# Patient Record
Sex: FEMALE | Race: WHITE | NOT HISPANIC OR LATINO | Employment: UNEMPLOYED | URBAN - METROPOLITAN AREA
[De-identification: names, ages, dates, MRNs, and addresses within clinical notes are randomized per-mention and may not be internally consistent; named-entity substitution may affect disease eponyms.]

---

## 2016-11-29 LAB — CRC RECOMMENDATION EXT: NORMAL

## 2019-07-16 ENCOUNTER — TELEPHONE (OUTPATIENT)
Dept: TRANSPLANT | Facility: CLINIC | Age: 54
End: 2019-07-16

## 2019-07-16 NOTE — TELEPHONE ENCOUNTER
----- Message from Trupti Schroeder sent at 7/16/2019  8:43 AM CDT -----    Rec'tangela recs on pt, but missing the 1st 30 pg. Called Ref md office, Silviano Clark, at [phone] and they will re-send pt ref to [phone]

## 2019-07-29 ENCOUNTER — TELEPHONE (OUTPATIENT)
Dept: TRANSPLANT | Facility: CLINIC | Age: 54
End: 2019-07-29

## 2019-07-29 DIAGNOSIS — K70.31 ALCOHOLIC CIRRHOSIS OF LIVER WITH ASCITES: ICD-10-CM

## 2019-07-29 PROBLEM — K70.30 ALCOHOLIC CIRRHOSIS OF LIVER: Status: ACTIVE | Noted: 2019-07-29

## 2019-07-29 NOTE — TELEPHONE ENCOUNTER
Referral received from Dr Silviano Clark    Patient with cirrhosis  Secondary to alcohol use. . MELD 13-18 depending on labs.   ICD-10:  K70.31  Referred for liver transplant for CONSULT     Referral completed and forwarded to [facility].          Insurance: [facility]  PRIMARY: [facility]   ID: [id number]  Contact # [phone]    SECONDARY:   ID:  Contact #

## 2019-08-06 ENCOUNTER — TELEPHONE (OUTPATIENT)
Dept: TRANSPLANT | Facility: CLINIC | Age: 54
End: 2019-08-06

## 2019-08-06 RX ORDER — SPIRONOLACTONE 100 MG/1
300 TABLET, FILM COATED ORAL
COMMUNITY
Start: 2018-10-26 | End: 2019-10-24 | Stop reason: SDUPTHER

## 2019-08-06 RX ORDER — CIPROFLOXACIN 500 MG/1
TABLET ORAL
Refills: 0 | COMMUNITY
Start: 2019-07-09 | End: 2019-08-21 | Stop reason: ALTCHOICE

## 2019-08-06 RX ORDER — DULOXETIN HYDROCHLORIDE 60 MG/1
60 CAPSULE, DELAYED RELEASE ORAL DAILY
COMMUNITY
Start: 2016-11-02 | End: 2022-07-19 | Stop reason: SDUPTHER

## 2019-08-06 NOTE — TELEPHONE ENCOUNTER
----- Message from Trupti Schroeder sent at 8/6/2019 12:22 PM CDT -----    Called and sp to pt about aurelia an appt. Offered her appts for randall week, but she wanted an PM appt; appt aurelia'ed for 8/21

## 2019-08-07 DIAGNOSIS — K70.30 ALCOHOLIC CIRRHOSIS, UNSPECIFIED WHETHER ASCITES PRESENT: Primary | ICD-10-CM

## 2019-08-07 DIAGNOSIS — Z76.82 ORGAN TRANSPLANT CANDIDATE: ICD-10-CM

## 2019-08-21 ENCOUNTER — LAB VISIT (OUTPATIENT)
Dept: LAB | Facility: HOSPITAL | Age: 54
End: 2019-08-21
Payer: COMMERCIAL

## 2019-08-21 ENCOUNTER — OFFICE VISIT (OUTPATIENT)
Dept: TRANSPLANT | Facility: CLINIC | Age: 54
End: 2019-08-21
Payer: COMMERCIAL

## 2019-08-21 VITALS
TEMPERATURE: 99 F | HEIGHT: 64 IN | OXYGEN SATURATION: 100 % | BODY MASS INDEX: 30.63 KG/M2 | HEART RATE: 98 BPM | DIASTOLIC BLOOD PRESSURE: 76 MMHG | RESPIRATION RATE: 17 BRPM | SYSTOLIC BLOOD PRESSURE: 140 MMHG | WEIGHT: 179.44 LBS

## 2019-08-21 DIAGNOSIS — K70.30 ALCOHOLIC CIRRHOSIS, UNSPECIFIED WHETHER ASCITES PRESENT: ICD-10-CM

## 2019-08-21 DIAGNOSIS — F41.9 ANXIETY DISORDER, UNSPECIFIED TYPE: ICD-10-CM

## 2019-08-21 DIAGNOSIS — Z76.82 ORGAN TRANSPLANT CANDIDATE: ICD-10-CM

## 2019-08-21 DIAGNOSIS — F32.A DEPRESSION, UNSPECIFIED DEPRESSION TYPE: ICD-10-CM

## 2019-08-21 DIAGNOSIS — Z87.2 HISTORY OF CELLULITIS: ICD-10-CM

## 2019-08-21 DIAGNOSIS — K76.6 PORTAL HYPERTENSIVE GASTROPATHY: ICD-10-CM

## 2019-08-21 DIAGNOSIS — K31.89 PORTAL HYPERTENSIVE GASTROPATHY: ICD-10-CM

## 2019-08-21 DIAGNOSIS — R18.8 OTHER ASCITES: ICD-10-CM

## 2019-08-21 DIAGNOSIS — B18.2 CHRONIC HEPATITIS C WITHOUT HEPATIC COMA: ICD-10-CM

## 2019-08-21 DIAGNOSIS — E87.1 HYPONATREMIA: ICD-10-CM

## 2019-08-21 PROBLEM — E11.9 TYPE 2 DIABETES MELLITUS: Status: ACTIVE | Noted: 2019-08-21

## 2019-08-21 LAB
ABO + RH BLD: NORMAL
AFP SERPL-MCNC: 6.1 NG/ML (ref 0–8.4)
ALBUMIN SERPL BCP-MCNC: 3.1 G/DL (ref 3.5–5.2)
ALP SERPL-CCNC: 197 U/L (ref 55–135)
ALT SERPL W/O P-5'-P-CCNC: 18 U/L (ref 10–44)
ANION GAP SERPL CALC-SCNC: 9 MMOL/L (ref 8–16)
AST SERPL-CCNC: 33 U/L (ref 10–40)
BASOPHILS # BLD AUTO: 0.05 K/UL (ref 0–0.2)
BASOPHILS NFR BLD: 0.8 % (ref 0–1.9)
BILIRUB DIRECT SERPL-MCNC: 1.1 MG/DL (ref 0.1–0.3)
BILIRUB SERPL-MCNC: 1.7 MG/DL (ref 0.1–1)
BLD GP AB SCN CELLS X3 SERPL QL: NORMAL
BUN SERPL-MCNC: 15 MG/DL (ref 6–20)
CALCIUM SERPL-MCNC: 9.9 MG/DL (ref 8.7–10.5)
CHLORIDE SERPL-SCNC: 97 MMOL/L (ref 95–110)
CO2 SERPL-SCNC: 23 MMOL/L (ref 23–29)
CREAT SERPL-MCNC: 0.8 MG/DL (ref 0.5–1.4)
DIFFERENTIAL METHOD: ABNORMAL
EOSINOPHIL # BLD AUTO: 0.3 K/UL (ref 0–0.5)
EOSINOPHIL NFR BLD: 5.1 % (ref 0–8)
ERYTHROCYTE [DISTWIDTH] IN BLOOD BY AUTOMATED COUNT: 15 % (ref 11.5–14.5)
EST. GFR  (AFRICAN AMERICAN): >60 ML/MIN/1.73 M^2
EST. GFR  (NON AFRICAN AMERICAN): >60 ML/MIN/1.73 M^2
GGT SERPL-CCNC: 125 U/L (ref 8–55)
GLUCOSE SERPL-MCNC: 91 MG/DL (ref 70–110)
HCT VFR BLD AUTO: 36.5 % (ref 37–48.5)
HGB BLD-MCNC: 11.8 G/DL (ref 12–16)
IMM GRANULOCYTES # BLD AUTO: 0.04 K/UL (ref 0–0.04)
IMM GRANULOCYTES NFR BLD AUTO: 0.6 % (ref 0–0.5)
INR PPP: 1.2 (ref 0.8–1.2)
LYMPHOCYTES # BLD AUTO: 1.7 K/UL (ref 1–4.8)
LYMPHOCYTES NFR BLD: 25.4 % (ref 18–48)
MCH RBC QN AUTO: 33.3 PG (ref 27–31)
MCHC RBC AUTO-ENTMCNC: 32.3 G/DL (ref 32–36)
MCV RBC AUTO: 103 FL (ref 82–98)
MONOCYTES # BLD AUTO: 0.7 K/UL (ref 0.3–1)
MONOCYTES NFR BLD: 10.6 % (ref 4–15)
NEUTROPHILS # BLD AUTO: 3.8 K/UL (ref 1.8–7.7)
NEUTROPHILS NFR BLD: 57.5 % (ref 38–73)
NRBC BLD-RTO: 0 /100 WBC
PLATELET # BLD AUTO: 163 K/UL (ref 150–350)
PMV BLD AUTO: 9.3 FL (ref 9.2–12.9)
POTASSIUM SERPL-SCNC: 4.8 MMOL/L (ref 3.5–5.1)
PROT SERPL-MCNC: 7.6 G/DL (ref 6–8.4)
PROTHROMBIN TIME: 12.4 SEC (ref 9–12.5)
RBC # BLD AUTO: 3.54 M/UL (ref 4–5.4)
SODIUM SERPL-SCNC: 129 MMOL/L (ref 136–145)
WBC # BLD AUTO: 6.53 K/UL (ref 3.9–12.7)

## 2019-08-21 PROCEDURE — 80321 ALCOHOLS BIOMARKERS 1OR 2: CPT | Mod: TXP

## 2019-08-21 PROCEDURE — 99999 PR PBB SHADOW E&M-EST. PATIENT-LVL III: CPT | Mod: PBBFAC,TXP,, | Performed by: INTERNAL MEDICINE

## 2019-08-21 PROCEDURE — 99205 PR OFFICE/OUTPT VISIT, NEW, LEVL V, 60-74 MIN: ICD-10-PCS | Mod: S$GLB,TXP,, | Performed by: INTERNAL MEDICINE

## 2019-08-21 PROCEDURE — 85025 COMPLETE CBC W/AUTO DIFF WBC: CPT | Mod: NTX

## 2019-08-21 PROCEDURE — 80053 COMPREHEN METABOLIC PANEL: CPT | Mod: TXP

## 2019-08-21 PROCEDURE — 82248 BILIRUBIN DIRECT: CPT | Mod: TXP

## 2019-08-21 PROCEDURE — 36415 COLL VENOUS BLD VENIPUNCTURE: CPT | Mod: PN,NTX

## 2019-08-21 PROCEDURE — 99999 PR PBB SHADOW E&M-EST. PATIENT-LVL III: ICD-10-PCS | Mod: PBBFAC,TXP,, | Performed by: INTERNAL MEDICINE

## 2019-08-21 PROCEDURE — 82105 ALPHA-FETOPROTEIN SERUM: CPT | Mod: TXP

## 2019-08-21 PROCEDURE — 99205 OFFICE O/P NEW HI 60 MIN: CPT | Mod: S$GLB,TXP,, | Performed by: INTERNAL MEDICINE

## 2019-08-21 PROCEDURE — 86901 BLOOD TYPING SEROLOGIC RH(D): CPT | Mod: TXP

## 2019-08-21 PROCEDURE — 3008F PR BODY MASS INDEX (BMI) DOCUMENTED: ICD-10-PCS | Mod: CPTII,S$GLB,TXP, | Performed by: INTERNAL MEDICINE

## 2019-08-21 PROCEDURE — 85610 PROTHROMBIN TIME: CPT | Mod: NTX

## 2019-08-21 PROCEDURE — 82977 ASSAY OF GGT: CPT | Mod: NTX

## 2019-08-21 PROCEDURE — 3008F BODY MASS INDEX DOCD: CPT | Mod: CPTII,S$GLB,TXP, | Performed by: INTERNAL MEDICINE

## 2019-08-21 RX ORDER — LANOLIN ALCOHOL/MO/W.PET/CERES
100 CREAM (GRAM) TOPICAL DAILY
COMMUNITY
End: 2019-12-12 | Stop reason: SDUPTHER

## 2019-08-21 NOTE — LETTER
August 21, 2019        Silviano Clark  [address]  [address]  [facility]  [place] [address]  Phone: [phone]  Fax: [phone]             [place] [facility] Trans[facility]  [address] [address]  Phone: [phone]  Fax: [phone]   Patient: Brunilda Delgadillo   MR Number: [id number]   YOB: 1965   Date of Visit: 8/21/2019       Dear Dr. Silviano Clark    Thank you for referring Brunilda Delgadillo to me for evaluation. Attached you will find relevant portions of my assessment and plan of care.    If you have questions, please do not hesitate to call me. I look forward to following Brunilda Delgadillo along with you.    Sincerely,    Soraida Serna MD    Enclosure    If you would like to receive this communication electronically, please contact [email or web address] or [phone] to request [employer] access.    [employer] is a tool which provides read-only access to select patient information with whom you have a relationship. Its easy to use and provides real time access to review your patients record including encounter summaries, notes, results, and demographic information.    If you feel you have received this communication in error or would no longer like to receive these types of communications, please e-mail [email or web address]

## 2019-08-21 NOTE — PATIENT INSTRUCTIONS
1. Endocrinology appt  2. Liver transplant evaluation  3. Consider TIPS vs transplant  4. Urgent paracentesis  5. Addiction psychiatry as part of evaluation  Return 4 weeks

## 2019-08-21 NOTE — Clinical Note
1. Endocrinology appt2. Liver transplant evaluation3. Consider TIPS vs transplant4. Urgent paracentesis5. Addiction psychiatry as part of evaluationReturn 4 weeks

## 2019-08-21 NOTE — Clinical Note
Tell pt to add back lasix 40 mg daily; continue spironolactone 100 mg daily; weekly labs x 4; still needs paracentesis

## 2019-08-21 NOTE — PROGRESS NOTES
Transplant Hepatology  Liver Transplant Recipient Evaluation      Original Referring Provider: Silviano Clark MD  Current Corresponding Physician: Silviano Clark MD    [facility] Native Liver Diagnosis: Alcoholic Cirrhosis    Reason for Visit: evaluation for liver transplant     Subjective:     Brunilda Delgadillo is a 54 y.o. 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 She is a homemaker and is accompanied by her  today who is a . She has a 14 and 17 year old who live at home.    --pt has drank alcohol most of her adult life; last drink ~1 month ago  --developed ascites June 2019 and has had 3 paracenteses (last one month ago)  --lasix on hold due to hyponatremia- as low as 119.  --pt is eating low salt diet  --no HE  --PHG but no varices on EGD 11/16    Labs 6/18/19: plts 313, INR 1.4, , Na 125, creat 0.8, Na 119   MELD-Na score: 10 at 8/21/2019  1:10 PM  MELD score: 10 at 8/21/2019  1:10 PM  Calculated from:  Serum Creatinine: 0.8 mg/dL (Rounded to 1 mg/dL) at 8/21/2019  1:10 PM  Serum Sodium: 129 mmol/L at 8/21/2019  1:10 PM  Total Bilirubin: 1.7 mg/dL at 8/21/2019  1:10 PM  INR(ratio): 1.2 at 8/21/2019  1:10 PM  Age: 54 years    Review of Systems   Constitutional: Negative.    HENT: Negative.    Eyes: Negative.    Respiratory: Negative.    Cardiovascular: Positive for leg swelling.   Gastrointestinal: Positive for abdominal distention.   Genitourinary: Negative.    Musculoskeletal: Negative.    Skin: Negative.    Neurological: Negative.    Psychiatric/Behavioral: Negative.        Objective:   Physical Exam   Constitutional: She is oriented to person, place, and time. She appears well-developed and well-nourished.   HENT:   Head: Normocephalic.   Eyes: Pupils are equal, round, and reactive to light. No scleral icterus.   Neck: Neck supple. No thyromegaly present.   Cardiovascular: Normal rate, regular rhythm and normal heart sounds.    Pulmonary/Chest: Effort normal and breath sounds normal. She has no wheezes.   Abdominal: Soft. She exhibits distension. She exhibits no mass. There is no tenderness.   Musculoskeletal: Normal range of motion. She exhibits edema.   Neurological: She is oriented to person, place, and time.   Skin: Skin is warm and dry. No rash noted.   Psychiatric: She has a normal mood and affect.   Vitals reviewed.      Transplant Hepatology - Candidacy   Assessment/Plan:     Transplant Candidacy: Brunilda Delgadillo is a 54 y.o. female with ESLD secondary to alcohol abuse here for evaluation for possible OLT. She has large ascites and her ability to tolerate diuretics has been limited by severe hyponatremia. Since hyponatremia suggests a poor prognosis, I am recommending a liver transplant evaluation. Will consider TIPS placement for control of fluid. Need to rule out HCC as cause of decompensation. Other recommendations:  1. Ascites, ongoing, not well controlled: recommend urgent paracentesis; will consider adjustment in diuretics once labs from today are resulted  2. PHG, recommend repeat EGD  3. Cirrhosis, decompensated: liver tx evaluation  4. Alcohol abuse, with <6 months sobriety: addiciton psych to assess; pt not interested in rehab  5. DM, poorly controlled with hemogolbin A1c of 10.8: referral endocrinology    Return 4 weeks    HCV Viremic recipient: I discussed the use of HCV-positive organs in recipients who already have HCV, including the outcomes that are not demonstrably different from HCV-positive recipients receiving HCV-negative organs. The potential advantage to the recipient is possibly receiving a transplant sooner by accepting such an organ.  The patient is willing to consider such grafts.   Non-viremic recipient: I discussed the use of HCV-positive organs in naive recipients, including the risk of viral transmission to the patients or others, potential insurance barriers for antiviral medication coverage, risk for  fibrosing cholestatic hepatitis, death or graft loss. The potential advantage to the recipient is the possibility of receiving a transplant sooner with decreased mortality risk by accepting such an organ. The patient is willing to consider such grafts.    Soraida Serna MD         [facility] Patient Status  Functional Status: 70% - Cares for self: unable to carry on normal activity or active work  Physical Capacity: No Limitations

## 2019-08-26 ENCOUNTER — TELEPHONE (OUTPATIENT)
Dept: TRANSPLANT | Facility: CLINIC | Age: 54
End: 2019-08-26

## 2019-08-26 DIAGNOSIS — K70.31 ALCOHOLIC CIRRHOSIS OF LIVER WITH ASCITES: Primary | ICD-10-CM

## 2019-08-26 NOTE — TELEPHONE ENCOUNTER
----- Message from Trupti Schroeder sent at 8/26/2019  4:21 PM CDT -----  Contact: pt     Returned call to pt, but there was no answer and was unable to LVM.    ----- Message -----  From: Harika Boone  Sent: 8/26/2019   3:57 PM  To: [facility] Pre-Liver Transplant Non-Clinical    Type:  Patient Returning Call    Who Called:  Rickey   Who Left Message for Patient:  Rickey   Does the patient know what this is regarding?:  About a procedure that was scheduled   Best Call Back Number:  [phone]  Additional Information:  None    MALINI

## 2019-08-26 NOTE — TELEPHONE ENCOUNTER
----- Message from Tiarra Draper RN sent at 8/26/2019 11:51 AM CDT -----  Contact: Brunilda      ----- Message -----  From: Esperanza Guzman MA  Sent: 8/26/2019  11:51 AM  To: [facility] Post-Liver Transplant Clinical        ----- Message -----  From: Jennifer Bass  Sent: 8/26/2019  11:41 AM  To: Daphne Quigley Staff    Pt called to f/u on scheduling . [phone]

## 2019-08-26 NOTE — TELEPHONE ENCOUNTER
Returned call to patient. No answer. Scheduled a paracentesis for patient on 8/29 @ 0800. Left detailed message & requested a call back to verify pt's availability.

## 2019-08-26 NOTE — TELEPHONE ENCOUNTER
----- Message from Trupti Schroeder sent at 8/26/2019  5:11 PM CDT -----    Called pt on number listed in chart and confirmed PARA aurelia'ed for 8/29 at 8am. Went over location for the appt

## 2019-08-26 NOTE — TELEPHONE ENCOUNTER
----- Message from Sherie Mcdermott MA sent at 8/26/2019  4:03 PM CDT -----  Contact: pt       ----- Message -----  From: Harika Boone  Sent: 8/26/2019   3:57 PM  To: [facility] Pre-Liver Transplant Non-Clinical    Type:  Patient Returning Call    Who Called:  Rickey   Who Left Message for Patient:  Rickey   Does the patient know what this is regarding?:  About a procedure that was scheduled   Best Call Back Number:  [phone]  Additional Information:  None    MALINI

## 2019-08-27 ENCOUNTER — TELEPHONE (OUTPATIENT)
Dept: TRANSPLANT | Facility: CLINIC | Age: 54
End: 2019-08-27

## 2019-08-27 NOTE — TELEPHONE ENCOUNTER
Requesting clearance for liver transplant evaluation:    Per Dr. Serna on 8/21/19:     Transplant Candidacy: Brunilda Delgadillo is a 54 y.o. female with ESLD secondary to alcohol abuse here for evaluation for possible OLT. She has large ascites and her ability to tolerate diuretics has been limited by severe hyponatremia. Since hyponatremia suggests a poor prognosis, I am recommending a liver transplant evaluation. Will consider TIPS placement for control of fluid. Need to rule out HCC as cause of decompensation.    Alcoholic cirrhosis of liver with ascites  - Primary ICD-10-CM: K70.31  ICD-9-CM: 571.2     MELD-Na score: 10 at 8/21/2019  1:10 PM  MELD score: 10 at 8/21/2019  1:10 PM  Calculated from:  Serum Creatinine: 0.8 mg/dL (Rounded to 1 mg/dL) at 8/21/2019  1:10 PM  Serum Sodium: 129 mmol/L at 8/21/2019  1:10 PM  Total Bilirubin: 1.7 mg/dL at 8/21/2019  1:10 PM  INR(ratio): 1.2 at 8/21/2019  1:10 PM  Age: 54 years

## 2019-08-29 ENCOUNTER — HOSPITAL ENCOUNTER (OUTPATIENT)
Dept: INTERVENTIONAL RADIOLOGY/VASCULAR | Facility: HOSPITAL | Age: 54
Discharge: HOME | End: 2019-08-29
Attending: INTERNAL MEDICINE
Payer: COMMERCIAL

## 2019-08-29 VITALS
OXYGEN SATURATION: 99 % | DIASTOLIC BLOOD PRESSURE: 93 MMHG | RESPIRATION RATE: 18 BRPM | HEART RATE: 86 BPM | SYSTOLIC BLOOD PRESSURE: 146 MMHG

## 2019-08-29 DIAGNOSIS — K70.31 ALCOHOLIC CIRRHOSIS OF LIVER WITH ASCITES: ICD-10-CM

## 2019-08-29 PROCEDURE — P9047 ALBUMIN (HUMAN), 25%, 50ML: HCPCS | Mod: JG,NTX | Performed by: INTERNAL MEDICINE

## 2019-08-29 PROCEDURE — 49083 ABD PARACENTESIS W/IMAGING: CPT | Mod: TXP,,, | Performed by: RADIOLOGY

## 2019-08-29 PROCEDURE — 49083 ABD PARACENTESIS W/IMAGING: CPT | Mod: TXP

## 2019-08-29 PROCEDURE — 63600175 PHARM REV CODE 636 W HCPCS: Mod: JG,NTX | Performed by: INTERNAL MEDICINE

## 2019-08-29 PROCEDURE — 49083 IR PARACENTESIS WITH IMAGING: ICD-10-PCS | Mod: TXP,,, | Performed by: RADIOLOGY

## 2019-08-29 RX ORDER — ALBUMIN HUMAN 250 G/1000ML
50 SOLUTION INTRAVENOUS ONCE
Status: COMPLETED | OUTPATIENT
Start: 2019-08-29 | End: 2019-08-29

## 2019-08-29 RX ADMIN — ALBUMIN HUMAN 50 G: 0.25 SOLUTION INTRAVENOUS at 10:08

## 2019-08-29 NOTE — H&P
Radiology History & Physical      SUBJECTIVE:     Chief Complaint: ascites    History of Present Illness:  Brunilda Delgadillo is a 54 y.o. female who presents for paracentesis  Past Medical History:   Diagnosis Date    Anxiety disorder 2019    Ascites 2019    Cirrhosis     Depression 2019    Diabetes mellitus     History of cellulitis 2019    Hyponatremia 2019    Portal hypertensive gastropathy 2019    On EGD     Type 2 diabetes mellitus 2019     Past Surgical History:   Procedure Laterality Date     SECTION  ,     COLONOSCOPY      EYE SURGERY      cosmetic    HERNIA REPAIR  2006    umbilical    TONSILLECTOMY  1972    TUBAL LIGATION      tummy tuck         Home Meds:   Prior to Admission medications    Medication Sig Start Date End Date Taking? Authorizing Provider   DULoxetine (CYMBALTA) 60 MG capsule Take 60 mg by mouth once daily.  16   Historical Provider, MD   insulin detemir (LEVEMIR U-100 INSULIN SUBQ) Inject 40 Units into the skin once daily.    Historical Provider, MD   multivit with minerals/lutein (MULTIVITAMIN 50 PLUS ORAL) Take by mouth. 10/26/18   Historical Provider, MD   spironolactone (ALDACTONE) 100 MG tablet Take 100 mg by mouth. 10/26/18   Historical Provider, MD   thiamine 100 MG tablet Take 100 mg by mouth once daily.    Historical Provider, MD     Anticoagulants/Antiplatelets: no anticoagulation    Allergies:   Review of patient's allergies indicates:   Allergen Reactions    Penicillins      Sedation History:  no adverse reactions    Review of Systems:   Hematological: no known coagulopathies  Respiratory: no cough, shortness of breath, or wheezing  no shortness of breath  Cardiovascular: no chest pain or dyspnea on exertion  no chest pain  Gastrointestinal: no abdominal pain, change in bowel habits, or black or bloody stools  no abdominal pain  Genito-Urinary: no dysuria, trouble voiding, or hematuria  no  dysuria  Musculoskeletal: negative  Neurological: no TIA or stroke symptoms         OBJECTIVE:     Vital Signs (Most Recent)       Physical Exam:  ASA: III  Mallampati: III    General: no acute distress  Mental Status: alert and oriented to person, place and time  HEENT: normocephalic, atraumatic  Chest: unlabored breathing  Heart: regular heart rate  Abdomen: distended  Extremity: moves all extremities    Laboratory  Lab Results   Component Value Date    INR 1.2 08/21/2019       Lab Results   Component Value Date    WBC 6.53 08/21/2019    HGB 11.8 (L) 08/21/2019    HCT 36.5 (L) 08/21/2019     (H) 08/21/2019     08/21/2019      Lab Results   Component Value Date    GLU 91 08/21/2019     (L) 08/21/2019    K 4.8 08/21/2019    CL 97 08/21/2019    CO2 23 08/21/2019    BUN 15 08/21/2019    CREATININE 0.8 08/21/2019    CALCIUM 9.9 08/21/2019    ALT 18 08/21/2019    AST 33 08/21/2019    ALBUMIN 3.1 (L) 08/21/2019    BILITOT 1.7 (H) 08/21/2019    BILIDIR 1.1 (H) 08/21/2019       ASSESSMENT/PLAN:     Sedation Plan: local anesthetic only  Patient will undergo paracentesis.    Juliane Vo NP  Interventional Radiology

## 2019-08-29 NOTE — DISCHARGE INSTRUCTIONS
BOB MONDAY-FRIDAY 8AM-5PM CALL [phone]  AFTER HOURS CALL [phone] ASK FOR RADIOLOGY RESIDENT

## 2019-08-29 NOTE — PLAN OF CARE
Paracentesis completed.  NAD noted.  5 L removed from Left abd.  200 ml of albumin given.  Dressing applied, CDI. PT escorted to lobby. Steady gait noted.

## 2019-08-29 NOTE — PROCEDURES
"Paracentesis  Date/Time: 2019 9:15 AM  Location procedure was performed: [facility] INTERVENTIONAL RADIOLOGY  Performed by: Juliane Vo NP  Authorized by: Juilane Vo NP   Pre-operative diagnosis: ascites  Post-operative diagnosis: ascites  Consent Done: Yes  Consent: Written consent obtained.  Consent given by: patient  Patient understanding: patient states understanding of the procedure being performed  Patient consent: the patient's understanding of the procedure matches consent given  Procedure consent: procedure consent matches procedure scheduled  Relevant documents: relevant documents present and verified  Test results: test results available and properly labeled  Site marked: the operative site was marked  Imaging studies: imaging studies available  Required items: required blood products, implants, devices, and special equipment available  Patient identity confirmed: , MRN, name and verbally with patient  Time out: Immediately prior to procedure a "time out" was called to verify the correct patient, procedure, equipment, support staff and site/side marked as required.  Initial or subsequent exam: initial  Procedure purpose: therapeutic  Indications: abdominal discomfort secondary to ascites  Anesthesia: local infiltration    Anesthesia:  Local Anesthetic: lidocaine 1% without epinephrine  Anesthetic total: 10 mL  Patient sedated: no  Preparation: Patient was prepped and draped in the usual sterile fashion.  Needle gauge: 5 Danish.  Ultrasound guidance: no  Puncture site: left lower quadrant  Fluid removed: 5000(ml)  Fluid appearance: serous  Dressing: 4x4 sterile gauze  Technical procedures used: [facility] catheter  Specimens: No  Implants: No  Patient tolerance: Patient tolerated the procedure well with no immediate complications          Juliane Vo  2019    "

## 2019-09-04 LAB — PHOSPHATIDYLETHANOL (PETH): 413 NG/ML

## 2019-09-06 ENCOUNTER — TELEPHONE (OUTPATIENT)
Dept: TRANSPLANT | Facility: CLINIC | Age: 54
End: 2019-09-06

## 2019-09-06 DIAGNOSIS — K70.31 ALCOHOLIC CIRRHOSIS OF LIVER WITH ASCITES: Primary | ICD-10-CM

## 2019-09-06 NOTE — TELEPHONE ENCOUNTER
Called pt to discuss PEth results. PEth returned a result of 413, but patient stated that she has not had any alcohol recently. Informed her that Dr. Serna is requesting a repeat PEth now. Pt is also requesting a paracentesis. Will place order for para & book via IR.

## 2019-09-06 NOTE — TELEPHONE ENCOUNTER
----- Message from Soraida Serna MD sent at 9/4/2019  3:59 PM CDT -----  Pt had stopped alcohol 1 month prior to this visit- please call and discuss; repeat now - please let patient know.

## 2019-09-06 NOTE — TELEPHONE ENCOUNTER
Called pt to inform her she will have labs @ 0839 & paracentesis @ 1000 at [facility] on 9/12. Understanding expressed. No other questions at this time.

## 2019-09-12 ENCOUNTER — HOSPITAL ENCOUNTER (OUTPATIENT)
Dept: INTERVENTIONAL RADIOLOGY/VASCULAR | Facility: HOSPITAL | Age: 54
Discharge: HOME | End: 2019-09-12
Attending: INTERNAL MEDICINE
Payer: COMMERCIAL

## 2019-09-12 VITALS
DIASTOLIC BLOOD PRESSURE: 89 MMHG | HEART RATE: 85 BPM | OXYGEN SATURATION: 100 % | SYSTOLIC BLOOD PRESSURE: 138 MMHG | RESPIRATION RATE: 16 BRPM

## 2019-09-12 DIAGNOSIS — K70.31 ALCOHOLIC CIRRHOSIS OF LIVER WITH ASCITES: ICD-10-CM

## 2019-09-12 PROCEDURE — 49083 IR PARACENTESIS WITH IMAGING: ICD-10-PCS | Mod: TXP,,, | Performed by: RADIOLOGY

## 2019-09-12 PROCEDURE — 49083 ABD PARACENTESIS W/IMAGING: CPT | Mod: TXP

## 2019-09-12 PROCEDURE — 63600175 PHARM REV CODE 636 W HCPCS: Mod: JG,TXP | Performed by: INTERNAL MEDICINE

## 2019-09-12 PROCEDURE — P9047 ALBUMIN (HUMAN), 25%, 50ML: HCPCS | Mod: JG,TXP | Performed by: INTERNAL MEDICINE

## 2019-09-12 PROCEDURE — 49083 ABD PARACENTESIS W/IMAGING: CPT | Mod: TXP,,, | Performed by: RADIOLOGY

## 2019-09-12 RX ORDER — ALBUMIN HUMAN 250 G/1000ML
87.5 SOLUTION INTRAVENOUS ONCE
Status: COMPLETED | OUTPATIENT
Start: 2019-09-12 | End: 2019-09-12

## 2019-09-12 RX ADMIN — ALBUMIN (HUMAN) 87.5 G: 25 SOLUTION INTRAVENOUS at 11:09

## 2019-09-12 NOTE — PLAN OF CARE
Paracentesis complete 10L removed from left abdomen.  Pt tolerated well Mepore/bandaid applied. Pt in no acute distress, denies pain and discomfort. Discharge care and instructions given and acknolwedged. Pt ambulatory to discharge, steady gait observed

## 2019-09-12 NOTE — DISCHARGE INSTRUCTIONS
Please call with any questions or concerns.      Monday thru Friday 8:00 am - 4:30 pm    Interventional Radiology   [phone]    After Hours    Ask for the Radiology Intern on call  [phone]

## 2019-09-12 NOTE — PROCEDURES
Radiology Post-Procedure Note    Pre Op Diagnosis: Ascites  Post Op Diagnosis: Same    Procedure: Paracentesis    Procedure performed by: Brett Briggs MD    Written Informed Consent Obtained: Yes  Specimen Removed: YES clear yellow fluid  Estimated Blood Loss: Minimal    Findings:   Successful paracentesis.  Albumin administered PRN per protocol.    Patient tolerated procedure well.    Brett Briggs MD  Radiology PGY-4  [phone]

## 2019-09-12 NOTE — H&P
Radiology History & Physical      SUBJECTIVE:     Chief Complaint: ascites    History of Present Illness:  Brunilda Delgadillo is a 54 y.o. female who presents for paracentesis.    Past Medical History:   Diagnosis Date    Anxiety disorder 2019    Ascites 2019    Cirrhosis     Depression 2019    Diabetes mellitus     History of cellulitis 2019    Hyponatremia 2019    Portal hypertensive gastropathy 2019    On EGD     Type 2 diabetes mellitus 2019     Past Surgical History:   Procedure Laterality Date     SECTION  ,     COLONOSCOPY      EYE SURGERY      cosmetic    HERNIA REPAIR  2006    umbilical    TONSILLECTOMY  1972    TUBAL LIGATION      tummy tuck         Home Meds:   Prior to Admission medications    Medication Sig Start Date End Date Taking? Authorizing Provider   DULoxetine (CYMBALTA) 60 MG capsule Take 60 mg by mouth once daily.  16   Historical Provider, MD   insulin detemir (LEVEMIR U-100 INSULIN SUBQ) Inject 40 Units into the skin once daily.    Historical Provider, MD   multivit with minerals/lutein (MULTIVITAMIN 50 PLUS ORAL) Take by mouth. 10/26/18   Historical Provider, MD   spironolactone (ALDACTONE) 100 MG tablet Take 100 mg by mouth. 10/26/18   Historical Provider, MD   thiamine 100 MG tablet Take 100 mg by mouth once daily.    Historical Provider, MD     Anticoagulants/Antiplatelets: no anticoagulation    Allergies:   Review of patient's allergies indicates:   Allergen Reactions    Penicillins      Sedation History:  no adverse reactions    Review of Systems:   Hematological: no known coagulopathies  Respiratory: no shortness of breath  Cardiovascular: no chest pain  Gastrointestinal: no abdominal pain  Genito-Urinary: no dysuria  Musculoskeletal: negative  Neurological: no TIA or stroke symptoms         OBJECTIVE:     Vital Signs (Most Recent)       Physical Exam:  ASA: 2  Mallampati: 2    General: no acute  distress  Mental Status: alert and oriented to person, place and time  HEENT: normocephalic, atraumatic  Chest: unlabored breathing  Heart: regular heart rate  Abdomen: distended  Extremity: moves all extremities    Laboratory  Lab Results   Component Value Date    INR 1.2 08/21/2019       Lab Results   Component Value Date    WBC 6.53 08/21/2019    HGB 11.8 (L) 08/21/2019    HCT 36.5 (L) 08/21/2019     (H) 08/21/2019     08/21/2019      Lab Results   Component Value Date    GLU 91 08/21/2019     (L) 08/21/2019    K 4.8 08/21/2019    CL 97 08/21/2019    CO2 23 08/21/2019    BUN 15 08/21/2019    CREATININE 0.8 08/21/2019    CALCIUM 9.9 08/21/2019    ALT 18 08/21/2019    AST 33 08/21/2019    ALBUMIN 3.1 (L) 08/21/2019    BILITOT 1.7 (H) 08/21/2019    BILIDIR 1.1 (H) 08/21/2019       ASSESSMENT/PLAN:     Sedation Plan: local  Patient will undergo paracentesis.    Brett Briggs MD  Radiology PGY-4  [phone]

## 2019-10-09 ENCOUNTER — TELEPHONE (OUTPATIENT)
Dept: TRANSPLANT | Facility: CLINIC | Age: 54
End: 2019-10-09

## 2019-10-09 NOTE — TELEPHONE ENCOUNTER
----- Message from Rubia Tai sent at 10/9/2019  3:42 PM CDT -----  Pt states she needs to sched paracentesis and also has questions for her coordinator    Pt contact [phone]

## 2019-10-09 NOTE — TELEPHONE ENCOUNTER
Patient called to request para, appt with Dr. Serna & bill consult for DM2. Orders placed &  notified. Also, notified pt that her PEth test came back positive, but appears to be going down. Encouraged pt to continue to abstain from alcohol. Understanding expressed. No other questions at this time.

## 2019-10-14 DIAGNOSIS — K70.31 ALCOHOLIC CIRRHOSIS OF LIVER WITH ASCITES: Primary | ICD-10-CM

## 2019-10-14 DIAGNOSIS — Z76.82 ORGAN TRANSPLANT CANDIDATE: ICD-10-CM

## 2019-10-15 ENCOUNTER — TELEPHONE (OUTPATIENT)
Dept: TRANSPLANT | Facility: CLINIC | Age: 54
End: 2019-10-15

## 2019-10-15 DIAGNOSIS — K70.31 ALCOHOLIC CIRRHOSIS OF LIVER WITH ASCITES: Primary | ICD-10-CM

## 2019-10-15 DIAGNOSIS — Z76.82 ORGAN TRANSPLANT CANDIDATE: ICD-10-CM

## 2019-10-15 NOTE — TELEPHONE ENCOUNTER
Called pt to discuss liver transplant evaluation options. Patient will be coming to clinic 12/12 & 12/13 for Fast Pass. Explained process to patient, including the requirement to bring a caregiver to the appointments. Patient has identified her  as her caregiver. She will also utilize her parents as back-up caregivers. Orders for evaluation placed. Chart passed to .

## 2019-10-15 NOTE — TELEPHONE ENCOUNTER
----- Message from Trupti Schroeder sent at 10/15/2019 10:50 AM CDT -----  Regarding: FW: RTC/labs    Rec;tangela call from pt and re/aurelia her appt vidhya/Daphne to 10/25    ----- Message -----  From: Trupti Schroeder  Sent: 10/15/2019  10:34 AM CDT  To: Trupti Schroeder  Subject: RE: RTC/labs                                       Called and sp to pt; Appt with  and labs aurelia'ed for 10/17 and Endo appt aurelia'ed for 10/18    ----- Message -----  From: Rickey Ortega RN  Sent: 10/14/2019   9:02 AM CDT  To: Trupti Schroeder  Subject: RTC/labs                                         Needs appt with Daphne ray & MELD/[facility] labs (can be done @ [facility]). Pt also needs a consult with Endo for her Type 2 diabetes. Orders in.

## 2019-10-16 ENCOUNTER — TELEPHONE (OUTPATIENT)
Dept: ADMINISTRATIVE | Facility: OTHER | Age: 54
End: 2019-10-16

## 2019-10-16 NOTE — TELEPHONE ENCOUNTER
Left voice message for patient to return call to schedule appointment from referral to Infectious Disease department.  Gem ECHEVARRIA [phone]

## 2019-10-16 NOTE — PROGRESS NOTES
Subjective:      Patient ID: Brunilda Delgadillo is a 54 y.o. female.    Chief Complaint:  Diabetes (New patient )      History of Present Illness  Brunilda Delgadillo presents today for Evaluation & management of type 2 DM. This is her first visit with me.     She is on the liver transplant list due to alcoholic cirrhosis.     With regards to the diabetes:    Diagnosed: 2017  Known complications:  DKA-  RN+  PN+   Nephropathy-    Current regimen:  levemir 40u HS    Missed doses? None     Rotating injection sites, avoid abdomen due to ascites.     Other medications tried: none     1 times a day testing  Log reviewed: none Oral recall    Fastin-102     Eats 2-3 meals a day.   Snacks-  Fruit      Drinks- water & milk     Exercise - tried to stay active     Hypoglycemic event? None   Knows how to correct with 15 grams of carbs- juice, coke, or a peppermint.      Education - last visit: none    Has a Medic alert tag? None     Diabetes Management Status  Statin: Not taking  ACE/ARB: Not taking  Screening or Prevention Patient's value Goal Complete/Controlled?   HgA1C Testing and Control   No results found for: HGBA1C   Annually/Less than 8% No   Lipid profile Most Recent Lipid Panel Health Maintenance Topic Completion: Not Found Annually No   LDL control No results found for: LDLCALC Annually/Less than 100 mg/dl  No   Nephropathy screening No results found for: LABMICR  No results found for: PROTEINUA Annually No   Blood pressure BP Readings from Last 1 Encounters:   10/18/19 128/80    Less than 140/90 Yes   Dilated retinal exam Most Recent Eye Exam Date: Not Found Annually No   Foot exam   Most Recent Foot Exam Date: Not Found Annually No     Denies history of pancreatitis & personal/family history of medullary thyroid cancer.     Review of Systems   Constitutional: Negative for fatigue.   Eyes: Negative for visual disturbance.   Respiratory: Negative for shortness of breath.    Cardiovascular: Negative for chest pain.  "  Gastrointestinal: Negative for abdominal pain.   Musculoskeletal: Negative for arthralgias.   Skin: Negative for wound.   Neurological: Negative for headaches.   Hematological: Does not bruise/bleed easily.   Psychiatric/Behavioral: Negative for sleep disturbance.       Objective:   Physical Exam   Constitutional: She is oriented to person, place, and time. She appears well-developed. No distress.   HENT:   Right Ear: External ear normal.   Left Ear: External ear normal.   Hearing Normal     Neck: No tracheal deviation present. No thyromegaly present.   No nodules.   Cardiovascular: Normal rate.   No murmur heard.  No edema present   Pulmonary/Chest: Effort normal and breath sounds normal.   Abdominal: Soft. She exhibits no mass. No hernia.   + ascites    Musculoskeletal: Normal range of motion. She exhibits no edema.   Neurological: She is alert and oriented to person, place, and time. No sensory deficit. Coordination normal.   Skin: No rash noted. No pallor.   Psychiatric: She has a normal mood and affect. Judgment normal.   Vitals reviewed.  Appropriate footwear, Foot exam deferred per patient   No ulcers or wounds.   injection sites are ok. No lipo hypertropthy or atrophy    Visit Vitals  /80   Pulse 77   Ht 5' 4" (1.626 m)   Wt 73.3 kg (161 lb 9.6 oz)   BMI 27.74 kg/m²      Lab Review:   No results found for: HGBA1C  No results found for: CHOL, HDL, LDLCALC, TRIG, CHOLHDL  Lab Results   Component Value Date     (L) 09/12/2019    K 4.4 09/12/2019     09/12/2019    CO2 24 09/12/2019    GLU 92 09/12/2019    BUN 13 09/12/2019    CREATININE 0.8 09/12/2019    CALCIUM 9.8 09/12/2019    PROT 7.5 09/12/2019    ALBUMIN 3.4 (L) 09/12/2019    BILITOT 1.4 (H) 09/12/2019    ALKPHOS 196 (H) 09/12/2019    AST 29 09/12/2019    ALT 17 09/12/2019    ANIONGAP 9 09/12/2019    ESTGFRAFRICA >60.0 09/12/2019    EGFRNONAA >60.0 09/12/2019    TSH 2.21 02/10/2009     No results found for: [id number]  Assessment and " Plan     1. Type 2 diabetes mellitus with other specified complication, with long-term current use of insulin  Hemoglobin A1c    Comprehensive metabolic panel    CBC auto differential   2. Alcoholic cirrhosis of liver with ascites  Ambulatory referral/consult to Endocrinology   3. Organ transplant candidate  Ambulatory referral/consult to Endocrinology   4. Other ascites       Type 2 diabetes mellitus  -- Reviewed goals of therapy are to get the best control we can without hypoglycemia  Continue levemir 40u HS at this time, May adjust after lab results today.   -- Reviewed patient's current insulin regimen. Clarified proper insulin dose and timing in relation to meals, etc. Insulin injection sites and proper rotation instructed.    -- Advised frequent self blood glucose monitoring.  Patient encouraged to document glucose results and bring them to every clinic visit    -- Hypoglycemia precautions discussed. Instructed on precautions before driving.    -- Call for Bg repeatedly < 90 or > 180.   -- Close adherence to lifestyle changes recommended.   -- Periodic follow ups for eye evaluations, foot care and dental care suggested.    Alcoholic cirrhosis of liver  Insulin is the safest thing for her to be on at this time.  Discussed to ensure that she is not injecting in her abdomen due to ascites.  -- Continue following with hepatology/liver tx    Ascites  See above    Follow up in about 4 months (around 2/18/2020).  Labs today     She just finished eating so will check LP with next visit

## 2019-10-18 ENCOUNTER — OFFICE VISIT (OUTPATIENT)
Dept: ENDOCRINOLOGY | Facility: CLINIC | Age: 54
End: 2019-10-18
Payer: COMMERCIAL

## 2019-10-18 ENCOUNTER — LAB VISIT (OUTPATIENT)
Dept: LAB | Facility: HOSPITAL | Age: 54
End: 2019-10-18
Payer: COMMERCIAL

## 2019-10-18 VITALS
BODY MASS INDEX: 27.59 KG/M2 | DIASTOLIC BLOOD PRESSURE: 80 MMHG | WEIGHT: 161.63 LBS | HEIGHT: 64 IN | HEART RATE: 77 BPM | SYSTOLIC BLOOD PRESSURE: 128 MMHG

## 2019-10-18 DIAGNOSIS — Z79.4 TYPE 2 DIABETES MELLITUS WITH OTHER SPECIFIED COMPLICATION, WITH LONG-TERM CURRENT USE OF INSULIN: Primary | ICD-10-CM

## 2019-10-18 DIAGNOSIS — Z79.4 TYPE 2 DIABETES MELLITUS WITH OTHER SPECIFIED COMPLICATION, WITH LONG-TERM CURRENT USE OF INSULIN: ICD-10-CM

## 2019-10-18 DIAGNOSIS — E11.69 TYPE 2 DIABETES MELLITUS WITH OTHER SPECIFIED COMPLICATION, WITH LONG-TERM CURRENT USE OF INSULIN: Primary | ICD-10-CM

## 2019-10-18 DIAGNOSIS — E11.69 TYPE 2 DIABETES MELLITUS WITH OTHER SPECIFIED COMPLICATION, WITH LONG-TERM CURRENT USE OF INSULIN: ICD-10-CM

## 2019-10-18 DIAGNOSIS — K70.31 ALCOHOLIC CIRRHOSIS OF LIVER WITH ASCITES: ICD-10-CM

## 2019-10-18 DIAGNOSIS — R18.8 OTHER ASCITES: ICD-10-CM

## 2019-10-18 DIAGNOSIS — Z76.82 ORGAN TRANSPLANT CANDIDATE: ICD-10-CM

## 2019-10-18 LAB
ALBUMIN SERPL BCP-MCNC: 3.9 G/DL (ref 3.5–5.2)
ALP SERPL-CCNC: 178 U/L (ref 55–135)
ALT SERPL W/O P-5'-P-CCNC: 21 U/L (ref 10–44)
ANION GAP SERPL CALC-SCNC: 7 MMOL/L (ref 8–16)
AST SERPL-CCNC: 29 U/L (ref 10–40)
BASOPHILS # BLD AUTO: 0.05 K/UL (ref 0–0.2)
BASOPHILS NFR BLD: 0.6 % (ref 0–1.9)
BILIRUB SERPL-MCNC: 1.6 MG/DL (ref 0.1–1)
BUN SERPL-MCNC: 17 MG/DL (ref 6–20)
CALCIUM SERPL-MCNC: 10.1 MG/DL (ref 8.7–10.5)
CHLORIDE SERPL-SCNC: 98 MMOL/L (ref 95–110)
CO2 SERPL-SCNC: 27 MMOL/L (ref 23–29)
CREAT SERPL-MCNC: 0.9 MG/DL (ref 0.5–1.4)
DIFFERENTIAL METHOD: ABNORMAL
EOSINOPHIL # BLD AUTO: 0.5 K/UL (ref 0–0.5)
EOSINOPHIL NFR BLD: 6.7 % (ref 0–8)
ERYTHROCYTE [DISTWIDTH] IN BLOOD BY AUTOMATED COUNT: 13.2 % (ref 11.5–14.5)
EST. GFR  (AFRICAN AMERICAN): >60 ML/MIN/1.73 M^2
EST. GFR  (NON AFRICAN AMERICAN): >60 ML/MIN/1.73 M^2
ESTIMATED AVG GLUCOSE: 105 MG/DL (ref 68–131)
GLUCOSE SERPL-MCNC: 119 MG/DL (ref 70–110)
HBA1C MFR BLD HPLC: 5.3 % (ref 4–5.6)
HCT VFR BLD AUTO: 37.7 % (ref 37–48.5)
HGB BLD-MCNC: 12.3 G/DL (ref 12–16)
IMM GRANULOCYTES # BLD AUTO: 0.04 K/UL (ref 0–0.04)
IMM GRANULOCYTES NFR BLD AUTO: 0.5 % (ref 0–0.5)
LYMPHOCYTES # BLD AUTO: 1.9 K/UL (ref 1–4.8)
LYMPHOCYTES NFR BLD: 23.9 % (ref 18–48)
MCH RBC QN AUTO: 31.8 PG (ref 27–31)
MCHC RBC AUTO-ENTMCNC: 32.6 G/DL (ref 32–36)
MCV RBC AUTO: 97 FL (ref 82–98)
MONOCYTES # BLD AUTO: 0.7 K/UL (ref 0.3–1)
MONOCYTES NFR BLD: 8.3 % (ref 4–15)
NEUTROPHILS # BLD AUTO: 4.7 K/UL (ref 1.8–7.7)
NEUTROPHILS NFR BLD: 60 % (ref 38–73)
NRBC BLD-RTO: 0 /100 WBC
PLATELET # BLD AUTO: 172 K/UL (ref 150–350)
PMV BLD AUTO: 9.3 FL (ref 9.2–12.9)
POTASSIUM SERPL-SCNC: 4.4 MMOL/L (ref 3.5–5.1)
PROT SERPL-MCNC: 8 G/DL (ref 6–8.4)
RBC # BLD AUTO: 3.87 M/UL (ref 4–5.4)
SODIUM SERPL-SCNC: 132 MMOL/L (ref 136–145)
WBC # BLD AUTO: 7.79 K/UL (ref 3.9–12.7)

## 2019-10-18 PROCEDURE — 99204 OFFICE O/P NEW MOD 45 MIN: CPT | Mod: S$GLB,TXP,, | Performed by: NURSE PRACTITIONER

## 2019-10-18 PROCEDURE — 99204 PR OFFICE/OUTPT VISIT, NEW, LEVL IV, 45-59 MIN: ICD-10-PCS | Mod: S$GLB,TXP,, | Performed by: NURSE PRACTITIONER

## 2019-10-18 PROCEDURE — 85025 COMPLETE CBC W/AUTO DIFF WBC: CPT | Mod: NTX

## 2019-10-18 PROCEDURE — 80053 COMPREHEN METABOLIC PANEL: CPT | Mod: NTX

## 2019-10-18 PROCEDURE — 83036 HEMOGLOBIN GLYCOSYLATED A1C: CPT | Mod: TXP

## 2019-10-18 PROCEDURE — 36415 COLL VENOUS BLD VENIPUNCTURE: CPT | Mod: NTX

## 2019-10-18 PROCEDURE — 99999 PR PBB SHADOW E&M-EST. PATIENT-LVL III: CPT | Mod: PBBFAC,TXP,, | Performed by: NURSE PRACTITIONER

## 2019-10-18 PROCEDURE — 99999 PR PBB SHADOW E&M-EST. PATIENT-LVL III: ICD-10-PCS | Mod: PBBFAC,TXP,, | Performed by: NURSE PRACTITIONER

## 2019-10-18 RX ORDER — LANOLIN ALCOHOL/MO/W.PET/CERES
100 CREAM (GRAM) TOPICAL
COMMUNITY
Start: 2018-10-26

## 2019-10-18 NOTE — ASSESSMENT & PLAN NOTE
Insulin is the safest thing for her to be on at this time.  Discussed to ensure that she is not injecting in her abdomen due to ascites.  -- Continue following with hepatology/liver tx

## 2019-10-18 NOTE — ASSESSMENT & PLAN NOTE
-- Reviewed goals of therapy are to get the best control we can without hypoglycemia  Continue levemir 40u HS at this time, May adjust after lab results today.   -- Reviewed patient's current insulin regimen. Clarified proper insulin dose and timing in relation to meals, etc. Insulin injection sites and proper rotation instructed.    -- Advised frequent self blood glucose monitoring.  Patient encouraged to document glucose results and bring them to every clinic visit    -- Hypoglycemia precautions discussed. Instructed on precautions before driving.    -- Call for Bg repeatedly < 90 or > 180.   -- Close adherence to lifestyle changes recommended.   -- Periodic follow ups for eye evaluations, foot care and dental care suggested.

## 2019-10-18 NOTE — LETTER
October 18, 2019      Soraida Serna MD  [address]  [facility] [address]           [facility] [address] [place]  [address]  [facility] [address]  Phone: [phone]  Fax: [phone]          Patient: Brunilda Delgadillo   MR Number: [id number]   YOB: 1965   Date of Visit: 10/18/2019       Dear Dr. Soraida Serna:    Thank you for referring Brunilda Delgadillo to me for evaluation. Attached you will find relevant portions of my assessment and plan of care.    If you have questions, please do not hesitate to call me. I look forward to following Brunilda Delgadillo along with you.    Sincerely,    Alanna George, HENRY    Enclosure  CC:  No Recipients    If you would like to receive this communication electronically, please contact [email or web address] or [phone] to request more information on [employer] access.    For providers and/or their staff who would like to refer a patient to [facility], please contact us through our one-stop-shop provider referral line, [facility], at [phone].    If you feel you have received this communication in error or would no longer like to receive these types of communications, please e-mail [email or web address]

## 2019-10-21 ENCOUNTER — APPOINTMENT (OUTPATIENT)
Dept: INTERVENTIONAL RADIOLOGY/VASCULAR | Facility: OTHER | Age: 54
End: 2019-10-21
Attending: INTERNAL MEDICINE
Payer: COMMERCIAL

## 2019-10-21 ENCOUNTER — HOSPITAL ENCOUNTER (OUTPATIENT)
Facility: OTHER | Age: 54
Discharge: HOME | End: 2019-10-21
Attending: RADIOLOGY | Admitting: RADIOLOGY
Payer: COMMERCIAL

## 2019-10-21 VITALS
RESPIRATION RATE: 18 BRPM | HEART RATE: 80 BPM | DIASTOLIC BLOOD PRESSURE: 75 MMHG | OXYGEN SATURATION: 98 % | TEMPERATURE: 99 F | SYSTOLIC BLOOD PRESSURE: 127 MMHG

## 2019-10-21 DIAGNOSIS — Z76.82 ORGAN TRANSPLANT CANDIDATE: ICD-10-CM

## 2019-10-21 DIAGNOSIS — K70.31 ALCOHOLIC CIRRHOSIS OF LIVER WITH ASCITES: ICD-10-CM

## 2019-10-21 LAB
APPEARANCE FLD: NORMAL
BODY FLD TYPE: NORMAL
COLOR FLD: YELLOW
LYMPHOCYTES NFR FLD MANUAL: 18 %
MESOTHL CELL NFR FLD MANUAL: 6 %
MONOS+MACROS NFR FLD MANUAL: 72 %
NEUTROPHILS NFR FLD MANUAL: 4 %
POCT GLUCOSE: 96 MG/DL (ref 70–110)
WBC # FLD: 328 /CU MM

## 2019-10-21 PROCEDURE — 49083 ABD PARACENTESIS W/IMAGING: CPT | Mod: TXP,,, | Performed by: RADIOLOGY

## 2019-10-21 PROCEDURE — 49083 IR PARACENTESIS WITH IMAGING: ICD-10-PCS | Mod: TXP,,, | Performed by: RADIOLOGY

## 2019-10-21 PROCEDURE — 87205 SMEAR GRAM STAIN: CPT | Mod: NTX

## 2019-10-21 PROCEDURE — 89051 BODY FLUID CELL COUNT: CPT | Mod: NTX

## 2019-10-21 PROCEDURE — 87070 CULTURE OTHR SPECIMN AEROBIC: CPT | Mod: TXP

## 2019-10-21 PROCEDURE — 49083 ABD PARACENTESIS W/IMAGING: CPT | Mod: NTX

## 2019-10-21 NOTE — PROCEDURES
Radiology Post-Procedure Note    Pre Op Diagnosis: ascites  Post Op Diagnosis: Same    Procedure: paracentesis    Procedure performed by: Israel Hidalgo MD    Written Informed Consent Obtained: Yes  Specimen Removed: YES 3.8 L serous ascites  Estimated Blood Loss: Minimal    Findings:   Successful paracentesis    Patient tolerated procedure well.    @SIG@

## 2019-10-21 NOTE — DISCHARGE SUMMARY
Radiology Discharge Summary      Hospital Course: No complications    Admit Date: 10/21/2019  Discharge Date: 10/21/2019     Instructions Given to Patient: Yes  Diet: Resume prior diet  Activity: activity as tolerated    Description of Condition on Discharge: Stable  Vital Signs (Most Recent): Temp: 98.5 °F (36.9 °C) (10/21/19 0830)  Pulse: 82 (10/21/19 0950)  Resp: 17 (10/21/19 0935)  BP: 128/75 (10/21/19 0950)  SpO2: 100 % (10/21/19 0950)    Discharge Disposition: Home    Discharge Diagnosis: ascites     Follow-up: paracentesis prn patient comfort    @SIG@

## 2019-10-21 NOTE — H&P
Consult/H&P Note  Interventional Radiology    Consult Requested By: hepatology    Reason for Consult: ascites    SUBJECTIVE:     Chief Complaint: distension    History of Present Illness: 55 yo F with cirrhosis and ascites    Past Medical History:   Diagnosis Date    Anxiety disorder 2019    Ascites 2019    Cirrhosis     Depression 2019    Diabetes mellitus     History of cellulitis 2019    Hyponatremia 2019    Portal hypertensive gastropathy 2019    On EGD     Type 2 diabetes mellitus 2019     Past Surgical History:   Procedure Laterality Date     SECTION  ,     COLONOSCOPY      EYE SURGERY      cosmetic    HERNIA REPAIR  2006    umbilical    TONSILLECTOMY  1972    TUBAL LIGATION      tummy tuck       Family History   Problem Relation Age of Onset    Glaucoma Mother     Hypertension Mother     Heart disease Father     Prostate cancer Father      Social History     Tobacco Use    Smoking status: Never Smoker    Smokeless tobacco: Never Used   Substance Use Topics    Alcohol use: Not Currently    Drug use: Not Currently       Review of Systems:  Constitutional/General:No fever, chills, change in appetite or weight loss.  Hematological/Immuno: no known coagulopathies  Respiratory: no shortness of breath  Cardiovascular: no chest pain  Gastrointestinal: positive for - abdominal pain and gas/bloating  Genito-Urinary: no dysuria  Musculoskeletal: negative  Skin: Negative for rash, itching, pigmentation changes, nail or hair changes.  Neurological: no TIA or stroke symptoms  Psychiatric: normal mood/affect, good insight/judgement      OBJECTIVE:     Vital Signs Range (Last 24H):  Temp:  [98.5 °F (36.9 °C)]   Pulse:  [90]   Resp:  [16]   BP: (118)/(75)   SpO2:  [99 %]     Physical Exam:  General- Patient alert and oriented x3 in NAD  ENT- PERRLA,  Neck- No masses  CV- Regular rate and rhythm  Resp-  No increased WOB  GI- Non tender/+  distended  Extrem- No cyanosis, clubbing, edema.   Derm- No rashes, masses, or lesions noted  Neuro-  No focal deficits noted.     Physical Exam  There is no height or weight on file to calculate BMI.    Scheduled Meds:   Continuous Infusions:   PRN Meds:    Allergies:   Review of patient's allergies indicates:   Allergen Reactions    Penicillins        Labs:  No results for input(s): INR in the last 168 hours.    Invalid input(s):  PT,  PTT    Recent Labs   Lab 10/18/19  1358   WBC 7.79   HGB 12.3   HCT 37.7   MCV 97         Recent Labs   Lab 10/18/19  1358   *   *   K 4.4   CL 98   CO2 27   BUN 17   CREATININE 0.9   CALCIUM 10.1   ALT 21   AST 29   ALBUMIN 3.9   BILITOT 1.6*       Vitals (Most Recent):  Temp: 98.5 °F (36.9 °C) (10/21/19 0830)  Pulse: 90 (10/21/19 0830)  Resp: 16 (10/21/19 0830)  BP: 118/75 (10/21/19 0830)  SpO2: 99 % (10/21/19 0830)    ASA: 2  Mallampati: 2    Consent obtained    ASSESSMENT/PLAN:     Paracentesis.  Local anesthesia.    Active Hospital Problems    Diagnosis  POA    Alcoholic cirrhosis of liver [K70.30]  Yes      Resolved Hospital Problems   No resolved problems to display.           Israel Hidalgo MD

## 2019-10-21 NOTE — DISCHARGE INSTRUCTIONS

## 2019-10-24 ENCOUNTER — LAB VISIT (OUTPATIENT)
Dept: LAB | Facility: HOSPITAL | Age: 54
End: 2019-10-24
Attending: INTERNAL MEDICINE
Payer: COMMERCIAL

## 2019-10-24 ENCOUNTER — OFFICE VISIT (OUTPATIENT)
Dept: TRANSPLANT | Facility: CLINIC | Age: 54
End: 2019-10-24
Payer: COMMERCIAL

## 2019-10-24 VITALS
WEIGHT: 156.06 LBS | SYSTOLIC BLOOD PRESSURE: 153 MMHG | OXYGEN SATURATION: 100 % | HEART RATE: 86 BPM | RESPIRATION RATE: 17 BRPM | HEIGHT: 64 IN | DIASTOLIC BLOOD PRESSURE: 86 MMHG | BODY MASS INDEX: 26.64 KG/M2 | TEMPERATURE: 98 F

## 2019-10-24 DIAGNOSIS — Z76.82 ORGAN TRANSPLANT CANDIDATE: ICD-10-CM

## 2019-10-24 DIAGNOSIS — K76.6 PORTAL HYPERTENSIVE GASTROPATHY: ICD-10-CM

## 2019-10-24 DIAGNOSIS — K31.89 PORTAL HYPERTENSIVE GASTROPATHY: ICD-10-CM

## 2019-10-24 DIAGNOSIS — R18.8 OTHER ASCITES: Primary | ICD-10-CM

## 2019-10-24 DIAGNOSIS — K70.31 ALCOHOLIC CIRRHOSIS OF LIVER WITH ASCITES: ICD-10-CM

## 2019-10-24 LAB
ALBUMIN SERPL BCP-MCNC: 3.9 G/DL (ref 3.5–5.2)
ALP SERPL-CCNC: 167 U/L (ref 55–135)
ALT SERPL W/O P-5'-P-CCNC: 26 U/L (ref 10–44)
ANION GAP SERPL CALC-SCNC: 9 MMOL/L (ref 8–16)
AST SERPL-CCNC: 33 U/L (ref 10–40)
BASOPHILS # BLD AUTO: 0.04 K/UL (ref 0–0.2)
BASOPHILS NFR BLD: 0.5 % (ref 0–1.9)
BILIRUB SERPL-MCNC: 1.3 MG/DL (ref 0.1–1)
BUN SERPL-MCNC: 11 MG/DL (ref 6–20)
CALCIUM SERPL-MCNC: 9.8 MG/DL (ref 8.7–10.5)
CHLORIDE SERPL-SCNC: 97 MMOL/L (ref 95–110)
CO2 SERPL-SCNC: 24 MMOL/L (ref 23–29)
CREAT SERPL-MCNC: 0.9 MG/DL (ref 0.5–1.4)
DIFFERENTIAL METHOD: ABNORMAL
EOSINOPHIL # BLD AUTO: 0.5 K/UL (ref 0–0.5)
EOSINOPHIL NFR BLD: 6 % (ref 0–8)
ERYTHROCYTE [DISTWIDTH] IN BLOOD BY AUTOMATED COUNT: 13 % (ref 11.5–14.5)
EST. GFR  (AFRICAN AMERICAN): >60 ML/MIN/1.73 M^2
EST. GFR  (NON AFRICAN AMERICAN): >60 ML/MIN/1.73 M^2
GLUCOSE SERPL-MCNC: 109 MG/DL (ref 70–110)
HCT VFR BLD AUTO: 39.9 % (ref 37–48.5)
HGB BLD-MCNC: 13 G/DL (ref 12–16)
IMM GRANULOCYTES # BLD AUTO: 0.02 K/UL (ref 0–0.04)
IMM GRANULOCYTES NFR BLD AUTO: 0.3 % (ref 0–0.5)
INR PPP: 1.2 (ref 0.8–1.2)
LYMPHOCYTES # BLD AUTO: 2 K/UL (ref 1–4.8)
LYMPHOCYTES NFR BLD: 26.1 % (ref 18–48)
MCH RBC QN AUTO: 31.9 PG (ref 27–31)
MCHC RBC AUTO-ENTMCNC: 32.6 G/DL (ref 32–36)
MCV RBC AUTO: 98 FL (ref 82–98)
MONOCYTES # BLD AUTO: 0.6 K/UL (ref 0.3–1)
MONOCYTES NFR BLD: 7.2 % (ref 4–15)
NEUTROPHILS # BLD AUTO: 4.6 K/UL (ref 1.8–7.7)
NEUTROPHILS NFR BLD: 59.9 % (ref 38–73)
NRBC BLD-RTO: 0 /100 WBC
PLATELET # BLD AUTO: 211 K/UL (ref 150–350)
PMV BLD AUTO: 9.6 FL (ref 9.2–12.9)
POTASSIUM SERPL-SCNC: 4.3 MMOL/L (ref 3.5–5.1)
PROT SERPL-MCNC: 7.8 G/DL (ref 6–8.4)
PROTHROMBIN TIME: 12.3 SEC (ref 9–12.5)
RBC # BLD AUTO: 4.07 M/UL (ref 4–5.4)
SODIUM SERPL-SCNC: 130 MMOL/L (ref 136–145)
WBC # BLD AUTO: 7.65 K/UL (ref 3.9–12.7)

## 2019-10-24 PROCEDURE — 80053 COMPREHEN METABOLIC PANEL: CPT | Mod: NTX

## 2019-10-24 PROCEDURE — 3008F PR BODY MASS INDEX (BMI) DOCUMENTED: ICD-10-PCS | Mod: CPTII,NTX,S$GLB, | Performed by: INTERNAL MEDICINE

## 2019-10-24 PROCEDURE — 3008F BODY MASS INDEX DOCD: CPT | Mod: CPTII,NTX,S$GLB, | Performed by: INTERNAL MEDICINE

## 2019-10-24 PROCEDURE — 99215 PR OFFICE/OUTPT VISIT, EST, LEVL V, 40-54 MIN: ICD-10-PCS | Mod: NTX,S$GLB,, | Performed by: INTERNAL MEDICINE

## 2019-10-24 PROCEDURE — 85025 COMPLETE CBC W/AUTO DIFF WBC: CPT | Mod: TXP

## 2019-10-24 PROCEDURE — 99215 OFFICE O/P EST HI 40 MIN: CPT | Mod: NTX,S$GLB,, | Performed by: INTERNAL MEDICINE

## 2019-10-24 PROCEDURE — 85610 PROTHROMBIN TIME: CPT | Mod: TXP

## 2019-10-24 PROCEDURE — 36415 COLL VENOUS BLD VENIPUNCTURE: CPT | Mod: PN,TXP

## 2019-10-24 PROCEDURE — 99999 PR PBB SHADOW E&M-EST. PATIENT-LVL III: CPT | Mod: PBBFAC,TXP,, | Performed by: INTERNAL MEDICINE

## 2019-10-24 PROCEDURE — 80321 ALCOHOLS BIOMARKERS 1OR 2: CPT | Mod: NTX

## 2019-10-24 PROCEDURE — 99999 PR PBB SHADOW E&M-EST. PATIENT-LVL III: ICD-10-PCS | Mod: PBBFAC,TXP,, | Performed by: INTERNAL MEDICINE

## 2019-10-24 RX ORDER — METOLAZONE 2.5 MG/1
TABLET ORAL
Qty: 30 TABLET | Refills: 11 | Status: SHIPPED | OUTPATIENT
Start: 2019-10-24 | End: 2021-01-25

## 2019-10-24 RX ORDER — FUROSEMIDE 40 MG/1
40 TABLET ORAL DAILY
Qty: 30 TABLET | Refills: 11 | Status: SHIPPED | OUTPATIENT
Start: 2019-10-24 | End: 2019-12-12

## 2019-10-24 RX ORDER — SPIRONOLACTONE 100 MG/1
100 TABLET, FILM COATED ORAL DAILY
Qty: 30 TABLET | Refills: 11 | Status: SHIPPED | OUTPATIENT
Start: 2019-10-24 | End: 2019-12-12

## 2019-10-24 NOTE — Clinical Note
1. Take metolazone 2.5 mg every other day, 30 min prior to other water pills2. Reduce sprionolactone to 100 mg daily3. Add lasix 40 mg daily4. Weekly labs x 4 5. Continue with liver tx evaluationReturn 4 weeks

## 2019-10-24 NOTE — PROGRESS NOTES
HEPATOLOGY FOLLOW UP    Referring Physician: Brett Michele MD  Current Corresponding Physician: Brett Michele MD    Brunilda Delgadillo is here for follow up of Cirrhosis  She is a 54 y.o. 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 She is a homemaker and is accompanied by her  today who is a . She has a 14 and 17 year old who live at home.     --pt has drank alcohol most of her adult life; last drink ~1 month ago  --developed ascites June 2019 and has had 3 paracenteses (last one month ago)  --lasix on hold due to hyponatremia- as low as 119.  --pt is eating low salt diet  --no HE  --PHG but no varices on EGD 11/16     Labs 6/18/19: plts 313, INR 1.4, , Na 125, creat 0.8, Na 119   MELD-Na score: 10 at 8/21/2019  1:10 PM  MELD score: 10 at 8/21/2019  1:10 PM  Calculated from:  Serum Creatinine: 0.8 mg/dL (Rounded to 1 mg/dL) at 8/21/2019  1:10 PM  Serum Sodium: 129 mmol/L at 8/21/2019  1:10 PM  Total Bilirubin: 1.7 mg/dL at 8/21/2019  1:10 PM  INR(ratio): 1.2 at 8/21/2019  1:10 PM  Age: 54 years    Interval History  Since Brunilda Delgadillo's last visit she continues on spironolactone but is now on 300 mg daily. She is still requiring LVPs. Last one was     She will start a liver transplant evaluation for large ascites and hyponatremia 12/12 and 12/13/19.    She is still requiring LVPs (8/29/19, 9/12/19, 10/21/19. She has no symptoms of HE.     MELD-Na score: 11 at 9/12/2019  9:25 AM  MELD score: 11 at 9/12/2019  9:25 AM  Calculated from:  Serum Creatinine: 0.8 mg/dL (Rounded to 1 mg/dL) at 9/12/2019  9:25 AM  Serum Sodium: 135 mmol/L at 9/12/2019  9:25 AM  Total Bilirubin: 1.4 mg/dL at 9/12/2019  9:25 AM  INR(ratio): 1.3 at 9/12/2019  9:25 AM  Age: 54 years    Outpatient Encounter Medications as of 10/24/2019   Medication Sig Dispense Refill    DULoxetine (CYMBALTA) 60 MG capsule Take 60 mg by mouth once daily.       insulin detemir  "(LEVEMIR U-100 INSULIN SUBQ) Inject 40 Units into the skin once daily.      multivit with minerals/lutein (MULTIVITAMIN 50 PLUS ORAL) Take by mouth.      pen needle, diabetic 30 gauge x 1/3" Ndle NovoFine 30 30 gauge x 1/3" needle      spironolactone (ALDACTONE) 100 MG tablet Take 100 mg by mouth.      thiamine (VITAMIN B-1) 100 MG tablet Take 100 mg by mouth.      thiamine 100 MG tablet Take 100 mg by mouth once daily.       No facility-administered encounter medications on file as of 10/24/2019.      Review of patient's allergies indicates:   Allergen Reactions    Penicillins      Past Medical History:   Diagnosis Date    Anxiety disorder 8/21/2019    Ascites 8/21/2019    Cirrhosis     Depression 8/21/2019    Diabetes mellitus     History of cellulitis 8/21/2019    Hyponatremia 8/21/2019    Portal hypertensive gastropathy 8/21/2019    On EGD 11/16    Type 2 diabetes mellitus 8/21/2019       Review of Systems   Constitutional: Negative.    HENT: Negative.    Eyes: Negative.    Respiratory: Negative.    Cardiovascular: Negative.    Gastrointestinal: Negative.    Genitourinary: Negative.    Musculoskeletal: Negative.    Skin: Negative.    Neurological: Negative.    Psychiatric/Behavioral: Negative.      Vitals:    10/24/19 1411   BP: (!) 153/86   Pulse: 86   Resp: 17   Temp: 98.1 °F (36.7 °C)       Physical Exam   Constitutional: She is oriented to person, place, and time. She appears well-developed and well-nourished.   HENT:   Head: Normocephalic.   Eyes: Pupils are equal, round, and reactive to light. No scleral icterus.   Neck: Neck supple. No thyromegaly present.   Cardiovascular: Normal rate, regular rhythm and normal heart sounds.   Pulmonary/Chest: Effort normal and breath sounds normal. She has no wheezes.   Abdominal: Soft. She exhibits no distension and no mass. There is no tenderness.   Musculoskeletal: Normal range of motion. She exhibits no edema.   Neurological: She is oriented to " person, place, and time.   Skin: Skin is warm and dry. No rash noted.   Psychiatric: She has a normal mood and affect.   Vitals reviewed.      Lab Results   Component Value Date     (H) 10/18/2019    BUN 17 10/18/2019    CREATININE 0.9 10/18/2019    CALCIUM 10.1 10/18/2019     (L) 10/18/2019    K 4.4 10/18/2019    CL 98 10/18/2019    PROT 8.0 10/18/2019    CO2 27 10/18/2019    ANIONGAP 7 (L) 10/18/2019    WBC 7.79 10/18/2019    RBC 3.87 (L) 10/18/2019    HGB 12.3 10/18/2019    HCT 37.7 10/18/2019    MCV 97 10/18/2019    MCH 31.8 (H) 10/18/2019    MCHC 32.6 10/18/2019     Lab Results   Component Value Date    RDW 13.2 10/18/2019     10/18/2019    MPV 9.3 10/18/2019    GRAN 4.7 10/18/2019    GRAN 60.0 10/18/2019    LYMPH 1.9 10/18/2019    LYMPH 23.9 10/18/2019    MONO 0.7 10/18/2019    MONO 8.3 10/18/2019    EOSINOPHIL 6.7 10/18/2019    BASOPHIL 0.6 10/18/2019    EOS 0.5 10/18/2019    BASO 0.05 10/18/2019    GROUPTRH O POS 08/21/2019    ALBUMIN 3.9 10/18/2019    BILIDIR 1.1 (H) 08/21/2019    AST 29 10/18/2019    ALT 21 10/18/2019    ALKPHOS 178 (H) 10/18/2019    LABPROT 12.7 (H) 09/12/2019    INR 1.3 (H) 09/12/2019       Assessment and Plan:    Brunilda Delgadillo is a 54 y.o. female withCirrhosis  Current recommendations:  1. Ascites, ongoing, not well controlled: recommend starting metolazone 2.5 mg every other day and add lasix 40 mg daily and decrease spironolactone to 100 mg daily; weekly labs to monitor lytes and creat  2. PHG, recommend repeat EGD  3. Cirrhosis, decompensated: liver tx evaluation as scheduled  4. Alcohol abuse, with <6 months sobriety: addiciton psych to assess; pt not interested in rehab  5. DM, poorly controlled with hemogolbin A1c of 10.8: to see endocrinology again- no changes made to her regimen.    Return 4 weeks

## 2019-10-24 NOTE — LETTER
October 24, 2019        Silviano Clark  [address]  [address]  [facility]  [facility] [address]  Phone: [phone]  Fax: [phone]             [facility]  [address] [address]  Phone: [phone]  Fax: [phone]   Patient: Brunilda Delgadillo   MR Number: [id number]   YOB: 1965   Date of Visit: 10/24/2019       Dear Dr. Silviano Clark    Thank you for referring Brunilda Delgadillo to me for evaluation. Attached you will find relevant portions of my assessment and plan of care.    If you have questions, please do not hesitate to call me. I look forward to following Brunilda Delgadillo along with you.    Sincerely,    Soraida Serna MD    Enclosure    If you would like to receive this communication electronically, please contact [email or web address] or [phone] to request [employer] access.    [employer] is a tool which provides read-only access to select patient information with whom you have a relationship. Its easy to use and provides real time access to review your patients record including encounter summaries, notes, results, and demographic information.    If you feel you have received this communication in error or would no longer like to receive these types of communications, please e-mail [email or web address]

## 2019-10-25 LAB
BACTERIA FLD AEROBE CULT: NO GROWTH
GRAM STN SPEC: NORMAL
GRAM STN SPEC: NORMAL

## 2019-10-29 DIAGNOSIS — B18.2 CHRONIC HEPATITIS C WITHOUT HEPATIC COMA: ICD-10-CM

## 2019-10-29 DIAGNOSIS — K70.31 ALCOHOLIC CIRRHOSIS OF LIVER WITH ASCITES: Primary | ICD-10-CM

## 2019-10-29 DIAGNOSIS — Z76.82 ORGAN TRANSPLANT CANDIDATE: ICD-10-CM

## 2019-10-30 LAB — PHOSPHATIDYLETHANOL (PETH): 31 NG/ML

## 2019-11-03 LAB
ALBUMIN SERPL-MCNC: 4.8 G/DL (ref 3.5–5.5)
ALBUMIN/GLOB SERPL: 1.4 {RATIO} (ref 1.2–2.2)
ALP SERPL-CCNC: 195 IU/L (ref 39–117)
ALT SERPL-CCNC: 45 IU/L (ref 0–32)
AST SERPL-CCNC: 54 IU/L (ref 0–40)
BILIRUB SERPL-MCNC: 0.8 MG/DL (ref 0–1.2)
BUN SERPL-MCNC: 11 MG/DL (ref 6–24)
BUN/CREAT SERPL: 10 (ref 9–23)
CALCIUM SERPL-MCNC: 10.2 MG/DL (ref 8.7–10.2)
CHLORIDE SERPL-SCNC: 81 MMOL/L (ref 96–106)
CO2 SERPL-SCNC: 27 MMOL/L (ref 20–29)
CREAT SERPL-MCNC: 1.07 MG/DL (ref 0.57–1)
GLOBULIN SER CALC-MCNC: 3.4 G/DL (ref 1.5–4.5)
GLUCOSE SERPL-MCNC: 153 MG/DL (ref 65–99)
POTASSIUM SERPL-SCNC: 2.8 MMOL/L (ref 3.5–5.2)
PROT SERPL-MCNC: 8.2 G/DL (ref 6–8.5)
SODIUM SERPL-SCNC: 128 MMOL/L (ref 134–144)

## 2019-11-05 ENCOUNTER — TELEPHONE (OUTPATIENT)
Dept: TRANSPLANT | Facility: CLINIC | Age: 54
End: 2019-11-05

## 2019-11-05 DIAGNOSIS — B18.2 CHRONIC HEPATITIS C WITHOUT HEPATIC COMA: ICD-10-CM

## 2019-11-05 DIAGNOSIS — Z76.82 ORGAN TRANSPLANT CANDIDATE: Primary | ICD-10-CM

## 2019-11-05 DIAGNOSIS — K70.31 ALCOHOLIC CIRRHOSIS OF LIVER WITH ASCITES: ICD-10-CM

## 2019-11-05 RX ORDER — POTASSIUM CHLORIDE 1.5 G/1.58G
40 POWDER, FOR SOLUTION ORAL ONCE
Qty: 2 PACKET | Refills: 0 | Status: SHIPPED | OUTPATIENT
Start: 2019-11-05 | End: 2019-11-05

## 2019-11-05 NOTE — TELEPHONE ENCOUNTER
----- Message from Soraida Serna MD sent at 11/4/2019  8:22 PM CST -----  Hold metolazone (how much weight has she lost?) give 40 meq K and repeat k next day - please let patient know.

## 2019-11-05 NOTE — TELEPHONE ENCOUNTER
Called pt to discuss labs from last week. No answer. Left detailed VM asking pt to hold her metolazone, take potassium supplements (prescription routed to pt's preferred pharmacy) & call coordinator back ASAP to discuss labs/weight loss/recommendations further. Callback number provided.

## 2019-11-05 NOTE — TELEPHONE ENCOUNTER
Pt returned my call from this morning. Explained to her that Dr. Serna is recommending that she hold her metolazone, take 40 mEq of K+ & repeat labs tomorrow. Lab slip faxed to [facility] in [place]. Understanding expressed.

## 2019-11-07 LAB
ALBUMIN SERPL-MCNC: 4.4 G/DL (ref 3.5–5.5)
ALBUMIN/GLOB SERPL: 1.4 {RATIO} (ref 1.2–2.2)
ALP SERPL-CCNC: 205 IU/L (ref 39–117)
ALT SERPL-CCNC: 40 IU/L (ref 0–32)
AST SERPL-CCNC: 42 IU/L (ref 0–40)
BILIRUB SERPL-MCNC: 0.6 MG/DL (ref 0–1.2)
BUN SERPL-MCNC: 17 MG/DL (ref 6–24)
BUN/CREAT SERPL: 22 (ref 9–23)
CALCIUM SERPL-MCNC: 9.7 MG/DL (ref 8.7–10.2)
CHLORIDE SERPL-SCNC: 85 MMOL/L (ref 96–106)
CO2 SERPL-SCNC: 29 MMOL/L (ref 20–29)
CREAT SERPL-MCNC: 0.77 MG/DL (ref 0.57–1)
GLOBULIN SER CALC-MCNC: 3.2 G/DL (ref 1.5–4.5)
GLUCOSE SERPL-MCNC: 120 MG/DL (ref 65–99)
POTASSIUM SERPL-SCNC: 3.2 MMOL/L (ref 3.5–5.2)
PROT SERPL-MCNC: 7.6 G/DL (ref 6–8.5)
SODIUM SERPL-SCNC: 129 MMOL/L (ref 134–144)

## 2019-11-08 ENCOUNTER — PATIENT MESSAGE (OUTPATIENT)
Dept: TRANSPLANT | Facility: CLINIC | Age: 54
End: 2019-11-08

## 2019-11-08 DIAGNOSIS — Z76.82 ORGAN TRANSPLANT CANDIDATE: Primary | ICD-10-CM

## 2019-11-08 DIAGNOSIS — K70.31 ALCOHOLIC CIRRHOSIS OF LIVER WITH ASCITES: ICD-10-CM

## 2019-11-08 DIAGNOSIS — B18.2 CHRONIC HEPATITIS C WITHOUT HEPATIC COMA: ICD-10-CM

## 2019-11-10 LAB
ALBUMIN SERPL-MCNC: 4.3 G/DL (ref 3.5–5.5)
ALBUMIN/GLOB SERPL: 1.5 {RATIO} (ref 1.2–2.2)
ALP SERPL-CCNC: 170 IU/L (ref 39–117)
ALT SERPL-CCNC: 37 IU/L (ref 0–32)
AST SERPL-CCNC: 36 IU/L (ref 0–40)
BILIRUB SERPL-MCNC: 0.9 MG/DL (ref 0–1.2)
BUN SERPL-MCNC: 12 MG/DL (ref 6–24)
BUN/CREAT SERPL: 18 (ref 9–23)
CALCIUM SERPL-MCNC: 9.3 MG/DL (ref 8.7–10.2)
CHLORIDE SERPL-SCNC: 86 MMOL/L (ref 96–106)
CO2 SERPL-SCNC: 24 MMOL/L (ref 20–29)
CREAT SERPL-MCNC: 0.66 MG/DL (ref 0.57–1)
GLOBULIN SER CALC-MCNC: 2.8 G/DL (ref 1.5–4.5)
GLUCOSE SERPL-MCNC: 188 MG/DL (ref 65–99)
POTASSIUM SERPL-SCNC: 3.6 MMOL/L (ref 3.5–5.2)
PROT SERPL-MCNC: 7.1 G/DL (ref 6–8.5)
SODIUM SERPL-SCNC: 128 MMOL/L (ref 134–144)

## 2019-11-15 ENCOUNTER — TELEPHONE (OUTPATIENT)
Dept: TRANSPLANT | Facility: CLINIC | Age: 54
End: 2019-11-15

## 2019-11-15 NOTE — TELEPHONE ENCOUNTER
----- Message from Ary Luciano sent at 11/15/2019  3:38 PM CST -----  Contact: Brunilda Hinton was returning call. Pt can be reached at [phone]

## 2019-11-15 NOTE — TELEPHONE ENCOUNTER
Returned call to pt to review lab results. Pt inquired re: resuming diuretics. Messaged hepatologist.

## 2019-11-15 NOTE — TELEPHONE ENCOUNTER
----- Message from Trupti Schroeder sent at 11/15/2019  1:16 PM CST -----  Regarding: FW: RTC    Called and sp to pt about appt; pt aurelia'ed for 11/21.    ----- Message -----  From: Rickey Ortega RN  Sent: 11/8/2019  11:52 AM CST  To: Trupti Schroeder  Subject: RTC                                              RTC in 4 weeks with Daphne with MELD & PEth.

## 2019-11-15 NOTE — TELEPHONE ENCOUNTER
----- Message from Wang Scott sent at 11/15/2019 10:09 AM CST -----  Contact: Brunilda  Ms. Delgadillo would like for Rickey to contact her. She has some questions that she would like to ask her. She can be reached at [phone]

## 2019-11-21 ENCOUNTER — LAB VISIT (OUTPATIENT)
Dept: LAB | Facility: HOSPITAL | Age: 54
End: 2019-11-21
Attending: INTERNAL MEDICINE
Payer: COMMERCIAL

## 2019-11-21 ENCOUNTER — OFFICE VISIT (OUTPATIENT)
Dept: TRANSPLANT | Facility: CLINIC | Age: 54
End: 2019-11-21
Payer: COMMERCIAL

## 2019-11-21 VITALS
BODY MASS INDEX: 26.57 KG/M2 | DIASTOLIC BLOOD PRESSURE: 71 MMHG | HEIGHT: 64 IN | TEMPERATURE: 99 F | SYSTOLIC BLOOD PRESSURE: 135 MMHG | WEIGHT: 155.63 LBS | OXYGEN SATURATION: 100 % | HEART RATE: 88 BPM | RESPIRATION RATE: 18 BRPM

## 2019-11-21 DIAGNOSIS — B18.2 CHRONIC HEPATITIS C WITHOUT HEPATIC COMA: ICD-10-CM

## 2019-11-21 DIAGNOSIS — G62.9 NEUROPATHY: Primary | ICD-10-CM

## 2019-11-21 DIAGNOSIS — K70.31 ALCOHOLIC CIRRHOSIS OF LIVER WITH ASCITES: ICD-10-CM

## 2019-11-21 DIAGNOSIS — Z76.82 ORGAN TRANSPLANT CANDIDATE: ICD-10-CM

## 2019-11-21 DIAGNOSIS — L03.90 CELLULITIS, UNSPECIFIED CELLULITIS SITE: ICD-10-CM

## 2019-11-21 LAB
ALBUMIN SERPL BCP-MCNC: 3.9 G/DL (ref 3.5–5.2)
ALP SERPL-CCNC: 191 U/L (ref 55–135)
ALT SERPL W/O P-5'-P-CCNC: 33 U/L (ref 10–44)
ANION GAP SERPL CALC-SCNC: 10 MMOL/L (ref 8–16)
AST SERPL-CCNC: 34 U/L (ref 10–40)
BASOPHILS # BLD AUTO: 0.06 K/UL (ref 0–0.2)
BASOPHILS NFR BLD: 0.8 % (ref 0–1.9)
BILIRUB SERPL-MCNC: 1.6 MG/DL (ref 0.1–1)
BUN SERPL-MCNC: 12 MG/DL (ref 6–20)
CALCIUM SERPL-MCNC: 9.9 MG/DL (ref 8.7–10.5)
CHLORIDE SERPL-SCNC: 95 MMOL/L (ref 95–110)
CO2 SERPL-SCNC: 28 MMOL/L (ref 23–29)
CREAT SERPL-MCNC: 0.8 MG/DL (ref 0.5–1.4)
DIFFERENTIAL METHOD: ABNORMAL
EOSINOPHIL # BLD AUTO: 0.5 K/UL (ref 0–0.5)
EOSINOPHIL NFR BLD: 6 % (ref 0–8)
ERYTHROCYTE [DISTWIDTH] IN BLOOD BY AUTOMATED COUNT: 13 % (ref 11.5–14.5)
EST. GFR  (AFRICAN AMERICAN): >60 ML/MIN/1.73 M^2
EST. GFR  (NON AFRICAN AMERICAN): >60 ML/MIN/1.73 M^2
GLUCOSE SERPL-MCNC: 116 MG/DL (ref 70–110)
HCT VFR BLD AUTO: 37.5 % (ref 37–48.5)
HGB BLD-MCNC: 12.1 G/DL (ref 12–16)
IMM GRANULOCYTES # BLD AUTO: 0.02 K/UL (ref 0–0.04)
IMM GRANULOCYTES NFR BLD AUTO: 0.3 % (ref 0–0.5)
INR PPP: 1.2 (ref 0.8–1.2)
LYMPHOCYTES # BLD AUTO: 1.9 K/UL (ref 1–4.8)
LYMPHOCYTES NFR BLD: 23.3 % (ref 18–48)
MCH RBC QN AUTO: 31.5 PG (ref 27–31)
MCHC RBC AUTO-ENTMCNC: 32.3 G/DL (ref 32–36)
MCV RBC AUTO: 98 FL (ref 82–98)
MONOCYTES # BLD AUTO: 0.6 K/UL (ref 0.3–1)
MONOCYTES NFR BLD: 7.8 % (ref 4–15)
NEUTROPHILS # BLD AUTO: 4.9 K/UL (ref 1.8–7.7)
NEUTROPHILS NFR BLD: 61.8 % (ref 38–73)
NRBC BLD-RTO: 0 /100 WBC
PLATELET # BLD AUTO: 184 K/UL (ref 150–350)
PMV BLD AUTO: 9.5 FL (ref 9.2–12.9)
POTASSIUM SERPL-SCNC: 3.8 MMOL/L (ref 3.5–5.1)
PROT SERPL-MCNC: 7.5 G/DL (ref 6–8.4)
PROTHROMBIN TIME: 11.8 SEC (ref 9–12.5)
RBC # BLD AUTO: 3.84 M/UL (ref 4–5.4)
SODIUM SERPL-SCNC: 133 MMOL/L (ref 136–145)
WBC # BLD AUTO: 7.97 K/UL (ref 3.9–12.7)

## 2019-11-21 PROCEDURE — 3008F BODY MASS INDEX DOCD: CPT | Mod: CPTII,NTX,S$GLB, | Performed by: INTERNAL MEDICINE

## 2019-11-21 PROCEDURE — 99215 PR OFFICE/OUTPT VISIT, EST, LEVL V, 40-54 MIN: ICD-10-PCS | Mod: NTX,S$GLB,, | Performed by: INTERNAL MEDICINE

## 2019-11-21 PROCEDURE — 80321 ALCOHOLS BIOMARKERS 1OR 2: CPT | Mod: NTX

## 2019-11-21 PROCEDURE — 36415 COLL VENOUS BLD VENIPUNCTURE: CPT | Mod: PN,TXP

## 2019-11-21 PROCEDURE — 85610 PROTHROMBIN TIME: CPT | Mod: TXP

## 2019-11-21 PROCEDURE — 99999 PR PBB SHADOW E&M-EST. PATIENT-LVL III: CPT | Mod: PBBFAC,TXP,, | Performed by: INTERNAL MEDICINE

## 2019-11-21 PROCEDURE — 85025 COMPLETE CBC W/AUTO DIFF WBC: CPT | Mod: NTX

## 2019-11-21 PROCEDURE — 3008F PR BODY MASS INDEX (BMI) DOCUMENTED: ICD-10-PCS | Mod: CPTII,NTX,S$GLB, | Performed by: INTERNAL MEDICINE

## 2019-11-21 PROCEDURE — 99215 OFFICE O/P EST HI 40 MIN: CPT | Mod: NTX,S$GLB,, | Performed by: INTERNAL MEDICINE

## 2019-11-21 PROCEDURE — 99999 PR PBB SHADOW E&M-EST. PATIENT-LVL III: ICD-10-PCS | Mod: PBBFAC,TXP,, | Performed by: INTERNAL MEDICINE

## 2019-11-21 PROCEDURE — 80053 COMPREHEN METABOLIC PANEL: CPT | Mod: TXP

## 2019-11-21 RX ORDER — DOXYCYCLINE HYCLATE 100 MG
100 TABLET ORAL 2 TIMES DAILY
Qty: 14 TABLET | Refills: 0 | Status: SHIPPED | OUTPATIENT
Start: 2019-11-21 | End: 2019-12-12 | Stop reason: ALTCHOICE

## 2019-11-21 RX ORDER — GABAPENTIN 300 MG/1
300 CAPSULE ORAL NIGHTLY
Qty: 30 CAPSULE | Refills: 11 | Status: SHIPPED | OUTPATIENT
Start: 2019-11-21 | End: 2020-01-13

## 2019-11-21 NOTE — PROGRESS NOTES
HEPATOLOGY FOLLOW UP    Referring Physician: Brett Michele MD  Current Corresponding Physician: Brett Michele MD    Brunilda Delgadillo is here for follow up of Cirrhosis and Ascites  She is a 54 y.o. 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 She has a 14 and 17 year old who live at home.     --pt has drank alcohol most of her adult life; last drink ~1 month ago  --developed ascites June 2019 and has had 3 paracenteses (last one month ago)  --lasix on hold due to hyponatremia- as low as 119.  --pt is eating low salt diet  --no HE  --PHG but no varices on EGD 11/16     Labs 6/18/19: plts 313, INR 1.4, , Na 125, creat 0.8, Na 119   MELD-Na score: 10 at 8/21/2019  1:10 PM  MELD score: 10 at 8/21/2019  1:10 PM  Calculated from:  Serum Creatinine: 0.8 mg/dL (Rounded to 1 mg/dL) at 8/21/2019  1:10 PM  Serum Sodium: 129 mmol/L at 8/21/2019  1:10 PM  Total Bilirubin: 1.7 mg/dL at 8/21/2019  1:10 PM  INR(ratio): 1.2 at 8/21/2019  1:10 PM  Age: 54 years    Interval History  Since Brunilda Delgadillo's last visit she continues on spironolactone, lasix and metolazone. Her last LVP was 3.8 L on 10/21/19 (LVPs (8/29/19, 9/12/19, 10/21/19). She has no symptoms of HE.    She is undergoing a liver transplant evaluation for large ascites and hyponatremia.     MELD-Na score: 10 at 11/21/2019  3:20 PM  MELD score: 10 at 11/21/2019  3:20 PM  Calculated from:  Serum Creatinine: 0.8 mg/dL (Rounded to 1 mg/dL) at 11/21/2019  3:20 PM  Serum Sodium: 133 mmol/L at 11/21/2019  3:20 PM  Total Bilirubin: 1.6 mg/dL at 11/21/2019  3:20 PM  INR(ratio): 1.2 at 11/21/2019  3:20 PM  Age: 54 years    Outpatient Encounter Medications as of 11/21/2019   Medication Sig Dispense Refill    DULoxetine (CYMBALTA) 60 MG capsule Take 60 mg by mouth once daily.       furosemide (LASIX) 40 MG tablet Take 1 tablet (40 mg total) by mouth once daily. 30 tablet 11    insulin detemir (LEVEMIR U-100 INSULIN  "SUBQ) Inject 40 Units into the skin once daily.      multivit with minerals/lutein (MULTIVITAMIN 50 PLUS ORAL) Take by mouth.      pen needle, diabetic 30 gauge x 1/3" Ndle NovoFine 30 30 gauge x 1/3" needle      spironolactone (ALDACTONE) 100 MG tablet Take 1 tablet (100 mg total) by mouth once daily. 30 tablet 11    thiamine (VITAMIN B-1) 100 MG tablet Take 100 mg by mouth.      thiamine 100 MG tablet Take 100 mg by mouth once daily.      metOLazone (ZAROXOLYN) 2.5 MG tablet Take every other day 30 min prior to other diuretics (Patient not taking: Reported on 11/21/2019) 30 tablet 11     No facility-administered encounter medications on file as of 11/21/2019.      Review of patient's allergies indicates:   Allergen Reactions    Penicillins      Past Medical History:   Diagnosis Date    Anxiety disorder 8/21/2019    Ascites 8/21/2019    Cirrhosis     Depression 8/21/2019    Diabetes mellitus     History of cellulitis 8/21/2019    Hyponatremia 8/21/2019    Portal hypertensive gastropathy 8/21/2019    On EGD 11/16    Type 2 diabetes mellitus 8/21/2019       Review of Systems   Constitutional: Negative.    HENT: Negative.    Eyes: Negative.    Respiratory: Negative.    Cardiovascular: Negative.    Gastrointestinal: Negative.    Genitourinary: Negative.    Musculoskeletal: Negative.    Skin: Negative.    Neurological: Negative.    Psychiatric/Behavioral: Negative.      Vitals:    11/21/19 1529   BP: 135/71   Pulse: 88   Resp: 18   Temp: 99 °F (37.2 °C)       Physical Exam   Constitutional: She is oriented to person, place, and time. She appears well-developed and well-nourished.   HENT:   Head: Normocephalic.   Eyes: Pupils are equal, round, and reactive to light. No scleral icterus.   Neck: Neck supple. No thyromegaly present.   Cardiovascular: Normal rate, regular rhythm and normal heart sounds.   Pulmonary/Chest: Effort normal and breath sounds normal. She has no wheezes.   Abdominal: Soft. She " exhibits no distension and no mass. There is no tenderness.   Musculoskeletal: Normal range of motion. She exhibits no edema.   Neurological: She is oriented to person, place, and time.   Skin: Skin is warm and dry. No rash noted.   Psychiatric: She has a normal mood and affect.   Vitals reviewed.      Lab Results   Component Value Date     (H) 11/09/2019    BUN 12 11/09/2019    CREATININE 0.66 11/09/2019    CALCIUM 9.3 11/09/2019     (L) 11/09/2019    K 3.6 11/09/2019    CL 86 (L) 11/09/2019    PROT 7.1 11/09/2019    CO2 24 11/09/2019    ANIONGAP 9 10/24/2019    WBC 7.65 10/24/2019    RBC 4.07 10/24/2019    HGB 13.0 10/24/2019    HCT 39.9 10/24/2019    MCV 98 10/24/2019    MCH 31.9 (H) 10/24/2019    MCHC 32.6 10/24/2019     Lab Results   Component Value Date    RDW 13.0 10/24/2019     10/24/2019    MPV 9.6 10/24/2019    GRAN 4.6 10/24/2019    GRAN 59.9 10/24/2019    LYMPH 2.0 10/24/2019    LYMPH 26.1 10/24/2019    MONO 0.6 10/24/2019    MONO 7.2 10/24/2019    EOSINOPHIL 6.0 10/24/2019    BASOPHIL 0.5 10/24/2019    EOS 0.5 10/24/2019    BASO 0.04 10/24/2019    GROUPTRH O POS 08/21/2019    ALBUMIN 4.3 11/09/2019    BILIDIR 1.1 (H) 08/21/2019    AST 36 11/09/2019    ALT 37 (H) 11/09/2019    ALKPHOS 170 (H) 11/09/2019    LABPROT 12.3 10/24/2019    INR 1.2 10/24/2019       Assessment and Plan:    Brunilda Delgadillo is a 54 y.o. female withCirrhosis and Ascites  Current recommendations:  1. Ascites, ongoing, but may be improving with abstinence from alcohol. Meld low. May consider tips in the future if large fluid continues  2. PHG, recommend repeat EGD  3. Cirrhosis, decompensated: copmlete liver tx evaluation  4. Alcohol abuse, with <6 months sobriety: addiciton psych to assess; pt remains not interested in rehab  5. DM, ongoing: monitor  6. Doxycycline for possible arm cellulitus  7. Gabapentin at night for nerve pain  Return 3 months    .

## 2019-11-21 NOTE — PATIENT INSTRUCTIONS
1. I will review your labs from today  2. Hopefully with time your fluid will improve; may consider TIPS if fluid does not improve  3. Doxycycline for arm- possible cellulitus  4. Gabapentin 300 mg at night  5. Transplant evaluation as scheduled- will consider TIPS  Return 01/20

## 2019-11-21 NOTE — Clinical Note
1. I will review your labs from today2. Hopefully with time your fluid will improve; may consider TIPS if fluid does not improve3. Doxycycline for arm- possible cellulitus4. Gabapentin 300 mg at night5. Transplant evaluation as scheduled- will consider TIPSReturn 01/20

## 2019-11-21 NOTE — LETTER
November 24, 2019        Silviano Clark  [address]  [address]  [facility]  [facility] [address]  Phone: [phone]  Fax: [phone]             [place] [facility] Trans[facility]  [address] [address]  Phone: [phone]  Fax: [phone]   Patient: Brunilda Delgadillo   MR Number: [id number]   YOB: 1965   Date of Visit: 11/21/2019       Dear Dr. Silviano Clark    Thank you for referring Brunilda Delgadillo to me for evaluation. Attached you will find relevant portions of my assessment and plan of care.    If you have questions, please do not hesitate to call me. I look forward to following Brunilda Delgadillo along with you.    Sincerely,    Soraida Serna MD    Enclosure    If you would like to receive this communication electronically, please contact [email or web address] or [phone] to request [facility] access.    [facility] is a tool which provides read-only access to select patient information with whom you have a relationship. Its easy to use and provides real time access to review your patients record including encounter summaries, notes, results, and demographic information.    If you feel you have received this communication in error or would no longer like to receive these types of communications, please e-mail [email or web address]

## 2019-11-26 ENCOUNTER — OFFICE VISIT (OUTPATIENT)
Dept: INFECTIOUS DISEASES | Facility: CLINIC | Age: 54
End: 2019-11-26
Payer: COMMERCIAL

## 2019-11-26 VITALS
WEIGHT: 148.56 LBS | HEART RATE: 103 BPM | BODY MASS INDEX: 25.36 KG/M2 | DIASTOLIC BLOOD PRESSURE: 82 MMHG | SYSTOLIC BLOOD PRESSURE: 128 MMHG | HEIGHT: 64 IN | TEMPERATURE: 98 F

## 2019-11-26 DIAGNOSIS — K70.31 ALCOHOLIC CIRRHOSIS OF LIVER WITH ASCITES: ICD-10-CM

## 2019-11-26 DIAGNOSIS — Z76.82 ORGAN TRANSPLANT CANDIDATE: ICD-10-CM

## 2019-11-26 DIAGNOSIS — K70.31 ALCOHOLIC CIRRHOSIS OF LIVER WITH ASCITES: Primary | ICD-10-CM

## 2019-11-26 PROCEDURE — 99204 PR OFFICE/OUTPT VISIT, NEW, LEVL IV, 45-59 MIN: ICD-10-PCS | Mod: S$GLB,TXP,, | Performed by: PHYSICIAN ASSISTANT

## 2019-11-26 PROCEDURE — 99999 PR PBB SHADOW E&M-EST. PATIENT-LVL III: ICD-10-PCS | Mod: PBBFAC,TXP,, | Performed by: PHYSICIAN ASSISTANT

## 2019-11-26 PROCEDURE — 99999 PR PBB SHADOW E&M-EST. PATIENT-LVL III: CPT | Mod: PBBFAC,TXP,, | Performed by: PHYSICIAN ASSISTANT

## 2019-11-26 PROCEDURE — 99204 OFFICE O/P NEW MOD 45 MIN: CPT | Mod: S$GLB,TXP,, | Performed by: PHYSICIAN ASSISTANT

## 2019-11-26 RX ORDER — POTASSIUM CHLORIDE 20 MEQ/1
TABLET, EXTENDED RELEASE ORAL
Refills: 0 | COMMUNITY
Start: 2019-11-05 | End: 2019-12-12 | Stop reason: ALTCHOICE

## 2019-11-26 NOTE — PROGRESS NOTES
Subjective:      Patient ID: Brunilda Delgadillo is a 54 y.o. female.    Chief Complaint:No chief complaint on file.      History of Present Illness    {ID HPI BLOCKS:98769}    Review of Systems   Constitution: Positive for malaise/fatigue. Negative for chills, decreased appetite, fever, night sweats, weight gain and weight loss.   HENT: Negative for congestion, ear pain, hearing loss, hoarse voice, sore throat and tinnitus.    Eyes: Negative for blurred vision, redness and visual disturbance.   Cardiovascular: Negative for chest pain, leg swelling and palpitations.   Respiratory: Negative for cough, hemoptysis, shortness of breath and sputum production.    Hematologic/Lymphatic: Negative for adenopathy. Bruises/bleeds easily.   Skin: Positive for dry skin and rash. Negative for itching and suspicious lesions.   Musculoskeletal: Positive for myalgias. Negative for back pain, joint pain and neck pain.   Gastrointestinal: Negative for abdominal pain, constipation, diarrhea, heartburn, nausea and vomiting.   Genitourinary: Positive for frequency and urgency. Negative for dysuria, flank pain, hematuria and hesitancy.   Neurological: Positive for numbness. Negative for dizziness, headaches, paresthesias and weakness.   Psychiatric/Behavioral: Negative for depression and memory loss. The patient does not have insomnia and is not nervous/anxious.      Objective:   Physical Exam  Assessment:       No diagnosis found.      Plan:       ***

## 2019-11-26 NOTE — LETTER
November 26, 2019      Soraida Serna MD  [address]  [facility] [address]           [facility] [facility]  [address]  [facility] [address]  Phone: [phone]  Fax: [phone]          Patient: Brunilda Delgadillo   MR Number: [id number]   YOB: 1965   Date of Visit: 11/26/2019       Dear Dr. Soraida Serna:    Thank you for referring Brunilda Delgadillo to me for evaluation. Attached you will find relevant portions of my assessment and plan of care.    If you have questions, please do not hesitate to call me. I look forward to following Brunilda Delgadillo along with you.    Sincerely,    ADRIAN Keys  CC:  No Recipients    If you would like to receive this communication electronically, please contact [email or web address] or [phone] to request more information on [facility] Link access.    For providers and/or their staff who would like to refer a patient to [facility], please contact us through our one-stop-shop provider referral line, [facility], at [phone].    If you feel you have received this communication in error or would no longer like to receive these types of communications, please e-mail [email or web address]

## 2019-11-26 NOTE — PROGRESS NOTES
messaged for RTC with MELD labs & PEth in 1/2020. Order placed for repeat EGD. Order for referral to Add Psych placed with eval orders on 10/29.

## 2019-11-26 NOTE — PROGRESS NOTES
Pre Transplant Infectious Diseases Consult  Liver Transplant Recipient Evaluation    Requesting Physician:Dr. Serna    Reason for Visit:  Pre Transplant Evaluation    Organ:  Liver    Etiology of Liver Disease:  Cirrhosis    History of Prior Transplant:  No    Currently taking immunosuppressants/steroids:  No    History of Splenectomy:  No    Infectious History:  Current/recent infections or currently taking antibiotics?  Yes - Doxycycline for arm cellulits concerns  History of recurrent infections (sinuses, throat, bladder/kidneys, intestines, skin, dental, lung, catheter (HD/PD) related, or peritonitis/SBP)?  No, no hx of sbp  Any major hospitalizations due to infection?  No  If diabetic, history of diabetic foot infection/osteomyelitis?  No  History of shingles?  No  History of STDs (syphilis, viral hepatitis, HIV)?  No  Exposure to TB or ever had a positive TB skin test?  No  History of residence in coccidioides endemic areas ([facility]?  No  Any foreign travel?  No  Any associated illness?  No    Social/Environmental:  Occupational:  Accounting   Animal exposures (dogs, cats, farm animals, bird cages, fish tanks):  Yes - fully immunized dogs  Hobbies (gardening, hike, fish/hunting, etc): grocery  Consumption of raw/undercooked meat or seafood?  Yes Steak  Any injectable or smoked recreational drug use?  No    Immunization History:  Childhood vaccines:  Yes  Last Flu shot: Y  Tetanus/TDAP:Y  Hepatitis A:N  Hepatitis B:N  Prevnar-13:N  Pneumovax-23:N  Shingles (Zostavax/Shingrix):N  Meningococcal:N  Other:     Serologies:  No results found for: CMVIGGINTERP, HEPAIGG, HEPBCAB, HEPBSAB, HEPBSAG, HEPBCAB, PRL31OGIJ, TBGOLDPLUS, RPR, STRONGANTIGG, TOXOIGG, TOXOG, VARICELLAINT     Review of Systems   Constitution: Positive for malaise/fatigue. Negative for chills, decreased appetite, diaphoresis, fever, night sweats, weight gain and weight loss.   HENT: Negative for congestion, ear pain, hearing loss, sore  throat, stridor and tinnitus.    Eyes: Negative for blurred vision, double vision and redness.   Cardiovascular: Negative for chest pain, leg swelling and palpitations.   Respiratory: Negative for cough, hemoptysis, shortness of breath, sputum production and wheezing.    Hematologic/Lymphatic: Negative for adenopathy. Bruises/bleeds easily.   Skin: Positive for dry skin and itching. Negative for rash and suspicious lesions.   Musculoskeletal: Positive for myalgias. Negative for arthritis, back pain, joint pain, joint swelling and neck pain.        Burning neuropathic foot pain   Gastrointestinal: Negative for abdominal pain, constipation, diarrhea, heartburn, nausea and vomiting.   Genitourinary: Positive for frequency and urgency. Negative for bladder incontinence, dysuria, flank pain, hesitancy and incomplete emptying.   Neurological: Negative for dizziness, focal weakness, headaches, loss of balance, numbness and weakness.   Psychiatric/Behavioral: Negative for depression and memory loss. The patient does not have insomnia and is not nervous/anxious.      Physical Exam   Constitutional: She is oriented to person, place, and time. She appears well-developed and well-nourished. No distress.   HENT:   Head: Normocephalic and atraumatic.   Mouth/Throat: Uvula is midline, oropharynx is clear and moist and mucous membranes are normal. No oral lesions.   Torus palantinus   Eyes: Pupils are equal, round, and reactive to light. Conjunctivae and EOM are normal. No scleral icterus.   Neck: Normal range of motion.   Cardiovascular: Normal rate, regular rhythm and normal heart sounds. Exam reveals no gallop and no friction rub.   No murmur heard.  Pulmonary/Chest: Effort normal and breath sounds normal. No respiratory distress. She has no wheezes. She has no rales.   Abdominal: Soft. Bowel sounds are normal. She exhibits distension. She exhibits no mass. There is no hepatosplenomegaly. There is no tenderness. There is no  rebound and no guarding.   Musculoskeletal: She exhibits no edema.   Feet:   Right Foot:   Skin Integrity: Positive for dry skin. Negative for ulcer, blister or warmth.   Left Foot:   Skin Integrity: Positive for dry skin. Negative for ulcer, blister or warmth.   Lymphadenopathy:        Head (right side): No submental, no submandibular, no tonsillar, no preauricular, no posterior auricular and no occipital adenopathy present.        Head (left side): No submental, no submandibular, no tonsillar, no preauricular, no posterior auricular and no occipital adenopathy present.     She has no cervical adenopathy.     She has no axillary adenopathy.        Right: No inguinal, no supraclavicular and no epitrochlear adenopathy present.        Left: No inguinal, no supraclavicular and no epitrochlear adenopathy present.   Neurological: She is alert and oriented to person, place, and time.   Skin: Skin is warm, dry and intact. No rash noted.   Arm no longer with erythema  Bilateral leg slight erythema, not warm or swollen   Psychiatric: She has a normal mood and affect.            Counseling:   I discussed with the patient the risk for increased susceptibility to infections following transplantation including increased risk for infection right after transplant and if rejection should occur.  The patient has been counseled on the importance of vaccinations including but not limited to a yearly flu vaccine. Patient was also instructed to encourage that family/caretakers receive their flu vaccine yearly. The patient was encouraged to contact us about any problems that may develop after immunizations and possible side effects were reviewed.     Specific guidance has been provided to the patient regarding the patient's occupation, hobbies and activities to avoid future infectious complications. These include but are not limited to: avoiding raw/undercooked meats and seafood, avoiding unpasteurized milk/cheeses, proper (hand)  hygiene, contact with animals and appropriate vaccination of animals, use of mosquito/tick precautions, avoiding walking barefoot, avoiding sick contacts, and seeking medical advice prior to foreign travel (specifically developing countries).     Transplant Candidacy: Based on available information, there are no identified significant barriers to transplantation from an infectious disease standpoint pending acceptable serologies and subject to recommendations below.     Final determination of transplant candidacy will be made once evaluation is complete and reviewed by the Transplant Selection Committee.    Labs ordered but pt yet to have drawn prior to visit.  She was to go to lab after visit.  Quantiferon gold, HIV, strongyloides IgG and RPR pending. If positive, please refer to ID clinic.      ID recommendations:    Labs pending  Continue topical antifungal for feet per podiatry.    Pt requested not getting immunization today as she wanted to get them done after Thanksgiving.  Prescriptions given  - Prevnar 13 today followed by Pneumovax in 8 weeks  - Hepatitis A vaccine today and in 6 months  - Hepatitis B vaccine (Heplisav) today and in one month  - Shingrix (two doses at 0 and 2-6 months)

## 2019-12-02 ENCOUNTER — TELEPHONE (OUTPATIENT)
Dept: TRANSPLANT | Facility: CLINIC | Age: 54
End: 2019-12-02

## 2019-12-02 NOTE — TELEPHONE ENCOUNTER
----- Message from Trupti Schroeder sent at 12/2/2019  9:47 AM CST -----  Regarding: FW: RTC    Called pt to aurelia her appt, but there was no answer. LVM for her to call back. Also sent message to addiction for an appt for her liver txp work up    ----- Message -----  From: Rickey Ortega RN  Sent: 11/26/2019   2:22 PM CST  To: Trupti Schroeder  Subject: RTC                                              RTC in January 2020 with MELD labs & PEth.

## 2019-12-03 ENCOUNTER — TELEPHONE (OUTPATIENT)
Dept: TRANSPLANT | Facility: CLINIC | Age: 54
End: 2019-12-03

## 2019-12-03 NOTE — TELEPHONE ENCOUNTER
Patient's phone call returned.  Patient reports that she received a phone call to schedule EGD but states that this was already done at  w/ Dr. Garcia in 2016.  She states that she was told that since she did not have any varices, no need to repeat at this time.  Informed her that if evaluated for liver transplant, per protocol, EGD is repeated every two years, but that this will be confirmed w/ her MD if repeat recommended.  She verbalized understanding and reports that if EGD needs to be repeated, she wants it done at [facility].  Will send above to MD for review/ advice and notify patient of recommendations once available.  She denies any questions or the need for any further assistance at this time.    ==============================================================    ----- Message from Esperanza Guzman MA sent at 12/3/2019  3:56 PM CST -----  Contact: pt      ----- Message -----  From: Deepak Dill  Sent: 12/3/2019   3:49 PM CST  To: Daphne Quigley Staff    Pt was returing missed call from [facility]     Call back: [phone]

## 2019-12-04 ENCOUNTER — TELEPHONE (OUTPATIENT)
Dept: TRANSPLANT | Facility: CLINIC | Age: 54
End: 2019-12-04

## 2019-12-04 NOTE — TELEPHONE ENCOUNTER
----- Message from Trupti Schroeder sent at 12/4/2019  9:08 AM CST -----    Rec'tangela call from pt to [facility] her f/u appt w/Dr. Serna. Pt [facility]'ed for 1/13. Also went over appt date/time for add psych for 3/5/2020. Will mail out appt info

## 2019-12-05 NOTE — TELEPHONE ENCOUNTER
Called patient to inform her of MD recommendation to repeat EGD now.  No answer at this time.  Message left on VM requesting a callback to discuss recommendations.  Contact info provided.  Awaiting callback.

## 2019-12-08 LAB
ALBUMIN SERPL-MCNC: 4.9 G/DL (ref 3.5–5.5)
ALBUMIN/GLOB SERPL: 1.5 {RATIO} (ref 1.2–2.2)
ALP SERPL-CCNC: 189 IU/L (ref 39–117)
ALT SERPL-CCNC: 32 IU/L (ref 0–32)
AST SERPL-CCNC: 29 IU/L (ref 0–40)
BILIRUB SERPL-MCNC: 0.8 MG/DL (ref 0–1.2)
BUN SERPL-MCNC: 18 MG/DL (ref 6–24)
BUN/CREAT SERPL: 19 (ref 9–23)
CALCIUM SERPL-MCNC: 10.2 MG/DL (ref 8.7–10.2)
CHLORIDE SERPL-SCNC: 89 MMOL/L (ref 96–106)
CO2 SERPL-SCNC: 25 MMOL/L (ref 20–29)
CREAT SERPL-MCNC: 0.94 MG/DL (ref 0.57–1)
GLOBULIN SER CALC-MCNC: 3.3 G/DL (ref 1.5–4.5)
GLUCOSE SERPL-MCNC: 114 MG/DL (ref 65–99)
POTASSIUM SERPL-SCNC: 3.3 MMOL/L (ref 3.5–5.2)
PROT SERPL-MCNC: 8.2 G/DL (ref 6–8.5)
SODIUM SERPL-SCNC: 135 MMOL/L (ref 134–144)

## 2019-12-09 DIAGNOSIS — Z76.82 ORGAN TRANSPLANT CANDIDATE: ICD-10-CM

## 2019-12-09 DIAGNOSIS — K70.31 ALCOHOLIC CIRRHOSIS OF LIVER WITH ASCITES: Primary | ICD-10-CM

## 2019-12-09 DIAGNOSIS — K70.30 ALCOHOLIC CIRRHOSIS, UNSPECIFIED WHETHER ASCITES PRESENT: ICD-10-CM

## 2019-12-10 ENCOUNTER — TELEPHONE (OUTPATIENT)
Dept: TRANSPLANT | Facility: CLINIC | Age: 54
End: 2019-12-10

## 2019-12-10 DIAGNOSIS — K70.30 ALCOHOLIC CIRRHOSIS, UNSPECIFIED WHETHER ASCITES PRESENT: ICD-10-CM

## 2019-12-10 DIAGNOSIS — Z76.82 ORGAN TRANSPLANT CANDIDATE: ICD-10-CM

## 2019-12-10 LAB — PHOSPHATIDYLETHANOL (PETH): 38 NG/ML

## 2019-12-10 NOTE — TELEPHONE ENCOUNTER
----- Message from Trupti Schroeder sent at 12/10/2019 11:32 AM CST -----    Called pt to let her know that that psych has aurelia'ed her for 12/18, but there was no answer. LVM for her to call back

## 2019-12-10 NOTE — TELEPHONE ENCOUNTER
----- Message from Harika Boone sent at 12/10/2019 10:38 AM CST -----  Contact: pt   Pt is calling to speak with Kaushik about questions regarding her evaluation on Thursday and Friday this week. Pt said she had a test that was scheduled for today that she didn't know about. Can you please call pt at [phone]    MALINI

## 2019-12-10 NOTE — TELEPHONE ENCOUNTER
----- Message from Trupti Schroeder sent at 12/10/2019  3:00 PM CST -----  Contact: pt: [phone]    Returned call to, but there was no answer. [facility] for her to call back.    ----- Message -----  From: Sherie Mcdermott MA  Sent: 12/10/2019   1:00 PM CST  To: Trupti Schroeder        ----- Message -----  From: Reece Hernandez  Sent: 12/10/2019  12:53 PM CST  To: [facility] Pre-Liver Transplant Non-Clinical    Pt returning call from [email or web address]     Please contact pt: [phone]

## 2019-12-10 NOTE — TELEPHONE ENCOUNTER
----- Message from Trupti Schroeder sent at 12/10/2019  4:16 PM CST -----    Returned call to pt and confirmed appts [facility]'ed for 12/18

## 2019-12-10 NOTE — TELEPHONE ENCOUNTER
Returned call to pt. She received a call today (likely from pre-cert dept) regarding an EGD test. Explained to pt that she should return that call, they are likely trying to discuss her upcoming EGD with her since Dr. Serna wants her to repeat the procedure. Also, reviewed [facility] appts & procedures for her evaluation. Reminded pt to bring her caregiver. Understanding expressed. No other questions at this time.

## 2019-12-12 ENCOUNTER — INITIAL CONSULT (OUTPATIENT)
Dept: TRANSPLANT | Facility: CLINIC | Age: 54
End: 2019-12-12
Payer: COMMERCIAL

## 2019-12-12 ENCOUNTER — CLINICAL SUPPORT (OUTPATIENT)
Dept: TRANSPLANT | Facility: CLINIC | Age: 54
End: 2019-12-12
Payer: COMMERCIAL

## 2019-12-12 ENCOUNTER — HOSPITAL ENCOUNTER (OUTPATIENT)
Dept: RADIOLOGY | Facility: HOSPITAL | Age: 54
Discharge: HOME | End: 2019-12-12
Attending: INTERNAL MEDICINE
Payer: COMMERCIAL

## 2019-12-12 ENCOUNTER — HOSPITAL ENCOUNTER (OUTPATIENT)
Dept: RADIOLOGY | Facility: CLINIC | Age: 54
Discharge: HOME | End: 2019-12-12
Attending: INTERNAL MEDICINE
Payer: COMMERCIAL

## 2019-12-12 ENCOUNTER — TELEPHONE (OUTPATIENT)
Dept: TRANSPLANT | Facility: CLINIC | Age: 54
End: 2019-12-12

## 2019-12-12 VITALS
DIASTOLIC BLOOD PRESSURE: 95 MMHG | TEMPERATURE: 98 F | BODY MASS INDEX: 24.73 KG/M2 | HEART RATE: 91 BPM | RESPIRATION RATE: 16 BRPM | WEIGHT: 139.56 LBS | SYSTOLIC BLOOD PRESSURE: 130 MMHG | RESPIRATION RATE: 16 BRPM | WEIGHT: 139.56 LBS | HEART RATE: 91 BPM | BODY MASS INDEX: 24.73 KG/M2 | SYSTOLIC BLOOD PRESSURE: 130 MMHG | OXYGEN SATURATION: 99 % | DIASTOLIC BLOOD PRESSURE: 95 MMHG | HEIGHT: 63 IN | OXYGEN SATURATION: 99 % | HEIGHT: 63 IN | TEMPERATURE: 98 F

## 2019-12-12 VITALS
HEIGHT: 63 IN | RESPIRATION RATE: 16 BRPM | OXYGEN SATURATION: 99 % | DIASTOLIC BLOOD PRESSURE: 95 MMHG | WEIGHT: 139.56 LBS | TEMPERATURE: 98 F | HEART RATE: 91 BPM | SYSTOLIC BLOOD PRESSURE: 130 MMHG | BODY MASS INDEX: 24.73 KG/M2

## 2019-12-12 DIAGNOSIS — Z79.4 TYPE 2 DIABETES MELLITUS WITH OTHER SPECIFIED COMPLICATION, WITH LONG-TERM CURRENT USE OF INSULIN: ICD-10-CM

## 2019-12-12 DIAGNOSIS — K70.31 ALCOHOLIC CIRRHOSIS OF LIVER WITH ASCITES: ICD-10-CM

## 2019-12-12 DIAGNOSIS — Z01.818 PRE-TRANSPLANT EVALUATION FOR LIVER TRANSPLANT: ICD-10-CM

## 2019-12-12 DIAGNOSIS — K70.31 ALCOHOLIC CIRRHOSIS OF LIVER WITH ASCITES: Primary | ICD-10-CM

## 2019-12-12 DIAGNOSIS — R18.8 OTHER ASCITES: ICD-10-CM

## 2019-12-12 DIAGNOSIS — G62.9 NEUROPATHY: ICD-10-CM

## 2019-12-12 DIAGNOSIS — E11.69 TYPE 2 DIABETES MELLITUS WITH OTHER SPECIFIED COMPLICATION, WITH LONG-TERM CURRENT USE OF INSULIN: ICD-10-CM

## 2019-12-12 DIAGNOSIS — Z76.82 ORGAN TRANSPLANT CANDIDATE: ICD-10-CM

## 2019-12-12 DIAGNOSIS — L03.90 CELLULITIS, UNSPECIFIED CELLULITIS SITE: ICD-10-CM

## 2019-12-12 DIAGNOSIS — Z76.82 ORGAN TRANSPLANT CANDIDATE: Primary | ICD-10-CM

## 2019-12-12 DIAGNOSIS — E87.6 HYPOKALEMIA: ICD-10-CM

## 2019-12-12 LAB
AMPHET+METHAMPHET UR QL: NEGATIVE
BACTERIA #/AREA URNS AUTO: ABNORMAL /HPF
BARBITURATES UR QL SCN>200 NG/ML: NEGATIVE
BENZODIAZ UR QL SCN>200 NG/ML: NEGATIVE
BILIRUB UR QL STRIP: NEGATIVE
BZE UR QL SCN: NEGATIVE
CANNABINOIDS UR QL SCN: NEGATIVE
CLARITY UR REFRACT.AUTO: ABNORMAL
COLOR UR AUTO: YELLOW
CREAT UR-MCNC: 78 MG/DL (ref 15–325)
ETHANOL UR-MCNC: <10 MG/DL
GLUCOSE UR QL STRIP: NEGATIVE
HGB UR QL STRIP: NEGATIVE
HYALINE CASTS UR QL AUTO: 4 /LPF
KETONES UR QL STRIP: NEGATIVE
LEUKOCYTE ESTERASE UR QL STRIP: ABNORMAL
METHADONE UR QL SCN>300 NG/ML: NEGATIVE
MICROSCOPIC COMMENT: ABNORMAL
NITRITE UR QL STRIP: NEGATIVE
OPIATES UR QL SCN: NEGATIVE
PCP UR QL SCN>25 NG/ML: NEGATIVE
PH UR STRIP: 7 [PH] (ref 5–8)
PROT UR QL STRIP: NEGATIVE
RBC #/AREA URNS AUTO: 6 /HPF (ref 0–4)
SP GR UR STRIP: 1.01 (ref 1–1.03)
SQUAMOUS #/AREA URNS AUTO: 16 /HPF
TOXICOLOGY INFORMATION: NORMAL
URN SPEC COLLECT METH UR: ABNORMAL
WBC #/AREA URNS AUTO: 7 /HPF (ref 0–5)

## 2019-12-12 PROCEDURE — 93975 VASCULAR STUDY: CPT | Mod: 26,TXP,, | Performed by: RADIOLOGY

## 2019-12-12 PROCEDURE — 81001 URINALYSIS AUTO W/SCOPE: CPT | Mod: TXP,59

## 2019-12-12 PROCEDURE — 99999 PR PBB SHADOW E&M-EST. PATIENT-LVL III: CPT | Mod: PBBFAC,TXP,,

## 2019-12-12 PROCEDURE — 99499 UNLISTED E&M SERVICE: CPT | Mod: S$GLB,TXP,, | Performed by: INTERNAL MEDICINE

## 2019-12-12 PROCEDURE — 99999 PR PBB SHADOW E&M-EST. PATIENT-LVL III: ICD-10-PCS | Mod: PBBFAC,TXP,,

## 2019-12-12 PROCEDURE — 77080 DXA BONE DENSITY AXIAL: CPT | Mod: TC,TXP

## 2019-12-12 PROCEDURE — 71046 XR CHEST PA AND LATERAL: ICD-10-PCS | Mod: 26,TXP,, | Performed by: RADIOLOGY

## 2019-12-12 PROCEDURE — 93975 US ABDOMEN COMP WITH DOPPLER_PRE LIVER TRANSPLANT: ICD-10-PCS | Mod: 26,TXP,, | Performed by: RADIOLOGY

## 2019-12-12 PROCEDURE — 71046 X-RAY EXAM CHEST 2 VIEWS: CPT | Mod: 26,TXP,, | Performed by: RADIOLOGY

## 2019-12-12 PROCEDURE — 99244 PR OFFICE CONSULTATION,LEVEL IV: ICD-10-PCS | Mod: S$GLB,TXP,, | Performed by: SURGERY

## 2019-12-12 PROCEDURE — 97802 MEDICAL NUTRITION INDIV IN: CPT | Mod: S$GLB,TXP,, | Performed by: DIETITIAN, REGISTERED

## 2019-12-12 PROCEDURE — 99244 OFF/OP CNSLTJ NEW/EST MOD 40: CPT | Mod: S$GLB,TXP,, | Performed by: SURGERY

## 2019-12-12 PROCEDURE — 77080 DEXA BONE DENSITY SPINE HIP: ICD-10-PCS | Mod: 26,TXP,, | Performed by: INTERNAL MEDICINE

## 2019-12-12 PROCEDURE — 77080 DXA BONE DENSITY AXIAL: CPT | Mod: 26,TXP,, | Performed by: INTERNAL MEDICINE

## 2019-12-12 PROCEDURE — 76700 US EXAM ABDOM COMPLETE: CPT | Mod: 26,TXP,, | Performed by: RADIOLOGY

## 2019-12-12 PROCEDURE — 93975 VASCULAR STUDY: CPT | Mod: TC,TXP

## 2019-12-12 PROCEDURE — 97802 PR MED NUTR THER, 1ST, INDIV, EA 15 MIN: ICD-10-PCS | Mod: S$GLB,TXP,, | Performed by: DIETITIAN, REGISTERED

## 2019-12-12 PROCEDURE — 76700 US ABDOMEN COMP WITH DOPPLER_PRE LIVER TRANSPLANT: ICD-10-PCS | Mod: 26,TXP,, | Performed by: RADIOLOGY

## 2019-12-12 PROCEDURE — 80307 DRUG TEST PRSMV CHEM ANLYZR: CPT | Mod: TXP

## 2019-12-12 PROCEDURE — 71046 X-RAY EXAM CHEST 2 VIEWS: CPT | Mod: TC,FY,TXP

## 2019-12-12 PROCEDURE — 99499 NO LOS: ICD-10-PCS | Mod: S$GLB,TXP,, | Performed by: INTERNAL MEDICINE

## 2019-12-12 RX ORDER — SPIRONOLACTONE 100 MG/1
200 TABLET, FILM COATED ORAL DAILY
Qty: 30 TABLET | Refills: 11 | Status: SHIPPED | OUTPATIENT
Start: 2019-12-12 | End: 2020-08-31 | Stop reason: SDUPTHER

## 2019-12-12 RX ORDER — FUROSEMIDE 40 MG/1
20 TABLET ORAL DAILY
Qty: 15 TABLET | Refills: 11 | Status: SHIPPED | OUTPATIENT
Start: 2019-12-12 | End: 2021-05-26 | Stop reason: SDUPTHER

## 2019-12-12 NOTE — TELEPHONE ENCOUNTER
----- Message from Soraida Serna MD sent at 12/12/2019 11:38 AM CST -----  Increase spironolactone to 200 mg daily and decrease lasix to 20 mg daily. Repeat labs in one week - please let patient know.

## 2019-12-12 NOTE — PROGRESS NOTES
"TRANSPLANT NUTRITIONAL ASSESSMENT    Referring Provider: Soraida Serna MD    Reason for Visit: Pre-liver transplant work-up    Age: 54 y.o.  Sex: female    Patient Active Problem List   Diagnosis    Alcoholic cirrhosis of liver    Ascites    Type 2 diabetes mellitus    History of cellulitis    Hyponatremia    Portal hypertensive gastropathy    Anxiety disorder    Depression     Past Medical History:   Diagnosis Date    Anxiety disorder 8/21/2019    Ascites 8/21/2019    Cirrhosis     Depression 8/21/2019    Diabetes mellitus     History of cellulitis 8/21/2019    Hyponatremia 8/21/2019    Portal hypertensive gastropathy 8/21/2019    On EGD 11/16    Type 2 diabetes mellitus 8/21/2019     Lab Results   Component Value Date     (H) 12/12/2019    K 3.0 (L) 12/12/2019    ALBUMIN 4.5 12/12/2019    HGBA1C 5.8 (H) 11/26/2019    CALCIUM 10.9 (H) 12/12/2019     Other Pertinent Labs: none    Current Outpatient Medications   Medication Sig    DULoxetine (CYMBALTA) 60 MG capsule Take 60 mg by mouth once daily.     furosemide (LASIX) 40 MG tablet Take 1 tablet (40 mg total) by mouth once daily.    gabapentin (NEURONTIN) 300 MG capsule Take 1 capsule (300 mg total) by mouth every evening.    insulin detemir (LEVEMIR U-100 INSULIN SUBQ) Inject 40 Units into the skin once daily.    metOLazone (ZAROXOLYN) 2.5 MG tablet Take every other day 30 min prior to other diuretics    multivit with minerals/lutein (MULTIVITAMIN 50 PLUS ORAL) Take by mouth.    pen needle, diabetic 30 gauge x 1/3" Ndle NovoFine 30 30 gauge x 1/3" needle    spironolactone (ALDACTONE) 100 MG tablet Take 1 tablet (100 mg total) by mouth once daily.    thiamine (VITAMIN B-1) 100 MG tablet Take 100 mg by mouth.     No current facility-administered medications for this visit.      Allergies: Penicillins    Ht Readings from Last 1 Encounters:   12/12/19 5' 3.23" (1.606 m)     Wt Readings from Last 1 Encounters:   12/12/19 63.3 kg (139 " lb 8.8 oz)      BMI: Body mass index is 24.54 kg/m².    Usual Weight: 140-145 lb  Weight Change/Time: no significant change recently  Current Diet: regular  Appetite/Current Intake: fair   Exercise/Physical Activity: functional in ADLs, limited by diabetic neuropathy, states generally has little feeling in bottom of feet  Nutritional/Herbal Supplements: multi vit, vit B  Potential Food/Medication Interactions: reviewed  Chewing/Swallowing Problems: none  Symptoms: none  Assessment of Lab Values: Glu 124, K 3.0, Ca 10.9, Ha1c 5.8  Support System: mother and significant other present, voice support in diet/nutrition, s/o cooks meals and grocery shops    Estimated Kcal Need: 1187-7356 kcal (25-30 kcal/kg)  Estimated Protein Need: 63-94 gm (1-1.5 gm/kg)    Nutritional History:   Pt reports fair/good appetite though she is very particular with what foods she wants to eat, her 'cravings' vary from day to day. She will eat a food consistently for a while them get tired of it. Caregiver is willing to cook what pt wants to eat if she communicates that with him. Pt eats pickles often with various other foods. Pt skips breakfast or has a yogurt. Pt may have milk, lemon & hot sauce, maybe 1 meatball or some meat loaf, maybe fish or shrimp. Pt has fast food maybe 4 x/week. She has had Atkins protein shakes in the past, likes them but has not been drinking them lately. She drinks water, unsweetened tea and milk throughout they day.     Nutritional Diagnoses  Problem: food- and nutrition-related knowledge deficit  Etiology: r/t no prior edu on low sodium diet rec's and protein intake rec's  Symptoms: aeb diet recall, questions from pt    Educational Need? yes  Barriers: none identified  Discussed with: patient and caregiver  Interventions: Patient taught nutrition information regarding Pre-liver transplant work-up.    Reviewed Low Sodium packet (low Na diet, foods recommended/not recommended, food label strategies, flavoring tips,  & sample menu).   Provided education on protein content in foods, goal intake per day, suggestions for ways to reach protein intake goal; nutrition supplements, snacks.   Goals/Recommendations: diet adherence and small frequent meals and snacks  Actions Taken: instruct/provide written information  Strategies Used: problem solving, goal setting, motivational interviewing  Patient and/or family comprehend instructions: yes , adherence expected  Outcome: Verbalizes understanding  Monitoring:     Contact information provided, will f/u in clinic and communicate with the care team as needed.     Counseling Time: 15 minutes

## 2019-12-12 NOTE — PROGRESS NOTES
Patient seen in clinic with caregiver.  Consents reviewed and signatures obtained.   Patient given supplies needed to obtain urine specimen.    Patient's name and date of birth verified.   Instructions reviewed with the patient on the way the specimen should be obtained.   Patient stated that she understood the information provided for the collection and  placement of the specimen. Specimen collected in clinic  Patient given supplies and printed instructions for the collection of the 24 hour urine specimen.  Patient reports understanding the instructions provided to obtain the specimen and the storage of the specimen while at home.  Patient given instructed to bring the container to 2nd floor lab when the collection is completed.  Patient contact information verified.  Patient appointments reviewed along with special instructions and locations.  Patient questions answered and concerns addressed.

## 2019-12-12 NOTE — PROGRESS NOTES
Transplant Surgery Liver Transplant Recipient Evaluation    Referring Physician: Silviano Clark  Corresponding Physician: Silviano Clark    Subjective:     Reason for Visit: evaluate liver transplant candidacy    History of Present Illness: Brunilda Delgadillo is a 54 y.o. year old female who is being evaluated for liver transplantation.    Transplant History: N/A    Native Liver Disease (UNOS terminology): Alcoholic Cirrhosis (based on medical records from referral).    Manifestations of liver disease: ascites (recurrent paracentesis needed), edema, encephalopathy and fatigue  MELD-Na score: 11 at 12/12/2019  8:10 AM  MELD score: 11 at 12/12/2019  8:10 AM  Calculated from:  Serum Creatinine: 1.1 mg/dL at 12/12/2019  8:10 AM  Serum Sodium: 137 mmol/L at 12/12/2019  8:10 AM  Total Bilirubin: 1.7 mg/dL at 12/12/2019  8:10 AM  INR(ratio): 1.2 at 12/12/2019  8:10 AM  Age: 54 years    PMH: reviewed  PSH: reviewed    Review of Systems   Constitutional: Positive for fatigue.   HENT: Negative for drooling, postnasal drip and sore throat.    Eyes: Negative for discharge and itching.   Respiratory: Negative for choking and stridor.    Gastrointestinal: Negative for rectal pain.   Endocrine: Negative for polydipsia.   Genitourinary: Negative for enuresis and genital sores.   Musculoskeletal: Negative for back pain, neck pain and neck stiffness.   Allergic/Immunologic: Negative for immunocompromised state.   Neurological: Negative for facial asymmetry and numbness.   Hematological: Negative for adenopathy.   Psychiatric/Behavioral: Negative for behavioral problems, self-injury and suicidal ideas.         Objective:     Physical Exam:  Constitutional:   Vitals reviewed: yes   Well-nourished and well-groomed: yes  Eyes:   Sclerae icteric: no   Extraocular movements intact: yes  GI:    Bowel sounds normal: yes   Tenderness: no    If yes, quadrant/location: not applicable   Palpable masses: no    If yes, quadrant/location: not  applicable   Hepatosplenomegaly: no   Ascites: yes   Hernia: yes. Location: umbilical    If yes, type/location: umbilical hernia, repaired   Surgical scars: yes    If yes, type/location: Pfannenstiel  abdominoplasty  Resp:   Effort normal:    Breath sounds normal: yes    CV:   Regular rate and rhythm: yes   Heart sounds normal: yes   Femoral pulses normal: yes   Extremities edematous: no  Skin:   Rashes or lesions present: no    If yes, describe:not applicable   Jaundice:: no    Musculoskeletal:   Gait normal: yes   Strength normal: yes  Psych:   Oriented to person, place, and time: yes   Affect and mood normal: yes    Additional comments: not applicable         Transplant Surgery - Candidacy   Assessment/Plan:   I see no surgical contraindication to liver transplantation. Based on available information, Brunilda Delgadillo is a suitable liver transplant candidate. Final determination of transplant candidacy will be made once evaluation is complete and reviewed by the Liver Transplant Selection Committee.    Juan Damon MD       Counseling: We provided Brunilda Delgadillo with a group education session today.  We discussed liver transplantation at length with her, including risks, potential complications, and alternatives in the management of her liver disease.  The discussion included complications related to anesthesia, bleeding, infection, primary nonfunction, and vascular thrombosis.  I discussed the typical postoperative course, length of hospitalization, the need for long-term immunosuppression, and the need for long-term routine follow-up.  I discussed living-donor and -donor transplantation and the relative advantages and disadvantages of each.  I also discussed average waiting times for both living donation and  donation.  I discussed national and center-specific survival rates.  I also mentioned the potential benefit of multicenter listing to candidates listed with centers within more than  one organ procurement organization.  All questions were answered.    PHS: I discussed the use of organs from donors with PHS increased-risk behavior, including the testing protocols utilized, as well as data from the literature regarding the likelihood of transmission of hepatitis or HIV.  The patient that she is willing to consider such grafts.  DCD: I discussed the use of organs recovered by donation after cardiac death (DCD), including slightly decreased graft survival and greater risk of arterial and biliary complications. The potential advantage to the recipient is possibly receiving a transplant sooner by accepting such an organ. The patient that she is willing to consider such grafts.  HBcAb: I discussed the use of organs from donors with HBcAb in conjunction with long term use of HBV antiviral drugs, such as lamivudine. The small but measurable risk of hepatitis B seroconversion was discussed as well as the potentially life long need to continue antiviral drugs. The patient that she is willing to consider such grafts.  HCV Non-viremic recipient: I discussed the use of HCV-positive organs in naive recipients, including the risk of viral transmission to the patients or others, potential insurance barriers for antiviral medication coverage, risk for fibrosing cholestatic hepatitis, death or graft loss. The potential advantage to the recipient is the possibility of receiving a transplant sooner with decreased mortality risk by accepting such an organ. The patient that she is willing to consider such grafts.  LDLT: I discussed the nature of living donor liver transplant, including donor risks and more frequent recipient complications. The patient that she is willing to consider such grafts.

## 2019-12-12 NOTE — PROGRESS NOTES
Transplant Recipient Adult Psychosocial Assessment-patient (pt) present pt's , Enrique Delgadillo and and pt's mother, Nehal Delgadillo  [address] [facility] [address]  Telephone Information:   Mobile [phone]   Home  [phone] (home)  Work  There is no work phone number on file.  E-mail  [email or web address]    Sex: female  YOB: 1965  Age: 54 y.o.    Encounter Date: 12/12/2019  [place] Citizen: yes  Primary Language: English   Needed: no    Emergency Contact:  Name: Enrique Delgadillo  Relationship:   Address: same as patient  Phone Numbers:  [phone] (home), [phone] (mobile)    Family/Social Support:   Number of dependents/: The pt has two minor children:  Scott (17) and Nehal (14) who depend upon pt for care.  Should the pt be transplanted, the pt's grandparents will provide care for the children.   Marital history: The pt has been  three times with two divorces.  The pt is currently  and has been for 24 years.   Other family dynamics: The pt's parents are both alive, Nehal (78) and George (80), who reside in [place].  The pt has one brother, George (56), who resides in [place].  The pt has one small dog, Bentley, within the home.  Pt's mother stated during the assessment, the pt's family has a history of alcoholics within.       Household Composition:  The pt resides within the home with pt's  and children.     Do you and your caregivers have access to reliable transportation? yes  PRIMARY CAREGIVER: Enrique Delgadillo  will be primary caregiver, phone number [phone].      provided in-depth information to patient and caregiver regarding pre- and post-transplant caregiver role.   strongly encourages patient and caregiver to have concrete plan regarding post-transplant care giving, including back-up caregiver(s) to ensure care giving needs are met as needed.    Patient and Caregiver states  understanding all aspects of caregiver role/commitment and is able/willing/committed to being caregiver to the fullest extent necessary.    Patient and Caregiver verbalizes understanding of the education provided today and caregiver responsibilities.         remains available. Patient and Caregiver agree to contact  in a timely manner if concerns arise.      Able to take time off work without financial concerns: yes.  Pt's  will utilize vacation time to assist in the pt's care.     Additional Significant Others who will Assist with Transplant:  Name: Nehal Dunne ([phone])  Age: 78  City: [place] State: [place]  Relationship: mother  Does person drive? yes    Name: George Dunne Sr. ([phone])   Age: 80  City: [place]: [place]  Relationship: father  Does person drive? yes    Living Will: no  Healthcare Power of : no  Advance Directives on file: <<no information> per medical record.  Verbally reviewed LW/HCPA information.   provided patient with copy of LW/HCPA documents and provided education on completion of forms.    Living Donors: N/A    Highest Education Level: High School (9-12) or GED; 12th grade  Reading Ability: 12th grade  Reports difficulty with: N/A  Learns Best By:  Combination of written, verbal and demonstration      Status: no  VA Benefits: no     Working for Income: No  If no, reason not working: Demands of Treatment  Patient is unemployed and was last employed with [employer] as an .   Pt last worked in 2014.    Spouse/Significant Other Employment: Pt's  is a District Chief with the [place] [employer].      Disabled: no    Monthly Income:  Salary/Wages: $0/Pt's 's monthly income $7000  Able to afford all costs now and if transplanted, including medications: yes  Patient and Caregiver verbalizes understanding of personal responsibilities related to transplant costs  and the importance of having a financial plan to ensure that patients transplant costs are fully covered.       provided fundraising information/education. Patient and Caregiververbalizes understanding.   remains available.    Insurance:   Payor/Plan Subscr  Sex Relation Sub. Ins. ID Effective Group Num   1. [facility]* BAO IRIZARRY 1965 Female Self [id number]                                    [address] [address]   2. [facility]* NINA IRIZARRY 1969 Male  [id number]                                   [address]     Primary Insurance (for UNOS reporting): Private Insurance-United  Secondary Insurance (for UNOS reporting): None  Patient and Caregiver verbalizes clear understanding that patient may experience difficulty obtaining and/or be denied insurance coverage post-surgery. This includes and is not limited to disability insurance, life insurance, health insurance, burial insurance, long term care insurance, and other insurances.      Patient and Caregiver also reports understanding that future health concerns related to or unrelated to transplantation may not be covered by patient's insurance.  Resources and information provided and reviewed.     Patient and Caregiver provides verbal permission to release any necessary information to outside resources for patient care and discharge planning.  Resources and information provided are reviewed.      Dialysis Adherence: Patient reports pt has never received dialysis.      Infusion Service: patient utilizing? no  Home Health: patient utilizing? no  DME: no  Pulmonary/Cardiac Rehab: no   ADLS:  Pt reports being solely independent and does currently drive.     Adherence:   Pt reports positive adherence with all medical appointments/instructions as well as with daily medication regimens.  Adherence education and counseling provided.     Per History Section:  Past Medical History:   Diagnosis Date     "Anxiety disorder 8/21/2019    Ascites 8/21/2019    Cirrhosis     Depression 8/21/2019    Diabetes mellitus     History of cellulitis 8/21/2019    Hyponatremia 8/21/2019    Portal hypertensive gastropathy 8/21/2019    On EGD 11/16    Type 2 diabetes mellitus 8/21/2019     Social History     Tobacco Use    Smoking status: Never Smoker    Smokeless tobacco: Never Used   Substance Use Topics    Alcohol use: Not Currently     Social History     Substance and Sexual Activity   Drug Use Not Currently     Social History     Substance and Sexual Activity   Sexual Activity Not on file       Per Today's Psychosocial:  Tobacco: none, patient denies any use.  Alcohol: none, patient denies any use.  Pt reported last use of ETOH as four months ago.  Pt would consume three to four glasses of wine per day for  "numerous years....a few".  Pt denies utilization of any other alcohol use outside of wine.  Pt received liver diagnosis in 2018, was advised by medical entities to discontinue all ETOH use and continued to utilize ETOH.   Illicit Drugs/Non-prescribed Medications: none, patient denies any use.    Patient and Caregiver states clear understanding of the potential impact of substance use as it relates to transplant candidacy and is aware of possible random substance screening.  Substance abstinence/cessation counseling, education and resources provided and reviewed.     Arrests/DWI/Treatment/Rehab: patient denies    Psychiatric History:    Mental Health: depression and anxiety (postpartum depression 2002 and 2005 as related to [place])  Psychiatrist/Counselor: Pt received treatment with Dr. Black, Adult Psychiatrist for approximately three months which began in 2011.  No current treatment  Medications:  Cymbalta 60mg prescribed by Dr. Michele, PCP. Pt reports positive management of symptoms with medication   Suicide/Homicide Issues: Pt denies any SI/HI (past/present)   Safety at home: Pt denies any safety " issues within the home (past/present)    Knowledge: Patient and Caregiver states having clear understanding and realistic expectations regarding the potential risks and potential benefits of organ transplantation and organ donation and agrees to discuss with health care team members and support system members, as well as to utilize available resources and express questions and/or concerns in order to further facilitate the pt informed decision-making.  Resources and information provided and reviewed.    Patient and Caregiver is aware of [facility]'s affiliation and/or partnership with agencies in home health care, LTAC, SNF, [facility], and other hospitals and clinics.    Understanding: Patient and Caregiver reports having a clear understanding of the many lifetime commitments involved with being a transplant recipient, including costs, compliance, medications, lab work, procedures, appointments, concrete and financial planning, preparedness, timely and appropriate communication of concerns, abstinence (ETOH, tobacco, illicit non-prescribed drugs), adherence to all health care team recommendations, support system and caregiver involvement, appropriate and timely resource utilization and follow-through, mental health counseling as needed/recommended, and patient and caregiver responsibilities.  Social Service Handbook, resources and detailed educational information provided and reviewed.  Educational information provided.    Patient and Caregiver also reports current and expected compliance with health care regime and states having a clear understanding of the importance of compliance.      Patient and Caregiver reports a clear understanding that risks and benefits may be involved with organ transplantation and with organ donation.       Patient and Caregiver also reports clear understanding that psychosocial risk factors may affect patient, and include but are not limited to feelings of depression, generalized anxiety,  anxiety regarding dependence on others, post traumatic stress disorder, feelings of guilt and other emotional and/or mental concerns, and/or exacerbation of existing mental health concerns.  Detailed resources provided and discussed.      Patient and Caregiver agrees to access appropriate resources in a timely manner as needed and/or as recommended, and to communicate concerns appropriately.  Patient and Caregiver also reports a clear understanding of treatment options available.     Patient and Caregiver received education in a group setting.   reviewed education, provided additional information, and answered questions.    Feelings or Concerns: Pt endorsed worry; however, is receptive of receiving a transplant. Pt reports concerns regarding the actual surgical and recovery process as well as possible rejection.     Coping: Identify Patient & Caregiver Strategies to [place]:   1. Currently & Pre-transplant - Pt utilizes effective communication with pt's mother and busy with high school children.    2. At the time of surgery - Pt will watch television and utilize family relationships as coping mechanisms.    3. During post-Transplant & Recovery Period - Pt will also utilize family relationships to cope during the recovery period.     Goals: Pt would like to have more energy and get back to normal life with shopping and taking care of self.  Patient referred to Vocational Rehabilitation.    Interview Behavior: Patient and Caregiver presents as alert and oriented x 4, pleasant, good eye contact, well groomed, recall good, concentration/judgement good, average intelligence, calm, communicative and cooperative.          Transplant Social Work - Candidacy  Assessment/Plan:     Psychosocial Suitability: Patient presents as an acceptable candidate for liver transplant at this time. Based on psychosocial risk factors, patient presents as high risk, due to an extensive ETOH history with receipt of no formal alcohol  abuse treatment, failed adherence to discontinue all ETOH use upon receiving instructions to do so by medical team with regards to liver disease and mental health history (depression/anxiety). Protective factors:  Adequate medical insurance and finances; verified caregiver plan; supportive family; and managed mental health symptoms (currently).     Recommendations/Additional Comments:   -Enroll into an IOP, pre transplant, of choice with execution of a Consent to Release Information for [facility] in an attempt to verify enrollment and to receive regular treatment upates;  -Attend 12 step meetings, twice weekly, and fax attendance sheets to the SW team bi weekly (fax number provided to pt);  -Contact the SW team should mental health symptoms reoccur to receive appropriate community resources to seek treatment accordingly;  -Serial Peth test  -Fundraising    Kallie Jason LCSW

## 2019-12-12 NOTE — PROGRESS NOTES
Pt was seen in ID clinic on 11/26/19 by Enrique Mancera PA-C.  Does not need to be seen again.   Ordered vaccines.

## 2019-12-12 NOTE — PROGRESS NOTES
Pre Transplant Infectious Diseases Consult  Liver Transplant Recipient Evaluation    Requesting Physician: Dr. Serna    Reason for Visit:  Pre Transplant Evaluation    Organ:  Liver    Etiology of Liver Disease:  Alcoholic cirrhosis    History of Prior Transplant:  No    Currently taking immunosuppressants/steroids:  No    History of Splenectomy:  No    Infectious History:  Current/recent infections or currently taking antibiotics?  {YES **/No:11542}  History of recurrent infections (sinuses, throat, bladder/kidneys, intestines, skin, dental, lung, catheter (HD/PD) related, or peritonitis/SBP)?  {YES NO:54278}  Any major hospitalizations due to infection?  {YES **/No:47381}  If diabetic, history of diabetic foot infection/osteomyelitis?  {YES NO:37345}  History of shingles?  {YES NO:12160}  History of STDs (syphilis, viral hepatitis, HIV)?  {YES **/No:30451}  Exposure to TB or ever had a positive TB skin test?  {YesWhenTreatmentNo:79323}  History of residence in coccidioides endemic areas ([facility]?  {YES NO:00098}  Any foreign travel?  {YES **/No:41236}  Any associated illness?  {YES NO:23179}    Social/Environmental:  Occupational:  ***  Animal exposures (dogs, cats, farm animals, bird cages, fish tanks):  {YES **/No:84335}  Hobbies (gardening, hike, fish/hunting, etc): ***  Consumption of raw/undercooked meat or seafood?  {YES NO:35349}  Any injectable or smoked recreational drug use?  {YES NO:41586}    Immunization History:  Childhood vaccines:  Yes  Last Flu shot:   Tetanus/TDAP: 7/2019  Hepatitis A:  Hepatitis B:  Prevnar-13:  Pneumovax-23:  Shingles (Zostavax/Shingrix):  Meningococcal:  Other:     Serologies:  CMV IgG Interpretation   Date Value Ref Range Status   11/26/2019 Non-Reactive Non-Reactive Final     Hepatitis A Antibody IgG   Date Value Ref Range Status   11/26/2019 Negative  Final     Hep B Core Total Ab   Date Value Ref Range Status   11/26/2019 Negative  Final     Hep B S Ab   Date Value  Ref Range Status   11/26/2019 Negative  Final     Hepatitis B Surface Ag   Date Value Ref Range Status   11/26/2019 Negative Negative Final     HIV 1/2 Ag/Ab   Date Value Ref Range Status   11/26/2019 Negative Negative Final     TB Gold Plus   Date Value Ref Range Status   11/26/2019 Negative  Final     Comment:     The Nil tube value is used to determine if the patient has a  preexisting immune response which could cause a false-positive  reading on the test.  In order for a test to be valid, the   NIL tube must have a value of less than or equal to 8.0 IU/mL.  The mitogen control tube is used to assure the patient has a   healthy immune status and also serves as a control for correct  blood handling and incubation.  It is used to detect false-  negative readings.  The mitogen tube must have a gamma   interferon value of greater than or equal to 0.5 IU/mL higher  that the value of the Nil tube.  The TB antigen tubes are coated with the M. tuberculosis   specific antigens. For a test to be considered positive,the  TB antigen tube values minus the Nil tube value must be   greater than or equal to 0.35 IU/mL.  Diagnosing or excluding tuberculosis disease, and assessing  the probability of LTNI, requires a combination of   epidemiological, historical, medical, and diagnostic findings  that should be taken into account when interpreting   Quantiferon-TB Gold Plus results.       RPR   Date Value Ref Range Status   11/26/2019 Non-reactive Non-reactive Final     Strongyloides Ab IgG   Date Value Ref Range Status   11/26/2019 Negative Negative Final     Comment:     No detectable levels of IgG antibodies to Strongyloides.  Repeat testing in 1-2 weeks if clinically indicated.  Test Performed by:  [facility]  [address] [place] [address]  : Ramon Bradford M.D. Ph.D.; CLIA# [id number]       Varicella Interpretation   Date Value Ref Range Status   11/26/2019  Negative Negative Final     Comment:     <or=0.8    Negative        No detectable IgG antibody to Varicella zoster  by the JILL test. Such individuals are presumed to be   uninfected with Varicella zoster and to be susceptible to   primary infection.  0.9-1.0    Equivocal  >or=1.1    Positive        Indicates presence of detectable IgG antibody to Varicella   zoster by the JILL test. Indicative of previous or current   infection.          ROS  Physical Exam         Counseling:   I discussed with the patient the risk for increased susceptibility to infections following transplantation including increased risk for infection right after transplant and if rejection should occur.  The patient has been counseled on the importance of vaccinations including but not limited to a yearly flu vaccine. Patient was also instructed to encourage that family/caretakers receive their flu vaccine yearly. The patient was encouraged to contact us about any problems that may develop after immunizations and possible side effects were reviewed.     Specific guidance has been provided to the patient regarding the patient's occupation, hobbies and activities to avoid future infectious complications. These include but are not limited to: avoiding raw/undercooked meats and seafood, avoiding unpasteurized milk/cheeses, proper (hand) hygiene, contact with animals and appropriate vaccination of animals, use of mosquito/tick precautions, avoiding walking barefoot, avoiding sick contacts, and seeking medical advice prior to foreign travel (specifically developing countries).     Transplant Candidacy: Based on available information, there are no identified significant barriers to transplantation from an infectious disease standpoint pending acceptable serologies and subject to recommendations below.     Final determination of transplant candidacy will be made once evaluation is complete and reviewed by the Transplant Selection Committee.      ID  recommendations:

## 2019-12-12 NOTE — TELEPHONE ENCOUNTER
Attempted to call patient's phone to adjust diuretics. Phone was out of service. Prescriptions routed to hepatologist to sign & then to pt's preferred pharmacy.    Reached pt on , Enrique', phone. Explained dosage changes to pt & had her write them down/repeat back. Also explained to pt that her PEth test still shows alcohol usage. Pt denies any use at all. Reviewed other potential sources of alcohol: mouthwash, cold medicines, nonalcoholic beer, certain flavor extracts, cooking with alcohol, etc. Pt denies any of these. Encouraged continued sobriety/avoidance of these items. Understanding expressed. No other questions at this time.

## 2019-12-12 NOTE — PROGRESS NOTES
PRE EDUCATION TEACHING NOTE    Brunilda Delgadillo was seen in clinic today.  Handbook on pre-liver transplant information (see outline below) was given to the patient and time was allowed for questions.  Patient's  and mother, Enrique and Yamila accompanied her.  Informed consent signed and written information given on selection criteria.    LIVER TRANSPLANT WORK-UP EDUCATION   I. UNDERSTANDING THE TRANSPLANT PROCESS     A. Transplant team      B. MELD score      C. Balancing urgency and outcome     D. Liver Transplant Options         1.  Donor         2. Living Donor--rationale, benefits     E. Transplant Work-up         1. Medical         2. Psychological and Social--lifetime commitment, life changes, personal plan/ goal         3. Financial--fundraising     F.  Completed work-up and Next Steps    G. Wait Time         1.  Can be listed at more than one center         2.  Can transfer wait time     H. The Call       I. Possible donor options         1. DCD         2. Hep B Core and Hep C Positive         3. Increased Risk     J.  Liver Transplant Surgery         1. Length         2. Transfusions, cell saver         3. Surgical risks         4. What to expect after sugery--Central lines, drains, Walls catheter, incision, endotracheal              tube, NG tube, length of stay in ICU/ TSU  II.  HOW TO BEST CARE FOR YOURSELF (Take Five To Thrive)  III. UNDERSTANDING LIFE AFTER TRANSPLANT  A. Medicines after transplant      1. Immunosuppression--infection and rejection  B. Labs   IV. ADULT LIVER SURVIVAL RATES

## 2019-12-12 NOTE — PROGRESS NOTES
"PHARM.D. PRE-TRANSPLANT NOTE:    This patient's medication therapy was evaluated as part of her pre-transplant evaluation.      The following general pharmacologic concerns were noted: none    The following pharmacologic concerns related to HCV therapy were noted: none      This patient's medication profile was reviewed for considerations for DAA Hepatitis C therapy:    [x]  No current inducers of CYP 3A4 or PGP  [x]  No amiodarone on this patient's EMR profile in the last 24 months  [x]  No past or current atrial fibrillation on this patient's EMR profile       Current Outpatient Medications   Medication Sig Dispense Refill    DULoxetine (CYMBALTA) 60 MG capsule Take 60 mg by mouth once daily.       furosemide (LASIX) 40 MG tablet Take 1 tablet (40 mg total) by mouth once daily. 30 tablet 11    gabapentin (NEURONTIN) 300 MG capsule Take 1 capsule (300 mg total) by mouth every evening. 30 capsule 11    insulin detemir (LEVEMIR U-100 INSULIN SUBQ) Inject 40 Units into the skin once daily.      metOLazone (ZAROXOLYN) 2.5 MG tablet Take every other day 30 min prior to other diuretics 30 tablet 11    multivit with minerals/lutein (MULTIVITAMIN 50 PLUS ORAL) Take by mouth.      pen needle, diabetic 30 gauge x 1/3" Ndle NovoFine 30 30 gauge x 1/3" needle      spironolactone (ALDACTONE) 100 MG tablet Take 1 tablet (100 mg total) by mouth once daily. 30 tablet 11    thiamine (VITAMIN B-1) 100 MG tablet Take 100 mg by mouth.       No current facility-administered medications for this visit.          Currently Ms Delgadillo is responsible for preparing / administering this patient's medications on a daily basis.  I am available for consultation and can be contacted, as needed by the other members of the Liver Transplant team.   "

## 2019-12-13 ENCOUNTER — CLINICAL SUPPORT (OUTPATIENT)
Dept: INFECTIOUS DISEASES | Facility: CLINIC | Age: 54
End: 2019-12-13
Payer: COMMERCIAL

## 2019-12-13 ENCOUNTER — HOSPITAL ENCOUNTER (OUTPATIENT)
Dept: RADIOLOGY | Facility: HOSPITAL | Age: 54
Discharge: HOME | End: 2019-12-13
Attending: INTERNAL MEDICINE
Payer: COMMERCIAL

## 2019-12-13 ENCOUNTER — HOSPITAL ENCOUNTER (OUTPATIENT)
Dept: CARDIOLOGY | Facility: CLINIC | Age: 54
Discharge: HOME | End: 2019-12-13
Attending: INTERNAL MEDICINE
Payer: COMMERCIAL

## 2019-12-13 ENCOUNTER — HOSPITAL ENCOUNTER (OUTPATIENT)
Dept: RADIOLOGY | Facility: HOSPITAL | Age: 54
Discharge: HOME | End: 2019-12-13
Payer: COMMERCIAL

## 2019-12-13 VITALS
DIASTOLIC BLOOD PRESSURE: 70 MMHG | HEIGHT: 63 IN | WEIGHT: 139 LBS | HEART RATE: 88 BPM | RESPIRATION RATE: 16 BRPM | BODY MASS INDEX: 24.63 KG/M2 | SYSTOLIC BLOOD PRESSURE: 112 MMHG

## 2019-12-13 DIAGNOSIS — K70.31 ALCOHOLIC CIRRHOSIS OF LIVER WITH ASCITES: ICD-10-CM

## 2019-12-13 DIAGNOSIS — Z76.82 ORGAN TRANSPLANT CANDIDATE: ICD-10-CM

## 2019-12-13 LAB
ASCENDING AORTA: 2.93 CM
AV INDEX (PROSTH): 0.72
AV MEAN GRADIENT: 5 MMHG
AV PEAK GRADIENT: 8 MMHG
AV VALVE AREA: 2.25 CM2
AV VELOCITY RATIO: 0.73
BSA FOR ECHO PROCEDURE: 1.67 M2
CV ECHO LV RWT: 0.42 CM
CV STRESS BASE HR: 89 BPM
DIASTOLIC BLOOD PRESSURE: 80 MMHG
DOP CALC AO PEAK VEL: 1.41 M/S
DOP CALC AO VTI: 22.45 CM
DOP CALC LVOT AREA: 3.1 CM2
DOP CALC LVOT DIAMETER: 1.99 CM
DOP CALC LVOT PEAK VEL: 1.03 M/S
DOP CALC LVOT STROKE VOLUME: 50.58 CM3
DOP CALCLVOT PEAK VEL VTI: 16.27 CM
E WAVE DECELERATION TIME: 197.31 MSEC
E/A RATIO: 0.61
E/E' RATIO: 5.29 M/S
ECHO LV POSTERIOR WALL: 0.82 CM (ref 0.6–1.1)
FRACTIONAL SHORTENING: 32 % (ref 28–44)
INTERVENTRICULAR SEPTUM: 0.81 CM (ref 0.6–1.1)
IVRT: 0.12 MSEC
LA MAJOR: 4.61 CM
LA MINOR: 4.65 CM
LA WIDTH: 3.11 CM
LEFT ATRIUM SIZE: 3.21 CM
LEFT ATRIUM VOLUME INDEX: 23.7 ML/M2
LEFT ATRIUM VOLUME: 39.29 CM3
LEFT INTERNAL DIMENSION IN SYSTOLE: 2.67 CM (ref 2.1–4)
LEFT VENTRICLE DIASTOLIC VOLUME INDEX: 40.39 ML/M2
LEFT VENTRICLE DIASTOLIC VOLUME: 66.91 ML
LEFT VENTRICLE MASS INDEX: 56 G/M2
LEFT VENTRICLE SYSTOLIC VOLUME INDEX: 15.9 ML/M2
LEFT VENTRICLE SYSTOLIC VOLUME: 26.33 ML
LEFT VENTRICULAR INTERNAL DIMENSION IN DIASTOLE: 3.92 CM (ref 3.5–6)
LEFT VENTRICULAR MASS: 92.72 G
LV LATERAL E/E' RATIO: 4.09 M/S
LV SEPTAL E/E' RATIO: 7.5 M/S
MV PEAK A VEL: 0.74 M/S
MV PEAK E VEL: 0.45 M/S
OHS CV CPX 1 MINUTE RECOVERY HEART RATE: 134 BPM
OHS CV CPX 85 PERCENT MAX PREDICTED HEART RATE MALE: 135
OHS CV CPX MAX PREDICTED HEART RATE: 158
OHS CV CPX PATIENT IS FEMALE: 1
OHS CV CPX PATIENT IS MALE: 0
OHS CV CPX PEAK DIASTOLIC BLOOD PRESSURE: 43 MMHG
OHS CV CPX PEAK HEAR RATE: 137 BPM
OHS CV CPX PEAK RATE PRESSURE PRODUCT: NORMAL
OHS CV CPX PEAK SYSTOLIC BLOOD PRESSURE: 108 MMHG
OHS CV CPX PERCENT MAX PREDICTED HEART RATE ACHIEVED: 86
OHS CV CPX RATE PRESSURE PRODUCT PRESENTING: NORMAL
PISA TR MAX VEL: 2.29 M/S
PULM VEIN S/D RATIO: 2.41
PV PEAK D VEL: 0.29 M/S
PV PEAK S VEL: 0.7 M/S
RA MAJOR: 4.4 CM
RA PRESSURE: 3 MMHG
RA WIDTH: 2.57 CM
RIGHT VENTRICULAR END-DIASTOLIC DIMENSION: 2.84 CM
RV TISSUE DOPPLER FREE WALL SYSTOLIC VELOCITY 1 (APICAL 4 CHAMBER VIEW): 11.72 CM/S
SINUS: 3.03 CM
STJ: 2.58 CM
SYSTOLIC BLOOD PRESSURE: 122 MMHG
TDI LATERAL: 0.11 M/S
TDI SEPTAL: 0.06 M/S
TDI: 0.09 M/S
TR MAX PG: 21 MMHG
TRICUSPID ANNULAR PLANE SYSTOLIC EXCURSION: 2.09 CM
TV REST PULMONARY ARTERY PRESSURE: 24 MMHG

## 2019-12-13 PROCEDURE — 93351 STRESS TTE COMPLETE: CPT | Mod: S$GLB,TXP,, | Performed by: INTERNAL MEDICINE

## 2019-12-13 PROCEDURE — 90739 HEPB VACC 2/4 DOSE ADULT IM: CPT | Mod: S$GLB,TXP,, | Performed by: INTERNAL MEDICINE

## 2019-12-13 PROCEDURE — 90471 HEPATITIS B (RECOMBINANT) ADJUVANTED, 2 DOSE: ICD-10-PCS | Mod: S$GLB,TXP,, | Performed by: INTERNAL MEDICINE

## 2019-12-13 PROCEDURE — 90739 HEPATITIS B (RECOMBINANT) ADJUVANTED, 2 DOSE: ICD-10-PCS | Mod: S$GLB,TXP,, | Performed by: INTERNAL MEDICINE

## 2019-12-13 PROCEDURE — 90472 PNEUMOCOCCAL CONJUGATE VACCINE 13-VALENT LESS THAN 5YO & GREATER THAN: ICD-10-PCS | Mod: S$GLB,TXP,, | Performed by: INTERNAL MEDICINE

## 2019-12-13 PROCEDURE — 93351 STRESS ECHO (CUPID ONLY): ICD-10-PCS | Mod: S$GLB,TXP,, | Performed by: INTERNAL MEDICINE

## 2019-12-13 PROCEDURE — 90471 IMMUNIZATION ADMIN: CPT | Mod: S$GLB,TXP,, | Performed by: INTERNAL MEDICINE

## 2019-12-13 PROCEDURE — 74160 CT ABDOMEN W/CONTRAST: CPT | Mod: 26,TXP,, | Performed by: RADIOLOGY

## 2019-12-13 PROCEDURE — 93325 STRESS ECHO (CUPID ONLY): ICD-10-PCS | Mod: S$GLB,TXP,, | Performed by: INTERNAL MEDICINE

## 2019-12-13 PROCEDURE — 25500020 PHARM REV CODE 255: Mod: TXP | Performed by: INTERNAL MEDICINE

## 2019-12-13 PROCEDURE — 93325 DOPPLER ECHO COLOR FLOW MAPG: CPT | Mod: S$GLB,TXP,, | Performed by: INTERNAL MEDICINE

## 2019-12-13 PROCEDURE — 90632 HEPA VACCINE ADULT IM: CPT | Mod: S$GLB,TXP,, | Performed by: INTERNAL MEDICINE

## 2019-12-13 PROCEDURE — 90472 IMMUNIZATION ADMIN EACH ADD: CPT | Mod: S$GLB,TXP,, | Performed by: INTERNAL MEDICINE

## 2019-12-13 PROCEDURE — 77067 SCR MAMMO BI INCL CAD: CPT | Mod: TC,TXP

## 2019-12-13 PROCEDURE — 77067 SCR MAMMO BI INCL CAD: CPT | Mod: 26,TXP,, | Performed by: RADIOLOGY

## 2019-12-13 PROCEDURE — 96372 PR INJECTION,THERAP/PROPH/DIAG2ST, IM OR SUBCUT: ICD-10-PCS | Mod: S$GLB,TXP,, | Performed by: INTERNAL MEDICINE

## 2019-12-13 PROCEDURE — 96372 THER/PROPH/DIAG INJ SC/IM: CPT | Mod: S$GLB,TXP,, | Performed by: INTERNAL MEDICINE

## 2019-12-13 PROCEDURE — 74160 CT ABDOMEN W/CONTRAST: CPT | Mod: TC,TXP

## 2019-12-13 PROCEDURE — 90670 PCV13 VACCINE IM: CPT | Mod: S$GLB,TXP,, | Performed by: INTERNAL MEDICINE

## 2019-12-13 PROCEDURE — 77067 MAMMO DIGITAL SCREENING BILAT WITH CAD: ICD-10-PCS | Mod: 26,TXP,, | Performed by: RADIOLOGY

## 2019-12-13 PROCEDURE — 90632 HEPATITIS A VACCINE ADULT IM: ICD-10-PCS | Mod: S$GLB,TXP,, | Performed by: INTERNAL MEDICINE

## 2019-12-13 PROCEDURE — 90670 PNEUMOCOCCAL CONJUGATE VACCINE 13-VALENT LESS THAN 5YO & GREATER THAN: ICD-10-PCS | Mod: S$GLB,TXP,, | Performed by: INTERNAL MEDICINE

## 2019-12-13 PROCEDURE — 93320 DOPPLER ECHO COMPLETE: CPT | Mod: S$GLB,TXP,, | Performed by: INTERNAL MEDICINE

## 2019-12-13 PROCEDURE — 93320 STRESS ECHO (CUPID ONLY): ICD-10-PCS | Mod: S$GLB,TXP,, | Performed by: INTERNAL MEDICINE

## 2019-12-13 PROCEDURE — 74160 CT ABDOMEN WITH CONTRAST: ICD-10-PCS | Mod: 26,TXP,, | Performed by: RADIOLOGY

## 2019-12-13 RX ORDER — ATROPINE SULFATE 0.1 MG/ML
1 INJECTION INTRAVENOUS
Status: COMPLETED | OUTPATIENT
Start: 2019-12-13 | End: 2019-12-13

## 2019-12-13 RX ORDER — DOBUTAMINE HYDROCHLORIDE 200 MG/100ML
10 INJECTION INTRAVENOUS
Status: COMPLETED | OUTPATIENT
Start: 2019-12-13 | End: 2019-12-13

## 2019-12-13 RX ADMIN — IOHEXOL 75 ML: 350 INJECTION, SOLUTION INTRAVENOUS at 12:12

## 2019-12-13 RX ADMIN — DOBUTAMINE HYDROCHLORIDE 10 MCG/KG/MIN: 200 INJECTION INTRAVENOUS at 09:12

## 2019-12-13 RX ADMIN — ATROPINE SULFATE 1 MG: 0.1 INJECTION INTRAVENOUS at 09:12

## 2019-12-15 LAB
ALBUMIN SERPL-MCNC: 4.9 G/DL (ref 3.5–5.5)
ALBUMIN/GLOB SERPL: 1.6 {RATIO} (ref 1.2–2.2)
ALP SERPL-CCNC: 185 IU/L (ref 39–117)
ALT SERPL-CCNC: 36 IU/L (ref 0–32)
AST SERPL-CCNC: 39 IU/L (ref 0–40)
BILIRUB SERPL-MCNC: 1.1 MG/DL (ref 0–1.2)
BUN SERPL-MCNC: 17 MG/DL (ref 6–24)
BUN/CREAT SERPL: 18 (ref 9–23)
CALCIUM SERPL-MCNC: 10.1 MG/DL (ref 8.7–10.2)
CHLORIDE SERPL-SCNC: 93 MMOL/L (ref 96–106)
CO2 SERPL-SCNC: 23 MMOL/L (ref 20–29)
CREAT SERPL-MCNC: 0.94 MG/DL (ref 0.57–1)
GLOBULIN SER CALC-MCNC: 3.1 G/DL (ref 1.5–4.5)
GLUCOSE SERPL-MCNC: 153 MG/DL (ref 65–99)
POTASSIUM SERPL-SCNC: 3.9 MMOL/L (ref 3.5–5.2)
PROT SERPL-MCNC: 8 G/DL (ref 6–8.5)
SODIUM SERPL-SCNC: 137 MMOL/L (ref 134–144)

## 2019-12-16 ENCOUNTER — TELEPHONE (OUTPATIENT)
Dept: TRANSPLANT | Facility: CLINIC | Age: 54
End: 2019-12-16

## 2019-12-16 DIAGNOSIS — Z76.82 ORGAN TRANSPLANT CANDIDATE: ICD-10-CM

## 2019-12-16 DIAGNOSIS — K70.31 ALCOHOLIC CIRRHOSIS OF LIVER WITH ASCITES: Primary | ICD-10-CM

## 2019-12-16 NOTE — TELEPHONE ENCOUNTER
Notified by  that pt had called with questions. Returned call to patient. She inquired as to whether her allergy med has ETOH in it (referring to the previous phone conversation where we discussed not using any alcohol-containing products). Informed patient that if she is questioning wheter her OTC allergy meds have ETOH, she should check the label with her local pharmacist. If still unsure, recommended she choose another allergy med. Pt also asked about energy drinks, stool softener & a few other products. Recommended that the patient be diligent about checking all food and medication labels.     Discussed with patient that she needs to repeat her 24-hr urine collection. She stated that she kept the bottle in an ice chest with ice and noticed that the bottle had leaked some urine into the ice chest due to the cap not being tightened properly. Will mail a new collection container to the patient. Requested that the patient call to schedule her drop off appt on the 2nd floor int he lab PRIOR to beginning her collection. Understanding expressed. No other questions at this time.

## 2019-12-16 NOTE — LETTER
December 16, 2019    Brunilda Delgadillo  [address] [facility] [address]            Dear Brunilda Delgadillo:  MRN: [id number]    In addition to your Initial Liver Transplant appointment that has been scheduled, additional testing must be done to determine if you are in suitable health to undergo a liver transplant. All of the recommended studies must be completed and received by the transplant team before you can be presented to the transplant selection committee. The committee will then decide if you are a suitable transplant candidate. You need to have request that the following studies be obtained at home locally:       ______ Mammography ICD-10 code Z12.31    ______ Gynecologic exam. The need for a pap smear will be determined by gynecologist.     ______ Endoscopy: To screen for esophageal varices if has not been done in 2 years.     ______ Colonoscopy:  This is a recommended screening test for all adults over the age of 50, or those                      considered at risk for colon cancer. ICD-10 code Z12.11    ______ Bone density scan: To screen for bone density loss in patients at risk Z78.9    ___X___ 24 hour urine for creatinine clearance and protein: To check your kidney function     You and your doctor should feel free to contact us at any time, if there are questions or concerns about these tests or the transplant evaluation process.      Please use the enclosed collection container to collect your urine for 24 hours. You will need to call your transplant coordinator before you start collecting your urine to set up an appointment in the 2nd floor main lab to drop off your specimen and get your blood drawn for a metabolic panel to complete you urine study. Please call [facility] at [phone] to let her know which day your would like to complete your testing.    Sincerely,      Soraida Serna M.D., PhD.    [facility]  [facility] [place] [address] [phone]

## 2019-12-18 ENCOUNTER — OFFICE VISIT (OUTPATIENT)
Dept: PSYCHIATRY | Facility: CLINIC | Age: 54
End: 2019-12-18
Payer: COMMERCIAL

## 2019-12-18 VITALS
BODY MASS INDEX: 25.82 KG/M2 | HEART RATE: 98 BPM | DIASTOLIC BLOOD PRESSURE: 77 MMHG | WEIGHT: 145.75 LBS | HEIGHT: 63 IN | SYSTOLIC BLOOD PRESSURE: 139 MMHG

## 2019-12-18 DIAGNOSIS — Z78.9 ALCOHOL USE: Primary | ICD-10-CM

## 2019-12-18 PROCEDURE — 99999 PR PBB SHADOW E&M-EST. PATIENT-LVL II: ICD-10-PCS | Mod: PBBFAC,TXP,, | Performed by: PSYCHIATRY & NEUROLOGY

## 2019-12-18 PROCEDURE — 99999 PR PBB SHADOW E&M-EST. PATIENT-LVL II: CPT | Mod: PBBFAC,TXP,, | Performed by: PSYCHIATRY & NEUROLOGY

## 2019-12-18 PROCEDURE — 90792 PR PSYCHIATRIC DIAGNOSTIC EVALUATION W/MEDICAL SERVICES: ICD-10-PCS | Mod: S$GLB,TXP,, | Performed by: PSYCHIATRY & NEUROLOGY

## 2019-12-18 PROCEDURE — 90792 PSYCH DIAG EVAL W/MED SRVCS: CPT | Mod: S$GLB,TXP,, | Performed by: PSYCHIATRY & NEUROLOGY

## 2019-12-18 NOTE — PROGRESS NOTES
"PSYCHIATRIC EVALUATION REGARDING POSSIBLE LIVER TRANSPLANT    ENCOUNTER DATE: 12/18/2019  SITE: [facility]  REFFERAL SOURCE: Transplant service  Met with: patient.    HISTORY    CHIEF COMPLAINT   Brunilda Delgadillo is a 54 y.o. female who presents to the clinic for a psychiatric evaluation regarding possible liver transplant.    HPI    From transplant  note from 12/12/19:  "Per Today's Psychosocial:  Tobacco: none, patient denies any use.  Alcohol: none, patient denies any use.  Pt reported last use of ETOH as four months ago.  Pt would consume three to four glasses of wine per day for  "numerous years....a few".  Pt denies utilization of any other alcohol use outside of wine.  Pt received liver diagnosis in 2018, was advised by medical entities to discontinue all ETOH use and continued to utilize ETOH.   Illicit Drugs/Non-prescribed Medications: none, patient denies any use.   Patient and Caregiver states clear understanding of the potential impact of substance use as it relates to transplant candidacy and is aware of possible random substance screening.  Substance abstinence/cessation counseling, education and resources provided and reviewed.   Arrests/DWI/Treatment/Rehab: patient denies  Psychiatric History:    Mental Health: depression and anxiety (postpartum depression 2002 and 2005 as related to [place])  Psychiatrist/Counselor: Pt received treatment with Dr. Black, Adult Psychiatrist for approximately three months which began in 2011.  No current treatment  Medications:  Cymbalta 60mg prescribed by Dr. Michele, PCP. Pt reports positive management of symptoms with medication   Suicide/Homicide Issues: Pt denies any SI/HI (past/present)   Safety at home: Pt denies any safety issues within the home (past/present)"    Pt. Today reports:  No alcohol for 4-5 months, stopped drinking on her own. No drug.  Reports coping well psychologically with physical " issues.  Does not plan to begin drinking alcohol again. Does not feel, that she needs rehab/AA.    First began drinking alcohol around age 15. No rehab/detox/AA. Over the years drank alcohol in an attempt to relieve stress.    Psychiatric Review Of Systems - Is patient experiencing or having changes in:  sleep: poor due to burning in feet.  appetite: adequate  weight: no  energy/anergy: no  interest/pleasure/anhedonia: no  somatic symptoms: no  libido: low  anxiety/panic: no  guilty/hopelessness: no  concentration: no  S.I.B.s/risky behavior: no  Irritability: no  Racing thoughts: no  Impulsive behaviors: no  Paranoia:no  AVH:no    PAST PSYCHIATRIC HISTORY  Post partum depression with son  Previous Psychiatric Hospitalizations: no   Previous Suicide Attempts: no   Previous Medication Trials: Zoloft[diarrhea]  History of Violence: no  Depression: yes  Carmina: no  AH's: no  Delusions: no    Medical ROS    General ROS: some fatigue and burning in feet  ENT ROS: negative  Cardiovascular ROS: negative  Respiratory ROS: negative  Gastrointestinal ROS: negative  Neurological ROS: negative  Dermatological ROS: negative  Endocrine ROS: negative    NUTRITIONAL SCREENING   Considering the patient's height and weight, medications, medical history and preferences, should a referral be made to the dietitian? no    PAST MEDICAL HISTORY   Please see chart.  Wish to become pregnant[if female of childbearing age]: LMP around age 51    NEUROLOGIC HISTORY   Seizures: no   Head trauma/Loss of consciousness: no head trauma with l.o.c.    ALLERGIES   Review of patient's allergies indicates:   Allergen Reactions    Penicillins      Rash as a child     MEDICATIONS   Psychotropic Medications   Cymbalta 60 mg daily[has been taking for years]    Scheduled and PRN Medications     Current Outpatient Medications:     DULoxetine (CYMBALTA) 60 MG capsule, Take 60 mg by mouth once daily. , Disp: , Rfl:     furosemide (LASIX) 40 MG tablet, Take 0.5  "tablets (20 mg total) by mouth once daily., Disp: 15 tablet, Rfl: 11    gabapentin (NEURONTIN) 300 MG capsule, Take 1 capsule (300 mg total) by mouth every evening., Disp: 30 capsule, Rfl: 11    insulin detemir (LEVEMIR U-100 INSULIN SUBQ), Inject 40 Units into the skin once daily., Disp: , Rfl:     metOLazone (ZAROXOLYN) 2.5 MG tablet, Take every other day 30 min prior to other diuretics, Disp: 30 tablet, Rfl: 11    multivit with minerals/lutein (MULTIVITAMIN 50 PLUS ORAL), Take by mouth., Disp: , Rfl:     pen needle, diabetic 30 gauge x 1/3" Ndle, NovoFine 30 30 gauge x 1/3" needle, Disp: , Rfl:     spironolactone (ALDACTONE) 100 MG tablet, Take 2 tablets (200 mg total) by mouth once daily., Disp: 30 tablet, Rfl: 11    thiamine (VITAMIN B-1) 100 MG tablet, Take 100 mg by mouth., Disp: , Rfl:     SUBSTANCE ABUSE HISTORY   Tobacco: no  Alcohol: no  Illicit Substances: no, no IVDA    LEGAL HISTORY   Past Charges/Incarcerations: no   Pending Charges: no    FAMILY PSYCHIATRIC HISTORY   Son ADHD  Brother had alcohol problem    SOCIAL HISTORY  History of Physical/Sexual Abuse: no  Education: high school    Employment/Disability: currently not working, was   Financial: some difficulty  Relationship Status/Sexual Orientation:  to 3rd   Children: two, ages 14 and 17   Housing Status: lives with family  Spirituality: believes in God   History: no   Recreational Activities: used to enjoy physical activity  Access to Gun:  has gun    EXAMINATION    VITALS   Wt Readings from Last 3 Encounters:   12/18/19 66.1 kg (145 lb 11.6 oz)   12/13/19 63 kg (139 lb)   12/12/19 63.3 kg (139 lb 8.8 oz)     Temp Readings from Last 3 Encounters:   12/12/19 98.2 °F (36.8 °C) (Oral)   12/12/19 98.2 °F (36.8 °C) (Oral)   12/12/19 98.2 °F (36.8 °C) (Oral)     BP Readings from Last 3 Encounters:   12/18/19 139/77   12/13/19 112/70   12/12/19 (!) 130/95     Pulse Readings from Last 3 " Encounters:   12/18/19 98   12/13/19 88   12/12/19 91       CONSTITUTIONAL  General Appearance: unremarkable, age appropriate    MUSCULOSKELETAL  Muscle Strength and Tone: normal strength and tone  Abnormal Involuntary Movements: none noted  Gait and Station: normal gait and station    PSYCHIATRIC   Level of Consciousness: alert  Orientation: oriented to person, place and time  Grooming: well groomed  Psychomotor Behavior: no agitation  Speech: normal in rate, rhythm and volume  Language: uses words appropriately  Mood: steady  Affect: appropriate  Thought Process: logical, goal directed  Associations: intact  Thought Content: no SI/HI  Memory: grossly intact  Attention: intact for interview  Insight: appears adequate  Judgement: appears adequate    ASSESSMENT     DIAGNOSIS/IMPRESSION   Alcohol Use[pt. Reports being abstinent for 4-5 months]  Depressive d/o, unspecified[taking Cymbalta]  If the pt. Would achieve long term[at least 6 months] abstinence from substance use, she would likely be psychiatrically adequately stable for possible liver transplant. However based on results of available PETH tests it appears unlikely, that the pt. Has been abstinent from alcohol[most recent PETH result 38 on 11/26/19]. An outpatient[or residential] substance use rehab program therefore appears necessary at this time, if the pt. Would like to be considered for possible liver transplant.  This case was discussed with chief of psychiatry Dr. Arnav Perea.    Time with patient: 45 min  More than 50% of the time was spent counseling/coordinating care.  LABORATORY DATA  Orders Only on 12/14/2019   Component Date Value Ref Range Status    Glucose 12/14/2019 153* 65 - 99 mg/dL Final    BUN, Bld 12/14/2019 17  6 - 24 mg/dL Final    Creatinine 12/14/2019 0.94  0.57 - 1.00 mg/dL Final    eGFR if non  12/14/2019 69  >59 mL/min/1.73 Final    eGFR if  12/14/2019 80  >59 mL/min/1.73 Final     BUN/Creatinine Ratio 12/14/2019 18  9 - 23 Final    Sodium 12/14/2019 137  134 - 144 mmol/L Final    Potassium 12/14/2019 3.9  3.5 - 5.2 mmol/L Final    Chloride 12/14/2019 93* 96 - 106 mmol/L Final    CO2 12/14/2019 23  20 - 29 mmol/L Final    Calcium 12/14/2019 10.1  8.7 - 10.2 mg/dL Final    Total Protein 12/14/2019 8.0  6.0 - 8.5 g/dL Final    Albumin 12/14/2019 4.9  3.5 - 5.5 g/dL Final    Globulin, Total 12/14/2019 3.1  1.5 - 4.5 g/dL Final    Albumin/Globulin Ratio 12/14/2019 1.6  1.2 - 2.2 Final    Total Bilirubin 12/14/2019 1.1  0.0 - 1.2 mg/dL Final    Alkaline Phosphatase 12/14/2019 185* 39 - 117 IU/L Final    AST 12/14/2019 39  0 - 40 IU/L Final    ALT 12/14/2019 36* 0 - 32 IU/L Final   Lab Visit on 12/13/2019   Component Date Value Ref Range Status    Urine Volume 12/13/2019 980  mL Final    Urine Collection Duration 12/13/2019 24  Hr Final    Protein, Urine 12/13/2019 <7  0 - 15 mg/dL Final    Urine Protein, Timed 12/13/2019 Unable to calculate  0 - 100 mg/Spec Final    Urine Volume 12/13/2019 980  mL Final    Urine Collection Duration 12/13/2019 24  Hr Final    Creatinine, Urine 12/13/2019 37.0  15.0 - 325.0 mg/dL Final    Creatinine Clearance 12/13/2019 23* 70 - 110 mL/min Final    Creatinine, Ur (mg/spec) 12/13/2019 362.6  mg/Spec Final    Creatinine 12/13/2019 1.1  0.5 - 1.4 mg/dL Final   Hospital Outpatient Visit on 12/13/2019   Component Date Value Ref Range Status    Ascending aorta 12/13/2019 2.93  cm Final    STJ 12/13/2019 2.58  cm Final    AV mean gradient 12/13/2019 5  mmHg Final    Ao peak lucy 12/13/2019 1.41  m/s Final    Ao VTI 12/13/2019 22.45  cm Final    IVRT 12/13/2019 0.12  msec Final    IVS 12/13/2019 0.81  0.6 - 1.1 cm Final    LA size 12/13/2019 3.21  cm Final    Left Atrium Major Axis 12/13/2019 4.61  cm Final    Left Atrium Minor Axis 12/13/2019 4.65  cm Final    LVIDD 12/13/2019 3.92  3.5 - 6.0 cm Final    LVIDS 12/13/2019 2.67  2.1 - 4.0 cm  Final    LVOT diameter 12/13/2019 1.99  cm Final    LVOT peak VTI 12/13/2019 16.27  cm Final    PW 12/13/2019 0.82  0.6 - 1.1 cm Final    MV Peak A Humberto 12/13/2019 0.74  m/s Final    E wave decelartion time 12/13/2019 197.31  msec Final    MV Peak E Humberto 12/13/2019 0.45  m/s Final    PV Peak D Humberto 12/13/2019 0.29  m/s Final    PV Peak S Humberto 12/13/2019 0.70  m/s Final    RA Major Axis 12/13/2019 4.40  cm Final    RA Width 12/13/2019 2.57  cm Final    RVDD 12/13/2019 2.84  cm Final    Sinus 12/13/2019 3.03  cm Final    TAPSE 12/13/2019 2.09  cm Final    TR Max Humberto 12/13/2019 2.29  m/s Final    TDI LATERAL 12/13/2019 0.11  m/s Final    TDI SEPTAL 12/13/2019 0.06  m/s Final    LA WIDTH 12/13/2019 3.11  cm Final    LV Diastolic Volume 12/13/2019 66.91  mL Final    LV Systolic Volume 12/13/2019 26.33  mL Final    RV S' 12/13/2019 11.72  cm/s Final    LVOT peak humberto 12/13/2019 1.03  m/s Final    LV LATERAL E/E' RATIO 12/13/2019 4.09  m/s Final    LV SEPTAL E/E' RATIO 12/13/2019 7.50  m/s Final    FS 12/13/2019 32  % Final    LA volume 12/13/2019 39.29  cm3 Final    LV mass 12/13/2019 92.72  g Final    Left Ventricle Relative Wall Thick* 12/13/2019 0.42  cm Final    AV valve area 12/13/2019 2.25  cm2 Final    AV Velocity Ratio 12/13/2019 0.73   Final    AV index (prosthetic) 12/13/2019 0.72   Final    E/A ratio 12/13/2019 0.61   Final    Mean e' 12/13/2019 0.09  m/s Final    Pulm vein S/D ratio 12/13/2019 2.41   Final    LVOT area 12/13/2019 3.1  cm2 Final    LVOT stroke volume 12/13/2019 50.58  cm3 Final    AV peak gradient 12/13/2019 8  mmHg Final    E/E' ratio 12/13/2019 5.29  m/s Final    Triscuspid Valve Regurgitation Pea* 12/13/2019 21  mmHg Final    BSA 12/13/2019 1.67  m2 Final    Systolic blood pressure 12/13/2019 122  mmHg Final    Diastolic blood pressure 12/13/2019 80  mmHg Final    HR at rest 12/13/2019 89  bpm Final    RPP 12/13/2019 10,858   Final    Peak HR 12/13/2019  137  bpm Final    Peak Systolic BP 12/13/2019 108  mmHg Final    Peak Diatolic BP 12/13/2019 43  mmHg Final    Peak RPP 12/13/2019 14,796   Final    Max Predicted HR 12/13/2019 158   Final    85% Max Predicted HR 12/13/2019 135   Final    % Max HR Achieved 12/13/2019 86   Final    1 Minute Recovery HR 12/13/2019 134  bpm Final    OHS CV CPX PATIENT IS MALE 12/13/2019 0   Final    OHS CV CPX PATIENT IS FEMALE 12/13/2019 1   Final    LV Systolic Volume Index 12/13/2019 15.9  mL/m2 Final    LV Diastolic Volume Index 12/13/2019 40.39  mL/m2 Final    LA Volume Index 12/13/2019 23.7  mL/m2 Final    LV Mass Index 12/13/2019 56  g/m2 Final    Right Atrial Pressure (from IVC) 12/13/2019 3  mmHg Final    TV rest pulmonary artery pressure 12/13/2019 24  mmHg Final   Clinical Support on 12/12/2019   Component Date Value Ref Range Status    Specimen UA 12/12/2019 Urine, Clean Catch   Final    Color, UA 12/12/2019 Yellow  Yellow, Straw, Tracie Final    Appearance, UA 12/12/2019 Hazy* Clear Final    pH, UA 12/12/2019 7.0  5.0 - 8.0 Final    Specific Gravity, UA 12/12/2019 1.015  1.005 - 1.030 Final    Protein, UA 12/12/2019 Negative  Negative Final    Comment: Recommend a 24 hour urine protein or a urine   protein/creatinine ratio if globulin induced proteinuria is  clinically suspected.      Glucose, UA 12/12/2019 Negative  Negative Final    Ketones, UA 12/12/2019 Negative  Negative Final    Bilirubin (UA) 12/12/2019 Negative  Negative Final    Occult Blood UA 12/12/2019 Negative  Negative Final    Nitrite, UA 12/12/2019 Negative  Negative Final    Leukocytes, UA 12/12/2019 Trace* Negative Final    Alcohol, Urine 12/12/2019 <10  <10 mg/dL Final    Benzodiazepines 12/12/2019 Negative   Final    Methadone metabolites 12/12/2019 Negative   Final    Cocaine (Metab.) 12/12/2019 Negative   Final    Opiate Scrn, Ur 12/12/2019 Negative   Final    Barbiturate Screen, Ur 12/12/2019 Negative   Final     Amphetamine Screen, Ur 12/12/2019 Negative   Final    THC 12/12/2019 Negative   Final    Phencyclidine 12/12/2019 Negative   Final    Creatinine, Random Ur 12/12/2019 78.0  15.0 - 325.0 mg/dL Final    Comment: The random urine reference ranges provided were established   for 24 hour urine collections.  No reference ranges exist for  random urine specimens.  Correlate clinically.      Toxicology Information 12/12/2019 SEE COMMENT   Final    Comment: This screen includes the following classes of drugs at the   listed cut-off:  Benzodiazepines                  200 ng/ml  Methadone                        300 ng/ml  Cocaine metabolite               300 ng/ml  Opiates                          300 ng/ml  Barbiturates                     200 ng/ml  Amphetamines                    1000 ng/ml  Marijuana metabs (THC)            50 ng/ml  Phencyclidine (PCP)               25 ng/ml  High concentrations of Diphenhydramine may cross-react with  Phencyclidine PCP screening immunoassay giving a false   positive result.  High concentrations of Methylenedioxymethamphetamine (MDMA aka  Ectasy) and other structurally similar compounds may cross-   react with the Amphetamine/Methamphetamine screening   immunoassay giving a false positive result.  A metabolite of the anti-HIV drug Sustiva () may cause  false positive results in the Marijuana metabolite (THC)   screening assay.  Note: This exception list includes only more common   interferants i                           n toxicology screen testing.  Because of many   cross-reactantspositive results on toxicology drug screens   should be confirmed whenever results do not correlate with   clinical presentation.  This report is intended for use in clinical monitoring and  management of patients. It is not intended for use in   employment related drug testing.  Because of any cross-reactants, positive results on toxicology  drug screens should be confirmed whenever results do  not  correlate with clinical presentation.  Presumptive positive results are unconfirmed and may be used   only for medical purposes.  Assay Intended Use: This assay provides only a preliminary analytical  test result. A more specific alternate chemical method must be used  to obtain a confirmed analytical result. Gas chromatography/mass  spectrometry (GS/MS)is the preferred confirmatory method. Clinical  consideration and professional judgement should be applied to any   drug of abuse test result, particularly when preliminary resul                           ts  are used.      RBC, UA 12/12/2019 6* 0 - 4 /hpf Final    WBC, UA 12/12/2019 7* 0 - 5 /hpf Final    Bacteria 12/12/2019 Few* None-Occ /hpf Final    Squam Epithel, UA 12/12/2019 16  /hpf Final    Hyaline Casts, UA 12/12/2019 4* 0-1/lpf /lpf Final    Microscopic Comment 12/12/2019 SEE COMMENT   Final    Comment: Other formed elements not mentioned in the report are not   present in the microscopic examination.      Lab Visit on 12/12/2019   Component Date Value Ref Range Status    Group & Rh 12/12/2019 O POS   Final    Indirect Hosea 12/12/2019 NEG   Final    hCG Quant 12/12/2019 <1.2  See Text mIU/mL Final    Comment: Quantitative HCG Reference Ranges:  Males........................<5.0 mIU/mL  Non-Pregnant Females.........<5.0 mIU/mL  Pregnancy:  Weeks post-LMP...............Range (mIU/mL)  1-10  weeks.....................202-231,000  11-15 weeks..................22,536-234,990  16-22 weeks...................8,007-50,064  23-40 weeks...................1,600-49,413  NOTE:  This assay is not FDA approved for tumor screening,   diagnosis, or monitoring.      Sodium 12/12/2019 137  136 - 145 mmol/L Final    Potassium 12/12/2019 3.0* 3.5 - 5.1 mmol/L Final    Chloride 12/12/2019 92* 95 - 110 mmol/L Final    CO2 12/12/2019 29  23 - 29 mmol/L Final    Glucose 12/12/2019 124* 70 - 110 mg/dL Final    BUN, Bld 12/12/2019 22* 6 - 20 mg/dL Final     Creatinine 12/12/2019 1.1  0.5 - 1.4 mg/dL Final    Calcium 12/12/2019 10.9* 8.7 - 10.5 mg/dL Final    Total Protein 12/12/2019 8.9* 6.0 - 8.4 g/dL Final    Albumin 12/12/2019 4.5  3.5 - 5.2 g/dL Final    Total Bilirubin 12/12/2019 1.7* 0.1 - 1.0 mg/dL Final    Comment: For infants and newborns, interpretation of results should be based  on gestational age, weight and in agreement with clinical  observations.  Premature Infant recommended reference ranges:  Up to 24 hours.............<8.0 mg/dL  Up to 48 hours............<12.0 mg/dL  3-5 days..................<15.0 mg/dL  6-29 days.................<15.0 mg/dL      Alkaline Phosphatase 12/12/2019 188* 55 - 135 U/L Final    AST 12/12/2019 42* 10 - 40 U/L Final    ALT 12/12/2019 39  10 - 44 U/L Final    Anion Gap 12/12/2019 16  8 - 16 mmol/L Final    eGFR if African American 12/12/2019 >60.0  >60 mL/min/1.73 m^2 Final    eGFR if non  12/12/2019 57.1* >60 mL/min/1.73 m^2 Final    Comment: Calculation used to obtain the estimated glomerular filtration  rate (eGFR) is the CKD-EPI equation.       Bilirubin, Direct 12/12/2019 0.7* 0.1 - 0.3 mg/dL Final    WBC 12/12/2019 10.08  3.90 - 12.70 K/uL Final    RBC 12/12/2019 4.28  4.00 - 5.40 M/uL Final    Hemoglobin 12/12/2019 13.3  12.0 - 16.0 g/dL Final    Hematocrit 12/12/2019 40.3  37.0 - 48.5 % Final    Mean Corpuscular Volume 12/12/2019 94  82 - 98 fL Final    Mean Corpuscular Hemoglobin 12/12/2019 31.1* 27.0 - 31.0 pg Final    Mean Corpuscular Hemoglobin Conc 12/12/2019 33.0  32.0 - 36.0 g/dL Final    RDW 12/12/2019 13.0  11.5 - 14.5 % Final    Platelets 12/12/2019 194  150 - 350 K/uL Final    MPV 12/12/2019 9.8  9.2 - 12.9 fL Final    Immature Granulocytes 12/12/2019 0.3  0.0 - 0.5 % Final    Gran # (ANC) 12/12/2019 6.8  1.8 - 7.7 K/uL Final    Immature Grans (Abs) 12/12/2019 0.03  0.00 - 0.04 K/uL Final    Comment: Mild elevation in immature granulocytes is non specific and   can  be seen in a variety of conditions including stress response,   acute inflammation, trauma and pregnancy. Correlation with other   laboratory and clinical findings is essential.      Lymph # 12/12/2019 2.2  1.0 - 4.8 K/uL Final    Mono # 12/12/2019 0.5  0.3 - 1.0 K/uL Final    Eos # 12/12/2019 0.5  0.0 - 0.5 K/uL Final    Baso # 12/12/2019 0.06  0.00 - 0.20 K/uL Final    nRBC 12/12/2019 0  0 /100 WBC Final    Gran% 12/12/2019 67.5  38.0 - 73.0 % Final    Lymph% 12/12/2019 21.5  18.0 - 48.0 % Final    Mono% 12/12/2019 5.2  4.0 - 15.0 % Final    Eosinophil% 12/12/2019 4.9  0.0 - 8.0 % Final    Basophil% 12/12/2019 0.6  0.0 - 1.9 % Final    Differential Method 12/12/2019 Automated   Final    Prothrombin Time 12/12/2019 12.2  9.0 - 12.5 sec Final    INR 12/12/2019 1.2  0.8 - 1.2 Final    Comment: Coumadin Therapy:  2.0 - 3.0 for INR for all indicators except mechanical heart valves  and antiphospholipid syndromes which should use 2.5 - 3.5.      AFP 12/12/2019 4.3  0.0 - 8.4 ng/mL Final   Orders Only on 12/07/2019   Component Date Value Ref Range Status    Glucose 12/07/2019 114* 65 - 99 mg/dL Final    BUN, Bld 12/07/2019 18  6 - 24 mg/dL Final    Creatinine 12/07/2019 0.94  0.57 - 1.00 mg/dL Final    eGFR if non African American 12/07/2019 69  >59 mL/min/1.73 Final    eGFR if African American 12/07/2019 80  >59 mL/min/1.73 Final    BUN/Creatinine Ratio 12/07/2019 19  9 - 23 Final    Sodium 12/07/2019 135  134 - 144 mmol/L Final    Potassium 12/07/2019 3.3* 3.5 - 5.2 mmol/L Final    Chloride 12/07/2019 89* 96 - 106 mmol/L Final    CO2 12/07/2019 25  20 - 29 mmol/L Final    Calcium 12/07/2019 10.2  8.7 - 10.2 mg/dL Final    Total Protein 12/07/2019 8.2  6.0 - 8.5 g/dL Final    Albumin 12/07/2019 4.9  3.5 - 5.5 g/dL Final    Globulin, Total 12/07/2019 3.3  1.5 - 4.5 g/dL Final    Albumin/Globulin Ratio 12/07/2019 1.5  1.2 - 2.2 Final    Total Bilirubin 12/07/2019 0.8  0.0 - 1.2 mg/dL Final     Alkaline Phosphatase 12/07/2019 189* 39 - 117 IU/L Final    AST 12/07/2019 29  0 - 40 IU/L Final    ALT 12/07/2019 32  0 - 32 IU/L Final   Lab Visit on 11/26/2019   Component Date Value Ref Range Status    Hep B Core Total Ab 11/26/2019 Negative   Final    Hepatitis B Surface Ag 11/26/2019 Negative  Negative Final    HIV 1/2 Ag/Ab 11/26/2019 Negative  Negative Final    Hepatitis C Ab 11/26/2019 Negative  Negative Final    CMV IgG Interpretation 11/26/2019 Non-Reactive  Non-Reactive Final    RPR 11/26/2019 Non-reactive  Non-reactive Final    Sodium 11/26/2019 134* 136 - 145 mmol/L Final    Potassium 11/26/2019 3.0* 3.5 - 5.1 mmol/L Final    Chloride 11/26/2019 90* 95 - 110 mmol/L Final    CO2 11/26/2019 33* 23 - 29 mmol/L Final    Glucose 11/26/2019 105  70 - 110 mg/dL Final    BUN, Bld 11/26/2019 15  6 - 20 mg/dL Final    Creatinine 11/26/2019 1.0  0.5 - 1.4 mg/dL Final    Calcium 11/26/2019 10.8* 8.7 - 10.5 mg/dL Final    Total Protein 11/26/2019 9.0* 6.0 - 8.4 g/dL Final    Albumin 11/26/2019 4.4  3.5 - 5.2 g/dL Final    Total Bilirubin 11/26/2019 1.5* 0.1 - 1.0 mg/dL Final    Comment: For infants and newborns, interpretation of results should be based  on gestational age, weight and in agreement with clinical  observations.  Premature Infant recommended reference ranges:  Up to 24 hours.............<8.0 mg/dL  Up to 48 hours............<12.0 mg/dL  3-5 days..................<15.0 mg/dL  6-29 days.................<15.0 mg/dL      Alkaline Phosphatase 11/26/2019 231* 55 - 135 U/L Final    AST 11/26/2019 33  10 - 40 U/L Final    ALT 11/26/2019 34  10 - 44 U/L Final    Anion Gap 11/26/2019 11  8 - 16 mmol/L Final    eGFR if African American 11/26/2019 >60.0  >60 mL/min/1.73 m^2 Final    eGFR if non African American 11/26/2019 >60.0  >60 mL/min/1.73 m^2 Final    Comment: Calculation used to obtain the estimated glomerular filtration  rate (eGFR) is the CKD-EPI equation.       GGT  11/26/2019 170* 8 - 55 U/L Final    Bilirubin, Direct 11/26/2019 0.7* 0.1 - 0.3 mg/dL Final    WBC 11/26/2019 11.37  3.90 - 12.70 K/uL Final    RBC 11/26/2019 4.34  4.00 - 5.40 M/uL Final    Hemoglobin 11/26/2019 13.7  12.0 - 16.0 g/dL Final    Hematocrit 11/26/2019 41.0  37.0 - 48.5 % Final    Mean Corpuscular Volume 11/26/2019 95  82 - 98 fL Final    Mean Corpuscular Hemoglobin 11/26/2019 31.6* 27.0 - 31.0 pg Final    Mean Corpuscular Hemoglobin Conc 11/26/2019 33.4  32.0 - 36.0 g/dL Final    RDW 11/26/2019 13.0  11.5 - 14.5 % Final    Platelets 11/26/2019 264  150 - 350 K/uL Final    MPV 11/26/2019 9.4  9.2 - 12.9 fL Final    Immature Granulocytes 11/26/2019 0.5  0.0 - 0.5 % Final    Gran # (ANC) 11/26/2019 7.7  1.8 - 7.7 K/uL Final    Immature Grans (Abs) 11/26/2019 0.06* 0.00 - 0.04 K/uL Final    Comment: Mild elevation in immature granulocytes is non specific and   can be seen in a variety of conditions including stress response,   acute inflammation, trauma and pregnancy. Correlation with other   laboratory and clinical findings is essential.      Lymph # 11/26/2019 2.3  1.0 - 4.8 K/uL Final    Mono # 11/26/2019 0.8  0.3 - 1.0 K/uL Final    Eos # 11/26/2019 0.5  0.0 - 0.5 K/uL Final    Baso # 11/26/2019 0.06  0.00 - 0.20 K/uL Final    nRBC 11/26/2019 0  0 /100 WBC Final    Gran% 11/26/2019 67.6  38.0 - 73.0 % Final    Lymph% 11/26/2019 20.3  18.0 - 48.0 % Final    Mono% 11/26/2019 6.6  4.0 - 15.0 % Final    Eosinophil% 11/26/2019 4.5  0.0 - 8.0 % Final    Basophil% 11/26/2019 0.5  0.0 - 1.9 % Final    Differential Method 11/26/2019 Automated   Final    Prothrombin Time 11/26/2019 12.8* 9.0 - 12.5 sec Final    INR 11/26/2019 1.3* 0.8 - 1.2 Final    Comment: Coumadin Therapy:  2.0 - 3.0 for INR for all indicators except mechanical heart valves  and antiphospholipid syndromes which should use 2.5 - 3.5.      Strongyloides Ab IgG 11/26/2019 Negative  Negative Final    Comment: No  detectable levels of IgG antibodies to Strongyloides.  Repeat testing in 1-2 weeks if clinically indicated.  Test Performed by:  [facility]  [address] [place] [address]  : Ramon Bradford M.D. Ph.D.; CLIA# [id number]      TSH 11/26/2019 2.271  0.400 - 4.000 uIU/mL Final    Iron 11/26/2019 46  30 - 160 ug/dL Final    Transferrin 11/26/2019 318  200 - 375 mg/dL Final    TIBC 11/26/2019 471* 250 - 450 ug/dL Final    Saturated Iron 11/26/2019 10* 20 - 50 % Final    Ferritin 11/26/2019 310* 20.0 - 300.0 ng/mL Final    Hep B S Ab 11/26/2019 Negative   Final    Hepatitis A Antibody IgG 11/26/2019 Negative   Final    A-1 Antitrypsin, Ser 11/26/2019 209* 100 - 190 mg/dL Final    Varicella Zoster IgG 11/26/2019 0.31  0.00 - 0.90 ISR Final    Varicella Interpretation 11/26/2019 Negative  Negative Final    Comment: <or=0.8    Negative        No detectable IgG antibody to Varicella zoster  by the JILL test. Such individuals are presumed to be   uninfected with Varicella zoster and to be susceptible to   primary infection.  0.9-1.0    Equivocal  >or=1.1    Positive        Indicates presence of detectable IgG antibody to Varicella   zoster by the JILL test. Indicative of previous or current   infection.      Ceruloplasmin 11/26/2019 44.0  15.0 - 45.0 mg/dL Final    NIL 11/26/2019 0.020  See text IU/mL Final    TB1 - Nil 11/26/2019 0.000  See text IU/mL Final    TB2 - Nil 11/26/2019 0.000  See text IU/mL Final    Mitogen - Nil 11/26/2019 >10.000  See text IU/mL Final    TB Gold Plus 11/26/2019 Negative   Final    Comment: The Nil tube value is used to determine if the patient has a  preexisting immune response which could cause a false-positive  reading on the test.  In order for a test to be valid, the   NIL tube must have a value of less than or equal to 8.0 IU/mL.  The mitogen control tube is used to assure the patient has a   healthy  immune status and also serves as a control for correct  blood handling and incubation.  It is used to detect false-  negative readings.  The mitogen tube must have a gamma   interferon value of greater than or equal to 0.5 IU/mL higher  that the value of the Nil tube.  The TB antigen tubes are coated with the M. tuberculosis   specific antigens. For a test to be considered positive,the  TB antigen tube values minus the Nil tube value must be   greater than or equal to 0.35 IU/mL.  Diagnosing or excluding tuberculosis disease, and assessing  the probability of LTNI, requires a combination of   epidemiological, historical, medical, and diagnostic findings  that should be                            taken into account when interpreting   Quantiferon-TB Gold Plus results.      Phosphatidylethanol 11/26/2019 38* Negative ng/mL Final    Comment: Analyzed compound: PEth 16:0/18:1  1-palmitoyl-2-xquyxu-is-nflvszc-3-phosphoethanol.  Analysis performed by Liquid Chromatography with Tandem Mass  Spectrometry (LC/MS/MS).  Detection limit 20ng/ml  PEth levels in excess of 20ng/ml are considered evidence of   moderate to heavy ethanol consumption.  However,the Center for   Substance Abuse Treatment (CSAT) advises caution in interpretation  and use of biomarkers alone to assess alcohol use. Results should be   interpreted in the context of all available clinical and behavioral   information.   Reference: Substance Abuse and Mental Health Servies Administration  (2012). &quot;The Role of Biomarkers in the Treatment of Alcohol   Use        Disorders&quot;, 2012 Revision. Advisory, Volume 11,Issue 2.  This test was developed and its performance characteristics determined  by [facility].  It has not been cleared or approved by the Food and Drug  Administration.  *Performing location:  [facility].  [address] [place] [address]                            [id number]      Retinol, serum 11/26/2019 38  38 - 106 ug/dL Final     Comment: This test was developed and the performance   characteristics determined by [facility].   It has not been cleared or approved by the FDA.   The laboratory is regulated under CLIA as qualified to   perform high-complexity testing. This test is used for   patient testing purposes. It should not be regarded   as investigational or for research.  Test performed at [facility],  [address] , [place]  [address]     [phone]  Ramon Jeronimo MD  - Medical Director      Vit D, 25-Hydroxy 11/26/2019 54  30 - 96 ng/mL Final    Comment: Vitamin D deficiency.........<10 ng/mL                              Vitamin D insufficiency......10-29 ng/mL       Vitamin D sufficiency........> or equal to 30 ng/mL  Vitamin D toxicity............>100 ng/mL      Zinc 11/26/2019 53* 60 - 130 ug/dL Final    Comment: Elevated results may be due to sample collected in a   non-certified trace element-free tube.   This test was developed and the performance   characteristics determined by [facility].   It has not been cleared or approved by the FDA.   The laboratory is regulated under CLIA as qualified to   perform high-complexity testing. This test is used for   patient testing purposes. It should not be regarded   as investigational or for research.  Test performed at [facility],  [address] [facility] [facility], [place]  [address]     [phone]  Ramon Jeronimo MD  - Medical Director      MYA Screen 11/26/2019 Negative <1:160  Negative <1:160 Final    Smooth Muscle Ab 11/26/2019 Positive 1:40* Negative Final    Anti-Mitochon Ab IFA 11/26/2019 Negative 1:40  Negative Final    IgG - Serum 11/26/2019 1592  650 - 1600 mg/dL Final    IgG Cord Blood Reference Range: 650-1600 mg/dL.    IgA 11/26/2019 333  40 - 350 mg/dL Final    IgA Cord Blood Reference Range: <5 mg/dL.    IgM 11/26/2019 250  50 - 300 mg/dL Final    IgM Cord Blood Reference Range: <25 mg/dL.    Hemoglobin A1C  11/26/2019 5.8* 4.0 - 5.6 % Final    Comment: ADA Screening Guidelines:  5.7-6.4%  Consistent with prediabetes  >or=6.5%  Consistent with diabetes  High levels of fetal hemoglobin interfere with the HbA1C  assay. Heterozygous hemoglobin variants (HbS, HgC, etc)do  not significantly interfere with this assay.   However, presence of multiple variants may affect accuracy.      Estimated Avg Glucose 11/26/2019 120  68 - 131 mg/dL Final    Group & Rh 11/26/2019 O POS   Final    Indirect Hosea 11/26/2019 NEG   Final   Lab Visit on 11/21/2019   Component Date Value Ref Range Status    WBC 11/21/2019 7.97  3.90 - 12.70 K/uL Final    RBC 11/21/2019 3.84* 4.00 - 5.40 M/uL Final    Hemoglobin 11/21/2019 12.1  12.0 - 16.0 g/dL Final    Hematocrit 11/21/2019 37.5  37.0 - 48.5 % Final    Mean Corpuscular Volume 11/21/2019 98  82 - 98 fL Final    Mean Corpuscular Hemoglobin 11/21/2019 31.5* 27.0 - 31.0 pg Final    Mean Corpuscular Hemoglobin Conc 11/21/2019 32.3  32.0 - 36.0 g/dL Final    RDW 11/21/2019 13.0  11.5 - 14.5 % Final    Platelets 11/21/2019 184  150 - 350 K/uL Final    MPV 11/21/2019 9.5  9.2 - 12.9 fL Final    Immature Granulocytes 11/21/2019 0.3  0.0 - 0.5 % Final    Gran # (ANC) 11/21/2019 4.9  1.8 - 7.7 K/uL Final    Immature Grans (Abs) 11/21/2019 0.02  0.00 - 0.04 K/uL Final    Comment: Mild elevation in immature granulocytes is non specific and   can be seen in a variety of conditions including stress response,   acute inflammation, trauma and pregnancy. Correlation with other   laboratory and clinical findings is essential.      Lymph # 11/21/2019 1.9  1.0 - 4.8 K/uL Final    Mono # 11/21/2019 0.6  0.3 - 1.0 K/uL Final    Eos # 11/21/2019 0.5  0.0 - 0.5 K/uL Final    Baso # 11/21/2019 0.06  0.00 - 0.20 K/uL Final    nRBC 11/21/2019 0  0 /100 WBC Final    Gran% 11/21/2019 61.8  38.0 - 73.0 % Final    Lymph% 11/21/2019 23.3  18.0 - 48.0 % Final    Mono% 11/21/2019 7.8  4.0 - 15.0 % Final     Eosinophil% 11/21/2019 6.0  0.0 - 8.0 % Final    Basophil% 11/21/2019 0.8  0.0 - 1.9 % Final    Differential Method 11/21/2019 Automated   Final    Sodium 11/21/2019 133* 136 - 145 mmol/L Final    Potassium 11/21/2019 3.8  3.5 - 5.1 mmol/L Final    Chloride 11/21/2019 95  95 - 110 mmol/L Final    CO2 11/21/2019 28  23 - 29 mmol/L Final    Glucose 11/21/2019 116* 70 - 110 mg/dL Final    BUN, Bld 11/21/2019 12  6 - 20 mg/dL Final    Creatinine 11/21/2019 0.8  0.5 - 1.4 mg/dL Final    Calcium 11/21/2019 9.9  8.7 - 10.5 mg/dL Final    Total Protein 11/21/2019 7.5  6.0 - 8.4 g/dL Final    Albumin 11/21/2019 3.9  3.5 - 5.2 g/dL Final    Total Bilirubin 11/21/2019 1.6* 0.1 - 1.0 mg/dL Final    Comment: For infants and newborns, interpretation of results should be based  on gestational age, weight and in agreement with clinical  observations.  Premature Infant recommended reference ranges:  Up to 24 hours.............<8.0 mg/dL  Up to 48 hours............<12.0 mg/dL  3-5 days..................<15.0 mg/dL  6-29 days.................<15.0 mg/dL      Alkaline Phosphatase 11/21/2019 191* 55 - 135 U/L Final    AST 11/21/2019 34  10 - 40 U/L Final    ALT 11/21/2019 33  10 - 44 U/L Final    Anion Gap 11/21/2019 10  8 - 16 mmol/L Final    eGFR if African American 11/21/2019 >60.0  >60 mL/min/1.73 m^2 Final    eGFR if non African American 11/21/2019 >60.0  >60 mL/min/1.73 m^2 Final    Comment: Calculation used to obtain the estimated glomerular filtration  rate (eGFR) is the CKD-EPI equation.       Phosphatidylethanol 11/21/2019 38* Negative ng/mL Final    Comment: Analyzed compound: PEth 16:0/18:1  1-palmitoyl-2-lnvdzg-gl-hqjagth-3-phosphoethanol.  Analysis performed by Liquid Chromatography with Tandem Mass  Spectrometry (LC/MS/MS).  Detection limit 20ng/ml  PEth levels in excess of 20ng/ml are considered evidence of   moderate to heavy ethanol consumption.  However,the Center for   Substance Abuse  Treatment (CSAT) advises caution in interpretation  and use of biomarkers alone to assess alcohol use. Results should be   interpreted in the context of all available clinical and behavioral   information.   Reference: Substance Abuse and Mental Health Servies Administration  (2012). &quot;The Role of Biomarkers in the Treatment of Alcohol   Use        Disorders&quot;, 2012 Revision. Advisory, Volume 11,Issue 2.  This test was developed and its performance characteristics determined  by [employer].  It has not been cleared or approved by the Food and Drug  Administration.  *Performing location:  [facility].  [address] [place] [address]      Prothrombin Time 11/21/2019 11.8  9.0 - 12.5 sec Final    INR 11/21/2019 1.2  0.8 - 1.2 Final    Comment: Coumadin Therapy:  2.0 - 3.0 for INR for all indicators except mechanical heart valves  and antiphospholipid syndromes which should use 2.5 - 3.5.     Orders Only on 11/09/2019   Component Date Value Ref Range Status    Glucose 11/09/2019 188* 65 - 99 mg/dL Final    BUN, Bld 11/09/2019 12  6 - 24 mg/dL Final    Creatinine 11/09/2019 0.66  0.57 - 1.00 mg/dL Final    eGFR if non African American 11/09/2019 100  >59 mL/min/1.73 Final    eGFR if  11/09/2019 116  >59 mL/min/1.73 Final    BUN/Creatinine Ratio 11/09/2019 18  9 - 23 Final    Sodium 11/09/2019 128* 134 - 144 mmol/L Final    Potassium 11/09/2019 3.6  3.5 - 5.2 mmol/L Final    Chloride 11/09/2019 86* 96 - 106 mmol/L Final    CO2 11/09/2019 24  20 - 29 mmol/L Final    Calcium 11/09/2019 9.3  8.7 - 10.2 mg/dL Final    Total Protein 11/09/2019 7.1  6.0 - 8.5 g/dL Final    Albumin 11/09/2019 4.3  3.5 - 5.5 g/dL Final    Globulin, Total 11/09/2019 2.8  1.5 - 4.5 g/dL Final    Albumin/Globulin Ratio 11/09/2019 1.5  1.2 - 2.2 Final    Total Bilirubin 11/09/2019 0.9  0.0 - 1.2 mg/dL Final    Alkaline Phosphatase 11/09/2019 170* 39 - 117 IU/L  "Final    AST 11/09/2019 36  0 - 40 IU/L Final    ALT 11/09/2019 37* 0 - 32 IU/L Final   Orders Only on 11/06/2019   Component Date Value Ref Range Status    Glucose 11/06/2019 120* 65 - 99 mg/dL Final    BUN, Bld 11/06/2019 17  6 - 24 mg/dL Final    Creatinine 11/06/2019 0.77  0.57 - 1.00 mg/dL Final    eGFR if non African American 11/06/2019 88  >59 mL/min/1.73 Final    eGFR if African American 11/06/2019 101  >59 mL/min/1.73 Final    BUN/Creatinine Ratio 11/06/2019 22  9 - 23 Final    Sodium 11/06/2019 129* 134 - 144 mmol/L Final    Potassium 11/06/2019 3.2* 3.5 - 5.2 mmol/L Final    Chloride 11/06/2019 85* 96 - 106 mmol/L Final    CO2 11/06/2019 29  20 - 29 mmol/L Final    Calcium 11/06/2019 9.7  8.7 - 10.2 mg/dL Final    Total Protein 11/06/2019 7.6  6.0 - 8.5 g/dL Final    Albumin 11/06/2019 4.4  3.5 - 5.5 g/dL Final    Globulin, Total 11/06/2019 3.2  1.5 - 4.5 g/dL Final    Albumin/Globulin Ratio 11/06/2019 1.4  1.2 - 2.2 Final    Total Bilirubin 11/06/2019 0.6  0.0 - 1.2 mg/dL Final    Alkaline Phosphatase 11/06/2019 205* 39 - 117 IU/L Final    AST 11/06/2019 42* 0 - 40 IU/L Final    ALT 11/06/2019 40* 0 - 32 IU/L Final   There may be more visits with results that are not included.         MEDICATIONS  Outpatient Encounter Medications as of 12/18/2019   Medication Sig Dispense Refill    DULoxetine (CYMBALTA) 60 MG capsule Take 60 mg by mouth once daily.       furosemide (LASIX) 40 MG tablet Take 0.5 tablets (20 mg total) by mouth once daily. 15 tablet 11    gabapentin (NEURONTIN) 300 MG capsule Take 1 capsule (300 mg total) by mouth every evening. 30 capsule 11    insulin detemir (LEVEMIR U-100 INSULIN SUBQ) Inject 40 Units into the skin once daily.      metOLazone (ZAROXOLYN) 2.5 MG tablet Take every other day 30 min prior to other diuretics 30 tablet 11    multivit with minerals/lutein (MULTIVITAMIN 50 PLUS ORAL) Take by mouth.      pen needle, diabetic 30 gauge x 1/3" Ndle " "NovoFine 30 30 gauge x 1/3" needle      spironolactone (ALDACTONE) 100 MG tablet Take 2 tablets (200 mg total) by mouth once daily. 30 tablet 11    thiamine (VITAMIN B-1) 100 MG tablet Take 100 mg by mouth.       No facility-administered encounter medications on file as of 12/18/2019.            Philippe Castro      "

## 2019-12-20 ENCOUNTER — TELEPHONE (OUTPATIENT)
Dept: TRANSPLANT | Facility: CLINIC | Age: 54
End: 2019-12-20

## 2019-12-20 NOTE — TELEPHONE ENCOUNTER
.  Patient: Brunilda Delgadillo       MRN: [id number]      : 1965     Age: 54 y.o.  [address] [facility] [address]    Provider: Hepatologist - Daphne    Urgency of review: non-urgent    Patient Transplant Status: In Evaluation    Reason for presentation: Indeterminate lesion    Clinical Summary: 54 y.o. female with PMHx DM, depression and anxiety who has ESLD secondary to alcoholic liver disease. She has a 14 and 17 year old who live at home.     --pt has drank alcohol most of her adult life; last drink ~1 month ago  --developed ascites 2019 and has had 3 paracenteses (last one month ago)  --lasix on hold due to hyponatremia- as low as 119.  --pt is eating low salt diet  --no HE  --PHG but no varices on EGD     US Abdomen 19: 1.0 x 0.9 x 0.9 cm hypoechoic lesion in the right hepatic lobe at the dome.    Imaging to be reviewed: CT Abd w/ Contrast 19    HCC Treatment History: NA    ABO: O POS    Platelets:   Lab Results   Component Value Date/Time     2019 08:10 AM     Creatinine:   Lab Results   Component Value Date/Time    CREATININE 0.94 2019 08:03 AM     Bilirubin:   Lab Results   Component Value Date/Time    BILITOT 1.1 2019 08:03 AM     AFP Last 3 each if available:   Lab Results   Component Value Date/Time    AFP 4.3 2019 08:10 AM    AFP 6.1 2019 01:10 PM       MELD: MELD-Na score: 9 at 2019  8:03 AM  MELD score: 9 at 2019  8:03 AM  Calculated from:  Serum Creatinine: 0.94 mg/dL (Rounded to 1 mg/dL) at 2019  8:03 AM  Serum Sodium: 137 mmol/L at 2019  8:03 AM  Total Bilirubin: 1.1 mg/dL at 2019  8:03 AM  INR(ratio): 1.2 at 2019  8:10 AM  Age: 54 years    Plan:     Follow-up Provider:

## 2019-12-23 ENCOUNTER — TELEPHONE (OUTPATIENT)
Dept: TRANSPLANT | Facility: CLINIC | Age: 54
End: 2019-12-23

## 2019-12-23 NOTE — TELEPHONE ENCOUNTER
.  Patient: Brunilda Delgadillo       MRN: [id number]      : 1965     Age: 54 y.o.  [address] [facility] [address]    Provider: Hepatologist - Daphne    Urgency of review: non-urgent    Patient Transplant Status: In Evaluation    Reason for presentation: Indeterminate lesion    Clinical Summary: 54 y.o. female with PMHx DM, depression and anxiety who has ESLD secondary to alcoholic liver disease. She has a 14 and 17 year old who live at home.     --pt has drank alcohol most of her adult life; last drink ~1 month ago  --developed ascites 2019 and has had 3 paracenteses (last one month ago)  --lasix on hold due to hyponatremia- as low as 119.  --pt is eating low salt diet  --no HE  --PHG but no varices on EGD     US Abdomen 19: 1.0 x 0.9 x 0.9 cm hypoechoic lesion in the right hepatic lobe at the dome.    Imaging to be reviewed: CT Abd w/ Contrast 19    HCC Treatment History: NA    ABO: O POS    Platelets:   Lab Results   Component Value Date/Time     2019 08:10 AM     Creatinine:   Lab Results   Component Value Date/Time    CREATININE 0.94 2019 08:03 AM     Bilirubin:   Lab Results   Component Value Date/Time    BILITOT 1.1 2019 08:03 AM     AFP Last 3 each if available:   Lab Results   Component Value Date/Time    AFP 4.3 2019 08:10 AM    AFP 6.1 2019 01:10 PM       MELD: MELD-Na score: 9 at 2019  8:03 AM  MELD score: 9 at 2019  8:03 AM  Calculated from:  Serum Creatinine: 0.94 mg/dL (Rounded to 1 mg/dL) at 2019  8:03 AM  Serum Sodium: 137 mmol/L at 2019  8:03 AM  Total Bilirubin: 1.1 mg/dL at 2019  8:03 AM  INR(ratio): 1.2 at 2019  8:10 AM  Age: 54 years    Plan: 1.1 cm focus of arterial enhnacement at hepatic dome with washout.     COMMITTEE DISCUSSION: Sub-2 cm lesion demonstrating enhancement and washout.  Indeterminate d/t size.  Repeat imaging in 3 months    Note forwarded to CASPER Ortega RN to notify  patient and schedule follow-up.    Follow-up Provider: Soraida Serna MD

## 2019-12-23 NOTE — TELEPHONE ENCOUNTER
Called pt to check if her 24 hr urine collection container had arrived & to discuss IR conference tomorrow.    Pt has her collection container & would like to turn it in Friday @ 1400. Appt booked for labs and container turn-in at the 2nd floor [facility] lab as requested.     Informed pt that there is a suspicious spot on her CT abdomen that the IR conference will review tomorrow. Explained that it could possibly be HCC, and if it is, we will follow protocol to treat/watch, etc. Understanding expressed. No other questions at this time.

## 2019-12-24 ENCOUNTER — CONFERENCE (OUTPATIENT)
Dept: TRANSPLANT | Facility: CLINIC | Age: 54
End: 2019-12-24

## 2019-12-27 ENCOUNTER — LAB VISIT (OUTPATIENT)
Dept: LAB | Facility: HOSPITAL | Age: 54
End: 2019-12-27
Payer: COMMERCIAL

## 2019-12-27 DIAGNOSIS — K70.30 ALCOHOLIC CIRRHOSIS OF LIVER WITHOUT ASCITES: ICD-10-CM

## 2019-12-27 DIAGNOSIS — Z76.82 ORGAN TRANSPLANT CANDIDATE: ICD-10-CM

## 2019-12-27 DIAGNOSIS — R16.0 LIVER MASS: Primary | ICD-10-CM

## 2019-12-27 DIAGNOSIS — K70.31 ALCOHOLIC CIRRHOSIS OF LIVER WITH ASCITES: ICD-10-CM

## 2019-12-27 LAB
ALBUMIN SERPL BCP-MCNC: 4.1 G/DL (ref 3.5–5.2)
ALP SERPL-CCNC: 169 U/L (ref 55–135)
ALT SERPL W/O P-5'-P-CCNC: 35 U/L (ref 10–44)
ANION GAP SERPL CALC-SCNC: 7 MMOL/L (ref 8–16)
AST SERPL-CCNC: 29 U/L (ref 10–40)
BILIRUB SERPL-MCNC: 1.1 MG/DL (ref 0.1–1)
BUN SERPL-MCNC: 14 MG/DL (ref 6–20)
CALCIUM SERPL-MCNC: 10.1 MG/DL (ref 8.7–10.5)
CHLORIDE SERPL-SCNC: 99 MMOL/L (ref 95–110)
CO2 SERPL-SCNC: 31 MMOL/L (ref 23–29)
CREAT CL/1.73 SQ M 12H UR+SERPL-ARVRAT: 68 ML/MIN (ref 70–110)
CREAT SERPL-MCNC: 0.8 MG/DL (ref 0.5–1.4)
CREAT SERPL-MCNC: 0.8 MG/DL (ref 0.5–1.4)
CREAT UR-MCNC: 35 MG/DL (ref 15–325)
CREATININE, URINE (MG/SPEC): 778.8 MG/SPEC
EST. GFR  (AFRICAN AMERICAN): >60 ML/MIN/1.73 M^2
EST. GFR  (NON AFRICAN AMERICAN): >60 ML/MIN/1.73 M^2
GLUCOSE SERPL-MCNC: 76 MG/DL (ref 70–110)
POTASSIUM SERPL-SCNC: 3.7 MMOL/L (ref 3.5–5.1)
PROT SERPL-MCNC: 7.9 G/DL (ref 6–8.4)
SODIUM SERPL-SCNC: 137 MMOL/L (ref 136–145)
URINE COLLECTION DURATION: 24 HR
URINE VOLUME: 2225 ML

## 2019-12-27 PROCEDURE — 80053 COMPREHEN METABOLIC PANEL: CPT | Mod: NTX

## 2019-12-27 PROCEDURE — 36415 COLL VENOUS BLD VENIPUNCTURE: CPT | Mod: NTX

## 2019-12-27 PROCEDURE — 82575 CREATININE CLEARANCE TEST: CPT | Mod: TXP

## 2019-12-27 NOTE — TELEPHONE ENCOUNTER
Spoke to patient about IR conference discussion. Explained that the lesion is small and currently indeterminate, meaning we don't know exactly what it is, and it may be too small to biopsy at this time. Explained that the recommendation is to watch this and repeat imaging in 3 months. Pt expressed understanding and had no other questions at this time. Order placed &  messaged for CT repeat in 3 mo.

## 2020-01-03 LAB
ALBUMIN SERPL-MCNC: 4.9 G/DL (ref 3.5–5.5)
ALBUMIN/GLOB SERPL: 1.9 {RATIO} (ref 1.2–2.2)
ALP SERPL-CCNC: 169 IU/L (ref 39–117)
ALT SERPL-CCNC: 38 IU/L (ref 0–32)
AST SERPL-CCNC: 32 IU/L (ref 0–40)
BILIRUB SERPL-MCNC: 0.9 MG/DL (ref 0–1.2)
BUN SERPL-MCNC: 13 MG/DL (ref 6–24)
BUN/CREAT SERPL: 15 (ref 9–23)
CALCIUM SERPL-MCNC: 9.9 MG/DL (ref 8.7–10.2)
CHLORIDE SERPL-SCNC: 93 MMOL/L (ref 96–106)
CO2 SERPL-SCNC: 24 MMOL/L (ref 20–29)
CREAT SERPL-MCNC: 0.86 MG/DL (ref 0.57–1)
GLOBULIN SER CALC-MCNC: 2.6 G/DL (ref 1.5–4.5)
GLUCOSE SERPL-MCNC: 140 MG/DL (ref 65–99)
POTASSIUM SERPL-SCNC: 3.4 MMOL/L (ref 3.5–5.2)
PROT SERPL-MCNC: 7.5 G/DL (ref 6–8.5)
SODIUM SERPL-SCNC: 135 MMOL/L (ref 134–144)

## 2020-01-13 ENCOUNTER — OFFICE VISIT (OUTPATIENT)
Dept: TRANSPLANT | Facility: CLINIC | Age: 55
End: 2020-01-13
Payer: COMMERCIAL

## 2020-01-13 ENCOUNTER — TELEPHONE (OUTPATIENT)
Dept: TRANSPLANT | Facility: CLINIC | Age: 55
End: 2020-01-13

## 2020-01-13 ENCOUNTER — DOCUMENTATION ONLY (OUTPATIENT)
Dept: TRANSPLANT | Facility: CLINIC | Age: 55
End: 2020-01-13

## 2020-01-13 ENCOUNTER — LAB VISIT (OUTPATIENT)
Dept: LAB | Facility: HOSPITAL | Age: 55
End: 2020-01-13
Attending: INTERNAL MEDICINE
Payer: COMMERCIAL

## 2020-01-13 VITALS
RESPIRATION RATE: 17 BRPM | BODY MASS INDEX: 23.98 KG/M2 | TEMPERATURE: 99 F | DIASTOLIC BLOOD PRESSURE: 82 MMHG | HEART RATE: 104 BPM | SYSTOLIC BLOOD PRESSURE: 138 MMHG | OXYGEN SATURATION: 100 % | HEIGHT: 64 IN | WEIGHT: 140.44 LBS

## 2020-01-13 DIAGNOSIS — K70.30 ALCOHOLIC CIRRHOSIS OF LIVER WITHOUT ASCITES: Primary | ICD-10-CM

## 2020-01-13 DIAGNOSIS — K70.31 ALCOHOLIC CIRRHOSIS OF LIVER WITH ASCITES: ICD-10-CM

## 2020-01-13 DIAGNOSIS — Z76.82 ORGAN TRANSPLANT CANDIDATE: ICD-10-CM

## 2020-01-13 DIAGNOSIS — R18.8 OTHER ASCITES: ICD-10-CM

## 2020-01-13 DIAGNOSIS — G62.9 NEUROPATHY: ICD-10-CM

## 2020-01-13 LAB
ALBUMIN SERPL BCP-MCNC: 4.3 G/DL (ref 3.5–5.2)
ALP SERPL-CCNC: 164 U/L (ref 55–135)
ALT SERPL W/O P-5'-P-CCNC: 35 U/L (ref 10–44)
ANION GAP SERPL CALC-SCNC: 12 MMOL/L (ref 8–16)
AST SERPL-CCNC: 27 U/L (ref 10–40)
BASOPHILS # BLD AUTO: 0.07 K/UL (ref 0–0.2)
BASOPHILS NFR BLD: 0.8 % (ref 0–1.9)
BILIRUB SERPL-MCNC: 1.1 MG/DL (ref 0.1–1)
BUN SERPL-MCNC: 13 MG/DL (ref 6–20)
CALCIUM SERPL-MCNC: 10.1 MG/DL (ref 8.7–10.5)
CHLORIDE SERPL-SCNC: 91 MMOL/L (ref 95–110)
CO2 SERPL-SCNC: 28 MMOL/L (ref 23–29)
CREAT SERPL-MCNC: 1.2 MG/DL (ref 0.5–1.4)
DIFFERENTIAL METHOD: ABNORMAL
EOSINOPHIL # BLD AUTO: 0.5 K/UL (ref 0–0.5)
EOSINOPHIL NFR BLD: 5.4 % (ref 0–8)
ERYTHROCYTE [DISTWIDTH] IN BLOOD BY AUTOMATED COUNT: 13.2 % (ref 11.5–14.5)
EST. GFR  (AFRICAN AMERICAN): 59.2 ML/MIN/1.73 M^2
EST. GFR  (NON AFRICAN AMERICAN): 51.4 ML/MIN/1.73 M^2
GLUCOSE SERPL-MCNC: 311 MG/DL (ref 70–110)
HCT VFR BLD AUTO: 40.2 % (ref 37–48.5)
HGB BLD-MCNC: 13.2 G/DL (ref 12–16)
IMM GRANULOCYTES # BLD AUTO: 0.05 K/UL (ref 0–0.04)
IMM GRANULOCYTES NFR BLD AUTO: 0.5 % (ref 0–0.5)
INR PPP: 1.2 (ref 0.8–1.2)
LYMPHOCYTES # BLD AUTO: 2 K/UL (ref 1–4.8)
LYMPHOCYTES NFR BLD: 21.5 % (ref 18–48)
MCH RBC QN AUTO: 31.7 PG (ref 27–31)
MCHC RBC AUTO-ENTMCNC: 32.8 G/DL (ref 32–36)
MCV RBC AUTO: 97 FL (ref 82–98)
MONOCYTES # BLD AUTO: 0.6 K/UL (ref 0.3–1)
MONOCYTES NFR BLD: 6.1 % (ref 4–15)
NEUTROPHILS # BLD AUTO: 6.1 K/UL (ref 1.8–7.7)
NEUTROPHILS NFR BLD: 65.7 % (ref 38–73)
NRBC BLD-RTO: 0 /100 WBC
PLATELET # BLD AUTO: 204 K/UL (ref 150–350)
PMV BLD AUTO: 9.8 FL (ref 9.2–12.9)
POTASSIUM SERPL-SCNC: 3.6 MMOL/L (ref 3.5–5.1)
PROT SERPL-MCNC: 8.1 G/DL (ref 6–8.4)
PROTHROMBIN TIME: 11.8 SEC (ref 9–12.5)
RBC # BLD AUTO: 4.16 M/UL (ref 4–5.4)
SODIUM SERPL-SCNC: 131 MMOL/L (ref 136–145)
WBC # BLD AUTO: 9.23 K/UL (ref 3.9–12.7)

## 2020-01-13 PROCEDURE — 85025 COMPLETE CBC W/AUTO DIFF WBC: CPT | Mod: TXP

## 2020-01-13 PROCEDURE — 36415 COLL VENOUS BLD VENIPUNCTURE: CPT | Mod: PN,TXP

## 2020-01-13 PROCEDURE — 80321 ALCOHOLS BIOMARKERS 1OR 2: CPT | Mod: TXP

## 2020-01-13 PROCEDURE — 99999 PR PBB SHADOW E&M-EST. PATIENT-LVL III: ICD-10-PCS | Mod: PBBFAC,TXP,, | Performed by: INTERNAL MEDICINE

## 2020-01-13 PROCEDURE — 99999 PR PBB SHADOW E&M-EST. PATIENT-LVL III: CPT | Mod: PBBFAC,TXP,, | Performed by: INTERNAL MEDICINE

## 2020-01-13 PROCEDURE — 99215 PR OFFICE/OUTPT VISIT, EST, LEVL V, 40-54 MIN: ICD-10-PCS | Mod: S$GLB,TXP,, | Performed by: INTERNAL MEDICINE

## 2020-01-13 PROCEDURE — 80053 COMPREHEN METABOLIC PANEL: CPT | Mod: TXP

## 2020-01-13 PROCEDURE — 99215 OFFICE O/P EST HI 40 MIN: CPT | Mod: S$GLB,TXP,, | Performed by: INTERNAL MEDICINE

## 2020-01-13 PROCEDURE — 85610 PROTHROMBIN TIME: CPT | Mod: TXP

## 2020-01-13 RX ORDER — GABAPENTIN 300 MG/1
600 CAPSULE ORAL NIGHTLY
Qty: 60 CAPSULE | Refills: 11 | Status: SHIPPED | OUTPATIENT
Start: 2020-01-13 | End: 2020-02-12

## 2020-01-13 RX ORDER — FOLIC ACID 1 MG/1
1 TABLET ORAL DAILY
Qty: 30 TABLET | Refills: 11 | Status: SHIPPED | OUTPATIENT
Start: 2020-01-13 | End: 2021-01-10

## 2020-01-13 NOTE — Clinical Note
1 increase gabapentin to 600 mg at [facility]. After one week can increase to 900 mg at [facility] if needed.2. Start folic acid3. Repeat ct in 3 months to f/u on liver lesions4. Monthly labsReturn 3 months

## 2020-01-13 NOTE — PATIENT INSTRUCTIONS
1 increase gabapentin to 600 mg at [facility]. After one week can increase to 900 mg at [facility] if needed.  2. Start folic acid  3. Repeat ct in 3 months to f/u on liver lesions  4. Monthly labs  Return 3 months

## 2020-01-13 NOTE — LETTER
January 15, 2020        Silviano Clark  [address] [facility] [address]  [address]  [facility]  [facility] [address]  Phone: [phone]  Fax: [phone]             [place] [facility] Transplant  [address] [address]  Phone: [phone]  Fax: [phone]   Patient: Brunilda Delgadillo   MR Number: [id number]   YOB: 1965   Date of Visit: 1/13/2020       Dear Dr. Silviano Clark    Thank you for referring Brunilda Delgadillo to me for evaluation. Attached you will find relevant portions of my assessment and plan of care.    If you have questions, please do not hesitate to call me. I look forward to following Brunilda Delgadillo along with you.    Sincerely,    Soraida Serna MD    Enclosure    If you would like to receive this communication electronically, please contact [email or web address] or [phone] to request [employer] access.    [employer] is a tool which provides read-only access to select patient information with whom you have a relationship. Its easy to use and provides real time access to review your patients record including encounter summaries, notes, results, and demographic information.    If you feel you have received this communication in error or would no longer like to receive these types of communications, please e-mail [email or web address]

## 2020-01-13 NOTE — TELEPHONE ENCOUNTER
Called pt to follow up on liver transplant evaluation work-up. Pt needs repeat EGD per Dr. Serna on 10/24/19 & conversation with MARRY Dill RN on 12/3/19. Team also needs pt's Gyn/Pap records for her most recent visit.     No answer. Left detailed VM with request for a call back and left pt the number for endoscopy so that she can schedule her repeat EGD.

## 2020-01-13 NOTE — PROGRESS NOTES
HEPATOLOGY FOLLOW UP    Referring Physician: Brett Michele MD  Current Corresponding Physician: Brett Michele MD    Brunilda Delgadillo is here for follow up of Cirrhosis  She is a 54 y.o. 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 She has a 14 and 17 year old who live at home.     --pt has drank alcohol most of her adult life; last drink ~1 month ago  --developed ascites June 2019 and has had 3 paracenteses (last one month ago)  --lasix on hold due to hyponatremia- as low as 119.  --pt is eating low salt diet  --no HE  --PHG but no varices on EGD 11/16     Labs 6/18/19: plts 313, INR 1.4, , Na 125, creat 0.8, Na 119   MELD-Na score: 10 at 8/21/2019  1:10 PM  MELD score: 10 at 8/21/2019  1:10 PM  Calculated from:  Serum Creatinine: 0.8 mg/dL (Rounded to 1 mg/dL) at 8/21/2019  1:10 PM  Serum Sodium: 129 mmol/L at 8/21/2019  1:10 PM  Total Bilirubin: 1.7 mg/dL at 8/21/2019  1:10 PM  INR(ratio): 1.2 at 8/21/2019  1:10 PM  Age: 54 years    Interval History  Since Brunilda Delgadillo's last visit she has been doing quite well. She continues on spironolactone, lasix and metolazone. Her last LVP was 3.8 L on 10/21/19 (LVPs (8/29/19, 9/12/19, 10/21/19). She has no symptoms of HE.    She is undergoing a liver transplant evaluation for large ascites and hyponatremia.     MELD-Na score: 9 at 12/14/2019  8:03 AM  MELD score: 9 at 12/14/2019  8:03 AM  Calculated from:  Serum Creatinine: 0.94 mg/dL (Rounded to 1 mg/dL) at 12/14/2019  8:03 AM  Serum Sodium: 137 mmol/L at 12/14/2019  8:03 AM  Total Bilirubin: 1.1 mg/dL at 12/14/2019  8:03 AM  INR(ratio): 1.2 at 12/12/2019  8:10 AM  Age: 54 years    Outpatient Encounter Medications as of 1/13/2020   Medication Sig Dispense Refill    DULoxetine (CYMBALTA) 60 MG capsule Take 60 mg by mouth once daily.       furosemide (LASIX) 40 MG tablet Take 0.5 tablets (20 mg total) by mouth once daily. 15 tablet 11    gabapentin  "(NEURONTIN) 300 MG capsule Take 1 capsule (300 mg total) by mouth every evening. 30 capsule 11    insulin detemir (LEVEMIR U-100 INSULIN SUBQ) Inject 40 Units into the skin once daily.      metOLazone (ZAROXOLYN) 2.5 MG tablet Take every other day 30 min prior to other diuretics 30 tablet 11    multivit with minerals/lutein (MULTIVITAMIN 50 PLUS ORAL) Take by mouth.      pen needle, diabetic 30 gauge x 1/3" Ndle NovoFine 30 30 gauge x 1/3" needle      spironolactone (ALDACTONE) 100 MG tablet Take 2 tablets (200 mg total) by mouth once daily. 30 tablet 11    thiamine (VITAMIN B-1) 100 MG tablet Take 100 mg by mouth.       No facility-administered encounter medications on file as of 1/13/2020.      Review of patient's allergies indicates:   Allergen Reactions    Penicillins      Rash as a child     Past Medical History:   Diagnosis Date    Anxiety disorder 8/21/2019    Ascites 8/21/2019    Cirrhosis     Depression 8/21/2019    Diabetes mellitus     History of cellulitis 8/21/2019    Hyponatremia 8/21/2019    Portal hypertensive gastropathy 8/21/2019    On EGD 11/16    Type 2 diabetes mellitus 8/21/2019       Review of Systems   Constitutional: Negative.    HENT: Negative.    Eyes: Negative.    Respiratory: Negative.    Cardiovascular: Negative.    Gastrointestinal: Negative.    Genitourinary: Negative.    Musculoskeletal: Negative.    Skin: Negative.    Neurological: Negative.    Psychiatric/Behavioral: Negative.      Vitals:    01/13/20 1436   BP: 138/82   Pulse: 104   Resp: 17   Temp: 99 °F (37.2 °C)       Physical Exam   Constitutional: She is oriented to person, place, and time. She appears well-developed and well-nourished.   HENT:   Head: Normocephalic.   Eyes: Pupils are equal, round, and reactive to light. No scleral icterus.   Neck: Neck supple. No thyromegaly present.   Cardiovascular: Normal rate, regular rhythm and normal heart sounds.   Pulmonary/Chest: Effort normal and breath sounds " normal. She has no wheezes.   Abdominal: Soft. She exhibits no distension and no mass. There is no tenderness.   Musculoskeletal: Normal range of motion. She exhibits no edema.   Neurological: She is oriented to person, place, and time.   Skin: Skin is warm and dry. No rash noted.   Psychiatric: She has a normal mood and affect.   Vitals reviewed.      Lab Results   Component Value Date     (H) 01/02/2020    BUN 13 01/02/2020    CREATININE 0.86 01/02/2020    CALCIUM 9.9 01/02/2020     01/02/2020    K 3.4 (L) 01/02/2020    CL 93 (L) 01/02/2020    PROT 7.5 01/02/2020    CO2 24 01/02/2020    ANIONGAP 7 (L) 12/27/2019    WBC 10.08 12/12/2019    RBC 4.28 12/12/2019    HGB 13.3 12/12/2019    HCT 40.3 12/12/2019    MCV 94 12/12/2019    MCH 31.1 (H) 12/12/2019    MCHC 33.0 12/12/2019     Lab Results   Component Value Date    RDW 13.0 12/12/2019     12/12/2019    MPV 9.8 12/12/2019    GRAN 6.8 12/12/2019    GRAN 67.5 12/12/2019    LYMPH 2.2 12/12/2019    LYMPH 21.5 12/12/2019    MONO 0.5 12/12/2019    MONO 5.2 12/12/2019    EOSINOPHIL 4.9 12/12/2019    BASOPHIL 0.6 12/12/2019    EOS 0.5 12/12/2019    BASO 0.06 12/12/2019    GROUPTRH O POS 12/12/2019    ALBUMIN 4.9 01/02/2020    BILIDIR 0.7 (H) 12/12/2019    AST 32 01/02/2020    ALT 38 (H) 01/02/2020    ALKPHOS 169 (H) 01/02/2020    LABPROT 12.2 12/12/2019    INR 1.2 12/12/2019   MELD-Na score: 9 at 12/14/2019  8:03 AM  MELD score: 9 at 12/14/2019  8:03 AM  Calculated from:  Serum Creatinine: 0.94 mg/dL (Rounded to 1 mg/dL) at 12/14/2019  8:03 AM  Serum Sodium: 137 mmol/L at 12/14/2019  8:03 AM  Total Bilirubin: 1.1 mg/dL at 12/14/2019  8:03 AM  INR(ratio): 1.2 at 12/12/2019  8:10 AM  Age: 54 years  Assessment and Plan:    Brunilda Delgadillo is a 54 y.o. female withCirrhosis  Current recommendations:  1. Ascites, ongoing, but may be improving with abstinence from alcohol. Meld low. May consider tips in the future if large fluid continues  2. PHG, recommend  repeat EGD  3. Cirrhosis, decompensated: copmlete liver tx evaluation  4. Alcohol abuse, with <6 months sobriety: addiciton psych to assess; pt remains not interested in rehab  5. DM, ongoing: monitor    7. Gabapentin at night for nerve pain  Return 3 months    . .

## 2020-01-15 DIAGNOSIS — R16.0 LIVER MASS: ICD-10-CM

## 2020-01-15 DIAGNOSIS — K70.31 ALCOHOLIC CIRRHOSIS OF LIVER WITH ASCITES: ICD-10-CM

## 2020-01-15 DIAGNOSIS — Z76.82 ORGAN TRANSPLANT CANDIDATE: ICD-10-CM

## 2020-01-15 DIAGNOSIS — K70.30 ALCOHOLIC CIRRHOSIS OF LIVER WITHOUT ASCITES: Primary | ICD-10-CM

## 2020-01-15 NOTE — PROGRESS NOTES
3. Repeat ct in 3 months to f/u on liver lesions - order placed  4. Monthly labs - pt's lab has orders  Return 3 months -  messaged for RTC in 3 months with CT, PEth & MELD labs

## 2020-01-22 DIAGNOSIS — K74.60 HEPATIC CIRRHOSIS, UNSPECIFIED HEPATIC CIRRHOSIS TYPE, UNSPECIFIED WHETHER ASCITES PRESENT: Primary | ICD-10-CM

## 2020-01-22 LAB — PHOSPHATIDYLETHANOL (PETH): NEGATIVE NG/ML

## 2020-01-27 ENCOUNTER — TELEPHONE (OUTPATIENT)
Dept: TRANSPLANT | Facility: CLINIC | Age: 55
End: 2020-01-27

## 2020-01-27 NOTE — TELEPHONE ENCOUNTER
Milagro BEATTY [facility] Pre-Liver Transplant Clinical   Caller: Unspecified (Today,  2:44 PM)             Pt is calling to speak to coordinator she stated she has a couple of questions       Pt contact [phone]      _______________________________________    Pt is calling to ask if she can go up on her Gabapentin. She currently takes 2 capsules, 600 mg total for diabetic foot neuropathy / pain.  Will send message to provider.

## 2020-01-28 ENCOUNTER — PATIENT MESSAGE (OUTPATIENT)
Dept: TRANSPLANT | Facility: CLINIC | Age: 55
End: 2020-01-28

## 2020-02-12 ENCOUNTER — TELEPHONE (OUTPATIENT)
Dept: TRANSPLANT | Facility: CLINIC | Age: 55
End: 2020-02-12

## 2020-02-12 ENCOUNTER — PATIENT MESSAGE (OUTPATIENT)
Dept: TRANSPLANT | Facility: CLINIC | Age: 55
End: 2020-02-12

## 2020-02-12 ENCOUNTER — ANESTHESIA (OUTPATIENT)
Dept: ENDOSCOPY | Facility: HOSPITAL | Age: 55
End: 2020-02-12
Payer: COMMERCIAL

## 2020-02-12 ENCOUNTER — HOSPITAL ENCOUNTER (OUTPATIENT)
Facility: HOSPITAL | Age: 55
Discharge: HOME | End: 2020-02-12
Attending: INTERNAL MEDICINE | Admitting: INTERNAL MEDICINE
Payer: COMMERCIAL

## 2020-02-12 ENCOUNTER — ANESTHESIA EVENT (OUTPATIENT)
Dept: ENDOSCOPY | Facility: HOSPITAL | Age: 55
End: 2020-02-12
Payer: COMMERCIAL

## 2020-02-12 VITALS
HEIGHT: 64 IN | BODY MASS INDEX: 24.59 KG/M2 | WEIGHT: 144 LBS | DIASTOLIC BLOOD PRESSURE: 76 MMHG | TEMPERATURE: 98 F | SYSTOLIC BLOOD PRESSURE: 127 MMHG | OXYGEN SATURATION: 100 % | RESPIRATION RATE: 18 BRPM | HEART RATE: 86 BPM

## 2020-02-12 DIAGNOSIS — K70.30 ALCOHOLIC CIRRHOSIS OF LIVER WITHOUT ASCITES: Primary | ICD-10-CM

## 2020-02-12 DIAGNOSIS — G62.9 NEUROPATHY: ICD-10-CM

## 2020-02-12 DIAGNOSIS — R18.8 OTHER ASCITES: Primary | ICD-10-CM

## 2020-02-12 DIAGNOSIS — I85.00 ESOPHAGEAL VARICES: ICD-10-CM

## 2020-02-12 DIAGNOSIS — Z76.82 ORGAN TRANSPLANT CANDIDATE: ICD-10-CM

## 2020-02-12 LAB — POCT GLUCOSE: 92 MG/DL (ref 70–110)

## 2020-02-12 PROCEDURE — 43235 PR EGD, FLEX, DIAGNOSTIC: ICD-10-PCS | Mod: TXP,,, | Performed by: INTERNAL MEDICINE

## 2020-02-12 PROCEDURE — 43235 EGD DIAGNOSTIC BRUSH WASH: CPT | Mod: TXP,,, | Performed by: INTERNAL MEDICINE

## 2020-02-12 PROCEDURE — E9220 PRA ENDO ANESTHESIA: HCPCS | Mod: TXP,,, | Performed by: NURSE ANESTHETIST, CERTIFIED REGISTERED

## 2020-02-12 PROCEDURE — 63600175 PHARM REV CODE 636 W HCPCS: Mod: TXP | Performed by: NURSE ANESTHETIST, CERTIFIED REGISTERED

## 2020-02-12 PROCEDURE — 43235 EGD DIAGNOSTIC BRUSH WASH: CPT | Mod: TXP | Performed by: INTERNAL MEDICINE

## 2020-02-12 PROCEDURE — 63600175 PHARM REV CODE 636 W HCPCS: Mod: TXP | Performed by: INTERNAL MEDICINE

## 2020-02-12 PROCEDURE — E9220 PRA ENDO ANESTHESIA: ICD-10-PCS | Mod: TXP,,, | Performed by: NURSE ANESTHETIST, CERTIFIED REGISTERED

## 2020-02-12 PROCEDURE — 82962 GLUCOSE BLOOD TEST: CPT | Mod: TXP | Performed by: INTERNAL MEDICINE

## 2020-02-12 PROCEDURE — 37000009 HC ANESTHESIA EA ADD 15 MINS: Mod: TXP | Performed by: INTERNAL MEDICINE

## 2020-02-12 PROCEDURE — 37000008 HC ANESTHESIA 1ST 15 MINUTES: Mod: TXP | Performed by: INTERNAL MEDICINE

## 2020-02-12 RX ORDER — PROPOFOL 10 MG/ML
VIAL (ML) INTRAVENOUS CONTINUOUS PRN
Status: DISCONTINUED | OUTPATIENT
Start: 2020-02-12 | End: 2020-02-12

## 2020-02-12 RX ORDER — SODIUM CHLORIDE 9 MG/ML
INJECTION, SOLUTION INTRAVENOUS CONTINUOUS
Status: DISCONTINUED | OUTPATIENT
Start: 2020-02-12 | End: 2020-02-12 | Stop reason: HOSPADM

## 2020-02-12 RX ORDER — LIDOCAINE HCL/PF 100 MG/5ML
SYRINGE (ML) INTRAVENOUS
Status: DISCONTINUED | OUTPATIENT
Start: 2020-02-12 | End: 2020-02-12

## 2020-02-12 RX ORDER — PROPOFOL 10 MG/ML
VIAL (ML) INTRAVENOUS
Status: DISCONTINUED | OUTPATIENT
Start: 2020-02-12 | End: 2020-02-12

## 2020-02-12 RX ADMIN — PROPOFOL 80 MG: 10 INJECTION, EMULSION INTRAVENOUS at 03:02

## 2020-02-12 RX ADMIN — PROPOFOL 20 MG: 10 INJECTION, EMULSION INTRAVENOUS at 03:02

## 2020-02-12 RX ADMIN — Medication 60 MG: at 03:02

## 2020-02-12 RX ADMIN — PROPOFOL 150 MCG/KG/MIN: 10 INJECTION, EMULSION INTRAVENOUS at 03:02

## 2020-02-12 RX ADMIN — SODIUM CHLORIDE: 0.9 INJECTION, SOLUTION INTRAVENOUS at 02:02

## 2020-02-12 NOTE — TELEPHONE ENCOUNTER
----- Message from Kathy Fajardo sent at 2/12/2020  4:24 PM CST -----  Contact: Pt  Pt was returning phone call.    Pt#[phone]

## 2020-02-12 NOTE — ANESTHESIA PREPROCEDURE EVALUATION
02/12/2020  Brunilda Delgadillo is a 54 y.o., female.    Anesthesia Evaluation    I have reviewed the Patient Summary Reports.    I have reviewed the Nursing Notes.   I have reviewed the Medications.     Review of Systems  Anesthesia Hx:  No problems with previous Anesthesia  History of prior surgery of interest to airway management or planning: Previous anesthesia: General Denies Family Hx of Anesthesia complications.   Denies Personal Hx of Anesthesia complications.   Social:  Non-Smoker    Hematology/Oncology:  Hematology Normal   Oncology Normal     EENT/Dental:EENT/Dental Normal   Cardiovascular:  Cardiovascular Normal Exercise tolerance: good     Pulmonary:  Pulmonary Normal    Renal/:  Renal/ Normal     Hepatic/GI:   Liver Disease, Alcoholic cirrhosis; ascites   Musculoskeletal:  Musculoskeletal Normal    Neurological:  Neurology Normal    Endocrine:   Diabetes, type 2    Dermatological:  Skin Normal    Psych:   Psychiatric History anxiety          Physical Exam  General:  Well nourished    Airway/Jaw/Neck:  Airway Findings: Mouth Opening: Normal Tongue: Normal  General Airway Assessment: Adult, Average  Mallampati: II  Improves to II with phonation.  TM Distance: Normal, at least 6 cm        Eyes/Ears/Nose:  EYES/EARS/NOSE FINDINGS: Normal   Dental:  Dental Findings: In tact   Chest/Lungs:  Chest/Lungs Findings: Clear to auscultation, Normal Respiratory Rate     Heart/Vascular:  Heart Findings: Rate: Normal  Rhythm: Regular Rhythm  Sounds: Normal  Heart murmur: negative Vascular Findings: Normal    Abdomen:  Abdomen Findings: Normal    Musculoskeletal:  Musculoskeletal Findings: Normal   Skin:  Skin Findings: Normal    Mental Status:  Mental Status Findings:  Cooperative, Alert and Oriented         Anesthesia Plan  Type of Anesthesia, risks & benefits discussed:  Anesthesia Type:  general  Patient's  Preference:   Intra-op Monitoring Plan: standard ASA monitors  Intra-op Monitoring Plan Comments:   Post Op Pain Control Plan:   Post Op Pain Control Plan Comments:   Induction:   IV  Beta Blocker:  Patient is not currently on a Beta-Blocker (No further documentation required).       Informed Consent: Patient understands risks and agrees with Anesthesia plan.  Questions answered. Anesthesia consent signed with patient.  ASA Score: 3     Day of Surgery Review of History & Physical:            Ready For Surgery From Anesthesia Perspective.

## 2020-02-12 NOTE — [employer] PATIENT INSTRUCTIONS
Discharge Summary/Instructions after an Endoscopic Procedure  Patient Name: Brunilda Delgadillo  Patient MRN: [id number]  Patient YOB: 1965 Wednesday, February 12, 2020  Ayden Bernal MD  RESTRICTIONS:  During your procedure today, you received medications for sedation.  These   medications may affect your judgment, balance and coordination.  Therefore,   for 24 hours, you have the following restrictions:   - DO NOT drive a car, operate machinery, make legal/financial decisions,   sign important papers or drink alcohol.    ACTIVITY:  Today: no heavy lifting, straining or running due to procedural   sedation/anesthesia.  The following day: return to full activity including work.  DIET:  Eat and drink normally unless instructed otherwise.     TREATMENT FOR COMMON SIDE EFFECTS:  - Mild abdominal pain, nausea, belching, bloating or excessive gas:  rest,   eat lightly and use a heating pad.  - Sore Throat: treat with throat lozenges and/or gargle with warm salt   water.  - Because air was used during the procedure, expelling large amounts of air   from your rectum or belching is normal.  - If a bowel prep was taken, you may not have a bowel movement for 1-3 days.    This is normal.  SYMPTOMS TO WATCH FOR AND REPORT TO YOUR PHYSICIAN:  1. Abdominal pain or bloating, other than gas cramps.  2. Chest pain.  3. Back pain.  4. Signs of infection such as: chills or fever occurring within 24 hours   after the procedure.  5. Rectal bleeding, which would show as bright red, maroon, or black stools.   (A tablespoon of blood from the rectum is not serious, especially if   hemorrhoids are present.)  6. Vomiting.  7. Weakness or dizziness.  GO DIRECTLY TO THE NEAREST EMERGENCY ROOM IF YOU HAVE ANY OF THE FOLLOWING:      Difficulty breathing              Chills and/or fever over 101 F   Persistent vomiting and/or vomiting blood   Severe abdominal pain   Severe chest pain   Black, tarry stools   Bleeding- more than one tablespoon   Any  other symptom or condition that you feel may need urgent attention  Your doctor recommends these additional instructions:  If any biopsies were taken, your doctors clinic will contact you in 1 to 2   weeks with any results.  - Patient has a contact number available for emergencies.  The signs and   symptoms of potential delayed complications were discussed with the   patient.  Return to normal activities tomorrow.  Written discharge   instructions were provided to the patient.   - Discharge patient to home.   - Repeat upper endoscopy in 2 years for surveillance.   - The findings and recommendations were discussed with the designated   responsible adult.  For questions, problems or results please call your physician - Ayden Bernal MD at Work:  [phone].  [facility], EMERGENCY ROOM PHONE NUMBER: [phone]  IF A COMPLICATION OR EMERGENCY SITUATION ARISES AND YOU ARE UNABLE TO REACH   YOUR PHYSICIAN - GO DIRECTLY TO THE EMERGENCY ROOM.  Ayden Bernal MD  2/12/2020 3:22:35 PM  This report has been verified and signed electronically.  [employer]

## 2020-02-12 NOTE — TRANSFER OF CARE
"Anesthesia Transfer of Care Note    Patient: Brunilda Delgadillo    Procedure(s) Performed: Procedure(s) (LRB):  EGD (ESOPHAGOGASTRODUODENOSCOPY) (N/A)    Patient location: PACU    Anesthesia Type: general    Transport from OR: Transported from OR on 2-3 L/min O2 by NC with adequate spontaneous ventilation    Post pain: adequate analgesia    Post assessment: no apparent anesthetic complications    Post vital signs: stable    Level of consciousness: responds to stimulation    Nausea/Vomiting: no nausea/vomiting    Complications: none    Transfer of care protocol was followed      Last vitals:   Visit Vitals  /64   Pulse 81   Temp 36.8 °C (98.2 °F)   Resp 15   Ht 5' 4" (1.626 m)   Wt 65.3 kg (144 lb)   LMP  (LMP Unknown)   SpO2 100%   Breastfeeding? No   BMI 24.72 kg/m²     "

## 2020-02-12 NOTE — ANESTHESIA POSTPROCEDURE EVALUATION
Anesthesia Post Evaluation    Patient: Brunilda Delgadillo    Procedure(s) Performed: Procedure(s) (LRB):  EGD (ESOPHAGOGASTRODUODENOSCOPY) (N/A)    Final Anesthesia Type: general    Patient location during evaluation: GI PACU  Patient participation: Yes- Able to Participate  Level of consciousness: awake and alert and oriented  Post-procedure vital signs: reviewed and stable  Pain management: adequate  Airway patency: patent    PONV status at discharge: No PONV  Anesthetic complications: no      Cardiovascular status: hemodynamically stable  Respiratory status: unassisted, spontaneous ventilation and room air  Hydration status: euvolemic  Follow-up not needed.          Vitals Value Taken Time   /76 2/12/2020  3:52 PM   Temp 36.8 °C (98.2 °F) 2/12/2020  3:25 PM   Pulse 86 2/12/2020  3:52 PM   Resp 18 2/12/2020  3:52 PM   SpO2 100 % 2/12/2020  3:52 PM         Event Time     Out of Recovery 15:59:59          Pain/Andrea Score: Andrea Score: 10 (2/12/2020  3:38 PM)

## 2020-02-12 NOTE — PLAN OF CARE
ID applied and verified. Plan of care initiated with Brunilda Delgadillo. Understanding verbalized.

## 2020-02-12 NOTE — DISCHARGE INSTRUCTIONS

## 2020-02-13 ENCOUNTER — TELEPHONE (OUTPATIENT)
Dept: TRANSPLANT | Facility: CLINIC | Age: 55
End: 2020-02-13

## 2020-02-13 DIAGNOSIS — Z01.818 PRE-TRANSPLANT EVALUATION FOR LIVER TRANSPLANT: ICD-10-CM

## 2020-02-13 DIAGNOSIS — Z76.82 ORGAN TRANSPLANT CANDIDATE: Primary | ICD-10-CM

## 2020-02-13 RX ORDER — GABAPENTIN 300 MG/1
CAPSULE ORAL
Qty: 90 CAPSULE | Refills: 11 | Status: SHIPPED | OUTPATIENT
Start: 2020-02-13 | End: 2020-02-24

## 2020-02-13 NOTE — TELEPHONE ENCOUNTER
Patient requesting Para - discussed with Dr Haro.  Scheduled Para on 2/20 at 8:00am.  Patient stated that works for her and will be there.  Advised to check in on 2nd floor radiology 15-30 minutes before the appointment.  Rescheduled missed lab appointment to be done same day after her para.  Patient advised about this.

## 2020-02-18 ENCOUNTER — TELEPHONE (OUTPATIENT)
Dept: TRANSPLANT | Facility: CLINIC | Age: 55
End: 2020-02-18

## 2020-02-18 DIAGNOSIS — K70.30 ALCOHOLIC CIRRHOSIS OF LIVER WITHOUT ASCITES: ICD-10-CM

## 2020-02-18 DIAGNOSIS — R16.0 LIVER MASS: ICD-10-CM

## 2020-02-18 DIAGNOSIS — Z76.82 ORGAN TRANSPLANT CANDIDATE: Primary | ICD-10-CM

## 2020-02-18 NOTE — TELEPHONE ENCOUNTER
----- Message from Trupti Schroeder sent at 2/18/2020  4:53 PM CST -----    Called and sp to pt; she is aurelia'ed for 4/8 w/[facility]     ----- Message -----  From: Rickey Vizcaino RN  Sent: 2/18/2020   9:12 AM CST  To: Trupti Schroeder    Orders placed under [facility] now :)  ----- Message -----  From: Trupti Schroeder  Sent: 2/18/2020   7:28 AM CST  To: Rickey Vizcaino RN    Need orders in under [facility]    =================================================    4/2020 WITH [id number]   Due: In 1 month   Received: 3 weeks ago   Message Contents   Trupti Schroeder sent to Trupti Schroeder         RTC   Received: 1 week ago   Message Contents   Rickey Vizcaino RN sent to Trupti Schroeder           RTC with CT, PEth & MELD labs   Jacintaedavion in 3 months   Orders in

## 2020-02-19 ENCOUNTER — TELEPHONE (OUTPATIENT)
Dept: ENDOSCOPY | Facility: HOSPITAL | Age: 55
End: 2020-02-19

## 2020-02-20 ENCOUNTER — HOSPITAL ENCOUNTER (OUTPATIENT)
Dept: INTERVENTIONAL RADIOLOGY/VASCULAR | Facility: HOSPITAL | Age: 55
Discharge: HOME | End: 2020-02-20
Attending: INTERNAL MEDICINE
Payer: COMMERCIAL

## 2020-02-20 ENCOUNTER — CLINICAL SUPPORT (OUTPATIENT)
Dept: INFECTIOUS DISEASES | Facility: CLINIC | Age: 55
End: 2020-02-20
Payer: COMMERCIAL

## 2020-02-20 VITALS
HEART RATE: 83 BPM | OXYGEN SATURATION: 100 % | RESPIRATION RATE: 18 BRPM | SYSTOLIC BLOOD PRESSURE: 120 MMHG | DIASTOLIC BLOOD PRESSURE: 75 MMHG

## 2020-02-20 DIAGNOSIS — Z76.82 ORGAN TRANSPLANT CANDIDATE: ICD-10-CM

## 2020-02-20 DIAGNOSIS — R18.8 OTHER ASCITES: ICD-10-CM

## 2020-02-20 PROCEDURE — 90471 IMMUNIZATION ADMIN: CPT | Mod: S$GLB,TXP,, | Performed by: INTERNAL MEDICINE

## 2020-02-20 PROCEDURE — 90739 HEPB VACC 2/4 DOSE ADULT IM: CPT | Mod: S$GLB,TXP,, | Performed by: INTERNAL MEDICINE

## 2020-02-20 PROCEDURE — 76705 IR US ABDOMEN LIMITED: ICD-10-PCS | Mod: 26,TXP,, | Performed by: RADIOLOGY

## 2020-02-20 PROCEDURE — 76705 ECHO EXAM OF ABDOMEN: CPT | Mod: 26,TXP,, | Performed by: RADIOLOGY

## 2020-02-20 PROCEDURE — 90739 HEPATITIS B (RECOMBINANT) ADJUVANTED, 2 DOSE: ICD-10-PCS | Mod: S$GLB,TXP,, | Performed by: INTERNAL MEDICINE

## 2020-02-20 PROCEDURE — 76705 ECHO EXAM OF ABDOMEN: CPT | Mod: TC,NTX

## 2020-02-20 PROCEDURE — 90471 PR IMMUNIZ ADMIN,1 SINGLE/COMB VAC/TOXOID: ICD-10-PCS | Mod: S$GLB,TXP,, | Performed by: INTERNAL MEDICINE

## 2020-02-20 NOTE — PROGRESS NOTES
"Pt arrived to room MPU for paracentesis . Pt awake, alert, and oriented. Awaiting consent. States has not had para since Oct and has been eating "a lot of salt".  Allergies reviewed, patient ID'd using 2 identifiers.          "

## 2020-02-24 ENCOUNTER — TELEPHONE (OUTPATIENT)
Dept: TRANSPLANT | Facility: CLINIC | Age: 55
End: 2020-02-24

## 2020-02-24 DIAGNOSIS — G62.9 NEUROPATHY: ICD-10-CM

## 2020-02-24 RX ORDER — GABAPENTIN 300 MG/1
CAPSULE ORAL
Qty: 90 CAPSULE | Refills: 11 | Status: SHIPPED | OUTPATIENT
Start: 2020-02-24 | End: 2021-03-08

## 2020-02-24 NOTE — TELEPHONE ENCOUNTER
----- Message from Rachel Mckee sent at 2/24/2020  1:45 PM CST -----  Contact: pt  Reason: Calling to speak with coordinator pertaining to dosage of gabapentin (NEURONTIN) 300 MG capsule    Communication: [phone]

## 2020-02-24 NOTE — TELEPHONE ENCOUNTER
Patient almost out of gabapentin. Needs new prescription reflecting increased dose. Prescription routed to hepatologist for 900mg per Dr. Serna's chart recommendation.

## 2020-02-28 ENCOUNTER — TELEPHONE (OUTPATIENT)
Dept: TRANSPLANT | Facility: CLINIC | Age: 55
End: 2020-02-28

## 2020-02-28 NOTE — TELEPHONE ENCOUNTER
Called patient to inform her that we would discuss/present her case on 3/4/2020. Patient still working on a GYN consult. States that her GYN dr moved, and she is in the process on getting an appt with a new one. Explained that this may or may not hold up her listing d/t needing this documentation for clinical submission to insurance companies most of the time, depending on her ins. Understanding expressed. Pt to update team as soon as she is scheduled.

## 2020-03-02 ENCOUNTER — TELEPHONE (OUTPATIENT)
Dept: TRANSPLANT | Facility: CLINIC | Age: 55
End: 2020-03-02

## 2020-03-04 ENCOUNTER — COMMITTEE REVIEW (OUTPATIENT)
Dept: TRANSPLANT | Facility: CLINIC | Age: 55
End: 2020-03-04

## 2020-03-04 ENCOUNTER — TELEPHONE (OUTPATIENT)
Dept: TRANSPLANT | Facility: CLINIC | Age: 55
End: 2020-03-04

## 2020-03-04 NOTE — TELEPHONE ENCOUNTER
Called patient to discuss liver transplant selection committee decision: pt too well for transplant, liver improving at this time. No answer. Left detailed message on  requesting a call back with callback number.

## 2020-03-04 NOTE — COMMITTEE REVIEW
Brunilda Delgadillo's case presented to selection committee.  Patient has been declined for liver transplant due to being medically too early.  MELD is low and patient has no indications for transplant at this time.  I was present at the committee meeting and attest to the decision of the committee.    Sahil Hay  03/04/2020  I was present at the committee meeting and attest to the decision of the committee.    Sahil Hay  03/04/2020

## 2020-03-06 ENCOUNTER — TELEPHONE (OUTPATIENT)
Dept: TRANSPLANT | Facility: CLINIC | Age: 55
End: 2020-03-06

## 2020-03-06 NOTE — TELEPHONE ENCOUNTER
Returned call to patient. She had follow-up questions to my  Wednesday. Pt is currently too well for transplant and will follow with Dr. Haro in Hepatology (or Dr. Serna when she returns from leave, if pt chooses). Pt will keep upcoming appts and follow-up CT as scheduled. Reviewed appts with pt. No other questions at this time.

## 2020-03-06 NOTE — TELEPHONE ENCOUNTER
----- Message from Milagro Killian sent at 3/6/2020 11:48 AM CST -----  Pt is calling to talk to Rickey regarding the voicemail she stated she had a few questions     Pt contact [phone]

## 2020-04-08 ENCOUNTER — TELEPHONE (OUTPATIENT)
Dept: TRANSPLANT | Facility: CLINIC | Age: 55
End: 2020-04-08

## 2020-04-08 ENCOUNTER — OFFICE VISIT (OUTPATIENT)
Dept: TRANSPLANT | Facility: CLINIC | Age: 55
End: 2020-04-08
Payer: COMMERCIAL

## 2020-04-08 DIAGNOSIS — K76.6 PORTAL HYPERTENSION: ICD-10-CM

## 2020-04-08 DIAGNOSIS — R16.0 LIVER MASS: ICD-10-CM

## 2020-04-08 DIAGNOSIS — K76.6 PORTAL HYPERTENSIVE GASTROPATHY: ICD-10-CM

## 2020-04-08 DIAGNOSIS — K70.30 ALCOHOLIC CIRRHOSIS OF LIVER WITHOUT ASCITES: Primary | ICD-10-CM

## 2020-04-08 DIAGNOSIS — K31.89 PORTAL HYPERTENSIVE GASTROPATHY: ICD-10-CM

## 2020-04-08 PROCEDURE — 99213 OFFICE O/P EST LOW 20 MIN: CPT | Mod: 95,,, | Performed by: INTERNAL MEDICINE

## 2020-04-08 PROCEDURE — 99213 PR OFFICE/OUTPT VISIT, EST, LEVL III, 20-29 MIN: ICD-10-PCS | Mod: 95,,, | Performed by: INTERNAL MEDICINE

## 2020-04-08 NOTE — Clinical Note
Doing well, she was supposed to have CT but postponed. I told her to get labs next month (not today) when she goes for CT. Added A1C per her request. If CT next month does not show HCC, she can be followed in Hepatology in 4-6 month

## 2020-04-08 NOTE — PATIENT INSTRUCTIONS
Cirrhosis Counseling  - strict abstinence of alcohol use  - avoid non-steroidal anti-inflammatory drugs (NSAIDs) such as ibuprofen, Motrin, naprosyn, Alleve due to the risk of kidney damage  - can take acetaminophen (Tylenol), no more than 2000 mg per day  - low sodium (salt) 2 gram per day diet  - nutrition: 25-30kcal (calorie per body body weight in kilogram) per day  - no need to restrict protein in diet  - high protein diet: 1.2-1.5 gram/kg (protein per body weight in kilogram) per day to prevent muscle mass loss  - avoid fasting  - Late night snack with 50 gram of complex carbohydrates and protein  - resistance exercises for muscle strength  - avoid raw seafoods due to the risk of fatal Vibrio vulnificus infection  - ultrasound or MRI of the liver every 6 months for liver cancer screening (you are at risk of developing liver cancer due to scar tissue in the liver)  - Upper endoscopy every 1-2 years to screen for varices in the stomach and foodpipe which can burst and cause fatal bleeding

## 2020-04-08 NOTE — PROGRESS NOTES
"Hepatology Clinic Note      The patient location is: home  The chief complaint leading to consultation is: "alcohol-related cirrhosis"  Visit type: Virtual visit with audio only  Total time spent with patient: 15 mins  Each patient to whom he or she provides medical services by telemedicine is:  (1) informed of the relationship between the physician and patient and the respective role of any other health care provider with respect to management of the patient; and (2) notified that he or she may decline to receive medical services by telemedicine and may withdraw from such care at any time.    Notes:       Chief complaint:   Chief Complaint   Patient presents with    Cirrhosis       HPI:  Brunilda Delgadillo is a pleasant 54 y.o. female who was referred to Hepatology Clinic for consultation of had concerns including Cirrhosis..      20: The consult encounter was performed virtually via Video Visit (Telehealth/Telemedicine) due to Covid-19 pandemic.  Patient reports feeling well. Slight leg edema. No ascites, no HE. She was supposed to have CT today but cancelled due to COVID-19 pandemic. Prior CT showed a subcentimeter liver mass.    Lab Results   Component Value Date    AFP 4.3 2019           Patient Active Problem List   Diagnosis    Alcoholic cirrhosis of liver    Ascites    Type 2 diabetes mellitus    History of cellulitis    Hyponatremia    Portal hypertensive gastropathy    Anxiety disorder    Depression    Esophageal varices    Portal hypertension    Liver mass       Past Medical History:   Diagnosis Date    Anxiety disorder 2019    Ascites 2019    Cirrhosis     Depression 2019    Diabetes mellitus     History of cellulitis 2019    Hyponatremia 2019    Portal hypertensive gastropathy 2019    On EGD     Type 2 diabetes mellitus 2019       Past Surgical History:   Procedure Laterality Date     SECTION  ,     COLONOSCOPY      " ESOPHAGOGASTRODUODENOSCOPY N/A 2/12/2020    Procedure: EGD (ESOPHAGOGASTRODUODENOSCOPY);  Surgeon: Ayden Bernal MD;  Location: [facility] ENDO [address]);  Service: Endoscopy;  Laterality: N/A;  labs prior    EYE SURGERY      cosmetic    HERNIA REPAIR  2006    umbilical    PERITONEOCENTESIS N/A 10/21/2019    Procedure: PARACENTESIS, ABDOMINAL;  Surgeon: Israel Hidalgo MD;  Location: [facility] CATH LAB;  Service: Radiology;  Laterality: N/A;    TONSILLECTOMY  1972    TUBAL LIGATION  2005    tummy tuck         Family History   Problem Relation Age of Onset    Glaucoma Mother     Hypertension Mother     Heart disease Father     Prostate cancer Father        Social History     Socioeconomic History    Marital status:      Spouse name: Not on file    Number of children: Not on file    Years of education: Not on file    Highest education level: Not on file   Occupational History    Occupation: homemaker   Social Needs    Financial resource strain: Not on file    Food insecurity:     Worry: Not on file     Inability: Not on file    Transportation needs:     Medical: Not on file     Non-medical: Not on file   Tobacco Use    Smoking status: Never Smoker    Smokeless tobacco: Never Used   Substance and Sexual Activity    Alcohol use: Not Currently    Drug use: Not Currently    Sexual activity: Not on file   Lifestyle    Physical activity:     Days per week: Not on file     Minutes per session: Not on file    Stress: Not on file   Relationships    Social connections:     Talks on phone: Not on file     Gets together: Not on file     Attends Confucianist service: Not on file     Active member of club or organization: Not on file     Attends meetings of clubs or organizations: Not on file     Relationship status: Not on file   Other Topics Concern    Not on file   Social History Narrative    Not on file       Current Outpatient Medications   Medication Sig Dispense Refill    DULoxetine (CYMBALTA) 60 MG  "capsule Take 60 mg by mouth once daily.       folic acid (FOLVITE) 1 MG tablet Take 1 tablet (1 mg total) by mouth once daily. 30 tablet 11    furosemide (LASIX) 40 MG tablet Take 0.5 tablets (20 mg total) by mouth once daily. 15 tablet 11    gabapentin (NEURONTIN) 300 MG capsule Take 3 capsules (900mg) at night. 90 capsule 11    insulin detemir (LEVEMIR U-100 INSULIN SUBQ) Inject 40 Units into the skin once daily.      metOLazone (ZAROXOLYN) 2.5 MG tablet Take every other day 30 min prior to other diuretics 30 tablet 11    multivit with minerals/lutein (MULTIVITAMIN 50 PLUS ORAL) Take by mouth.      pen needle, diabetic 30 gauge x 1/3" Ndle NovoFine 30 30 gauge x 1/3" needle      spironolactone (ALDACTONE) 100 MG tablet Take 2 tablets (200 mg total) by mouth once daily. 30 tablet 11    thiamine (VITAMIN B-1) 100 MG tablet Take 100 mg by mouth.       No current facility-administered medications for this visit.        Review of patient's allergies indicates:   Allergen Reactions    Penicillins      Rash as a child       Review of Systems   Constitutional: Positive for malaise/fatigue. Negative for chills, fever and weight loss.   HENT: Negative for congestion, nosebleeds and sore throat.    Eyes: Negative for blurred vision, double vision and photophobia.   Respiratory: Negative for cough and shortness of breath.    Cardiovascular: Negative for chest pain, palpitations, orthopnea and leg swelling.   Gastrointestinal: Negative for abdominal pain, blood in stool, constipation, diarrhea, melena and vomiting.   Genitourinary: Negative for dysuria.   Musculoskeletal: Negative for falls and joint pain.   Skin: Negative for itching and rash.   Neurological: Negative for dizziness, tremors and weakness.   Endo/Heme/Allergies: Does not bruise/bleed easily.   Psychiatric/Behavioral: Negative for depression and substance abuse. The patient is not nervous/anxious and does not have insomnia.        There were no vitals " filed for this visit.    Physical Exam    LABS: I personally reviewed pertinent laboratory findings.    Lab Results   Component Value Date    ALT 22 02/20/2020    AST 24 02/20/2020     (H) 11/26/2019    ALKPHOS 140 (H) 02/20/2020    BILITOT 1.0 02/20/2020       Lab Results   Component Value Date    WBC 6.99 02/20/2020    HGB 12.5 02/20/2020    HCT 38.8 02/20/2020    MCV 98 02/20/2020     02/20/2020       Lab Results   Component Value Date     02/20/2020    K 4.1 02/20/2020    CL 98 02/20/2020    CO2 32 (H) 02/20/2020    BUN 13 02/20/2020    CREATININE 1.0 02/20/2020    CALCIUM 9.9 02/20/2020    ANIONGAP 6 (L) 02/20/2020    ESTGFRAFRICA >60.0 02/20/2020    EGFRNONAA >60.0 02/20/2020       Lab Results   Component Value Date    HEPBCAB Negative 11/26/2019    HEPCAB Negative 11/26/2019       Lab Results   Component Value Date    SMOOTHMUSCAB Positive 1:40 (A) 11/26/2019       I personally reviewed all recent lab results.  I personally reviewed imaging studies.    Assessment:  54 y.o. female presenting with     1. Alcoholic cirrhosis of liver without ascites    2. Portal hypertensive gastropathy    3. Portal hypertension    4. Liver mass      Low MELD, recompensated. No indication for liver transplantation  Subcentimeter - liver mass needs to be followed    Recommendation(s):  - MELD labs and CT for liver mass in May 2020 after COVID-19 quarantine  -  If liver mass is stable/benign, follow up in 6 month in Hepatology    Cirrhosis Counseling  - strict abstinence of alcohol use  - avoid non-steroidal anti-inflammatory drugs (NSAIDs) such as ibuprofen, Motrin, naprosyn, Alleve due to the risk of kidney damage  - can take acetaminophen (Tylenol), no more than 2000 mg per day  - low sodium (salt) 2 gram per day diet  - no need to restrict protein in diet  - high protein diet: 1.2-1.5 gram/kg (protein per body weight in kilogram) per day to prevent muscle mass loss  - avoid fasting  - Late night snack with  50 gram of complex carbohydrates and protein  - resistance exercises for muscle strength  - avoid raw seafoods due to the risk of fatal Vibrio vulnificus infection  - ultrasound or MRI of the liver every 6 months for liver cancer screening (you are at risk of developing liver cancer due to scar tissue in the liver)  - Upper endoscopy every 1-2 years to screen for varices in the stomach and foodpipe which can burst and cause fatal bleeding      A total of 15 minutes were spent face-to-face with the patient during this encounter and over half of that time was spent on counseling and coordination of care.  We discussed in depth the nature of the patient's liver disease, the management plan in details. I also educated the patient about lifestyle modifications which may improve hepatic steatosis, overweight/obesity, insulin resistance and high blood pressure issues. I have provided the patient with an opportunity to ask questions and have all questions answered to his satisfaction.     I have sent communication to the referring physician and/or primary care provider.    Amrit Haro MD  Staff Physician  Hepatology and Liver Transplant  [facility]  [facility] [place]

## 2020-04-08 NOTE — LETTER
April 8, 2020        Silviano Clark  [address]  [address]  [facility]  [place] [address]  Phone: [phone]  Fax: [phone]             [facility]  [address]  [facility] [address]  Phone: [phone]   Patient: Brunilda Delgadillo   MR Number: [id number]   YOB: 1965   Date of Visit: 4/8/2020       Dear Dr. Silviano Clark    Thank you for referring Brunilda Delgadillo to me for evaluation. Attached you will find relevant portions of my assessment and plan of care.    If you have questions, please do not hesitate to call me. I look forward to following Brunilda Delgadillo along with you.    Sincerely,    Amrit Haro MD    Enclosure    If you would like to receive this communication electronically, please contact [email or web address] or [phone] to request Pose Link access.    Pose Link is a tool which provides read-only access to select patient information with whom you have a relationship. Its easy to use and provides real time access to review your patients record including encounter summaries, notes, results, and demographic information.    If you feel you have received this communication in error or would no longer like to receive these types of communications, please e-mail [email or web address]

## 2020-04-08 NOTE — LETTER
April 8, 2020        Brett Michele III, MD  [address]  [address]  [facility] [address]             [facility] - Liver Transplant  [address]  [place] [address]  Phone: [phone]   Patient: Brunilda Delgadillo   MR Number: [id number]   YOB: 1965   Date of Visit: 4/8/2020       Dear Dr. Michele:    Thank you for referring Brunilda Delgadillo to me for evaluation. Attached you will find relevant portions of my assessment and plan of care.    If you have questions, please do not hesitate to call me. I look forward to following Brunilda Delgadillo along with you.    Sincerely,      Amrit Haro MD            CC  No Recipients    Enclosure

## 2020-04-08 NOTE — TELEPHONE ENCOUNTER
CoVid-19 precaution - Transplant [facility] Hepatology follow up conducted with patient by telephone, [phone].   SW spoke with pt via phone today after her video visit with her hepatologist today.   Pt engaged and communicative during conversation.   Pt reports she and her  coping with Covid pandemic and social isolation.   Pt denies coping issues at this time and reports her depression managed with continuing prescribed Cymbalta.   Pt reports continued sobriety with last drink Sept 2019 and reports no reported urges to drink.  Pt denies attending any 12 step meetings or enrolling in IOP.  Pt denies any SI/HI.  Pt with no psychosocial needs at this time.  Patient aware of transplant SW availability.  SW remains available for all resources and psychosocial support.

## 2020-05-29 ENCOUNTER — TELEPHONE (OUTPATIENT)
Dept: HEPATOLOGY | Facility: CLINIC | Age: 55
End: 2020-05-29

## 2020-05-29 NOTE — TELEPHONE ENCOUNTER
----- Message from Amrit Haro MD sent at 5/29/2020 12:01 PM CDT -----  Contact: Pt  Hepatology. She needs a CT - ordered, please schedule to be done in May. Her return will be in 6 month - October.    ----- Message -----  From: Warner Pedersen MA  Sent: 5/29/2020  10:21 AM CDT  To: Amrit Haro MD    Is this pt a hepatology pt or pre liver  ----- Message -----  From: Rickey Vizcaino RN  Sent: 5/28/2020   3:32 PM CDT  To: [facility] Hepat Clinical Staff    Hepatology pt of Tahir's  ----- Message -----  From: Warner Pedersen MA  Sent: 5/28/2020   3:30 PM CDT  To: [facility] Pre-Liver Transplant Clinical        ----- Message -----  From: Kathy Fajardo  Sent: 5/28/2020   3:10 PM CDT  To: [facility] Staff    Pt is calling to set-up appt.    Pt# [phone]

## 2020-05-29 NOTE — TELEPHONE ENCOUNTER
----- Message from Amrit Haro MD sent at 5/29/2020  1:24 PM CDT -----  Contact: Pt  No, CT only. Blood work when she comes back in Oct.  ----- Message -----  From: Warner Pedersen MA  Sent: 5/29/2020   1:20 PM CDT  To: Amrit Haro MD    Pt was asking do she need blood work done ?  ----- Message -----  From: Amrit Haro MD  Sent: 5/29/2020  12:01 PM CDT  To: Warner Pedersen MA    Hepatology. She needs a CT - ordered, please schedule to be done in May. Her return will be in 6 month - October.    ----- Message -----  From: Warner Pedersen MA  Sent: 5/29/2020  10:21 AM CDT  To: Amrit Haro MD    Is this pt a hepatology pt or pre liver  ----- Message -----  From: Rickey Vizcaino RN  Sent: 5/28/2020   3:32 PM CDT  To: [facility] Hepat Clinical Staff    Hepatology pt of Latt[facility]  ----- Message -----  From: Warner Pedersen MA  Sent: 5/28/2020   3:30 PM CDT  To: [facility] Pre-Liver Transplant Clinical        ----- Message -----  From: Kathy Fajardo  Sent: 5/28/2020   3:10 PM CDT  To: [facility] Staff    Pt is calling to set-up appt.    Pt# [phone]

## 2020-05-29 NOTE — TELEPHONE ENCOUNTER
MA called pt and scheduled her ct scan will mail out appointment reminder, she also wanted me to ask dr. boo did she need lab work will forward the message will put in a recall and reminder for Her return will be in 6 month - October.

## 2020-05-29 NOTE — TELEPHONE ENCOUNTER
MA tried calling pt several times seems as if the phone is off the hook, just was trying to inform her that she doesn't need Blood work til oct

## 2020-06-10 ENCOUNTER — HOSPITAL ENCOUNTER (OUTPATIENT)
Dept: RADIOLOGY | Facility: HOSPITAL | Age: 55
Discharge: HOME | End: 2020-06-10
Attending: INTERNAL MEDICINE
Payer: COMMERCIAL

## 2020-06-10 DIAGNOSIS — Z76.82 ORGAN TRANSPLANT CANDIDATE: ICD-10-CM

## 2020-06-10 DIAGNOSIS — R16.0 LIVER MASS: ICD-10-CM

## 2020-06-10 DIAGNOSIS — K70.30 ALCOHOLIC CIRRHOSIS OF LIVER WITHOUT ASCITES: ICD-10-CM

## 2020-06-10 PROCEDURE — 74177 CT ABD & PELVIS W/CONTRAST: CPT | Mod: TC

## 2020-06-10 PROCEDURE — 25500020 PHARM REV CODE 255: Performed by: INTERNAL MEDICINE

## 2020-06-10 PROCEDURE — 74177 CT ABD & PELVIS W/CONTRAST: CPT | Mod: 26,,, | Performed by: RADIOLOGY

## 2020-06-10 PROCEDURE — 74177 CT ABDOMEN PELVIS WITH CONTRAST: ICD-10-PCS | Mod: 26,,, | Performed by: RADIOLOGY

## 2020-06-10 RX ADMIN — IOHEXOL 75 ML: 350 INJECTION, SOLUTION INTRAVENOUS at 03:06

## 2020-06-14 ENCOUNTER — TELEPHONE (OUTPATIENT)
Dept: HEPATOLOGY | Facility: CLINIC | Age: 55
End: 2020-06-14

## 2020-06-14 NOTE — TELEPHONE ENCOUNTER
Patient: Brunilda Delgadillo       MRN: [id number]      : 1965     Age: 55 y.o.  [address] [facility] [address] [facility] [address]    Provider: Hepatologist - Tahir/Daphne    Urgency of review: non-urgent    Patient Transplant Status: Other non-transplant    Reason for presentation: Indeterminate lesion    Clinical Summary:   55 y.o. female with PMHx DM, depression and anxiety who has ESLD secondary to alcoholic liver disease.   Recompensated. MELD: 8.     US Abdomen 19: 1.0 x 0.9 x 0.9 cm hypoechoic lesion in the right hepatic lobe at the dome.    Following with CT for subcentimeter indeterminate lesion  AFP: 4.3    Imaging to be reviewed: CT    HCC Treatment History: n/a    ABO: O POS    Platelets:   Lab Results   Component Value Date/Time     2020 08:27 AM     Creatinine:   Lab Results   Component Value Date/Time    CREATININE 1.0 2020 08:27 AM     Bilirubin:   Lab Results   Component Value Date/Time    BILITOT 1.0 2020 08:27 AM     AFP Last 3 each if available:   Lab Results   Component Value Date/Time    AFP 4.3 2019 08:10 AM    AFP 6.1 2019 01:10 PM       MELD: MELD-Na score: 8 at 2020  8:27 AM  MELD score: 8 at 2020  8:27 AM  Calculated from:  Serum Creatinine: 1.0 mg/dL at 2020  8:27 AM  Serum Sodium: 136 mmol/L at 2020  8:27 AM  Total Bilirubin: 1.0 mg/dL at 2020  8:27 AM  INR(ratio): 1.2 at 2020  8:27 AM  Age: 54 years 8 months    Plan:     Follow-up Provider:

## 2020-08-26 DIAGNOSIS — K76.9 LIVER DISEASE, UNSPECIFIED: ICD-10-CM

## 2020-08-26 DIAGNOSIS — K70.30 ALCOHOLIC CIRRHOSIS OF LIVER WITHOUT ASCITES: Primary | ICD-10-CM

## 2020-08-31 DIAGNOSIS — K70.31 ALCOHOLIC CIRRHOSIS OF LIVER WITH ASCITES: ICD-10-CM

## 2020-08-31 DIAGNOSIS — Z76.82 ORGAN TRANSPLANT CANDIDATE: ICD-10-CM

## 2020-08-31 DIAGNOSIS — K70.30 ALCOHOLIC CIRRHOSIS OF LIVER WITHOUT ASCITES: Primary | ICD-10-CM

## 2020-08-31 DIAGNOSIS — R18.8 OTHER ASCITES: ICD-10-CM

## 2020-08-31 RX ORDER — SPIRONOLACTONE 100 MG/1
200 TABLET, FILM COATED ORAL DAILY
Qty: 60 TABLET | Refills: 11 | Status: SHIPPED | OUTPATIENT
Start: 2020-08-31 | End: 2021-08-16 | Stop reason: SDUPTHER

## 2020-08-31 NOTE — TELEPHONE ENCOUNTER
Spoke with patient.  I rescheduled her MRI appointment.  I scheduled her lab appointment on same day.  I requested Aldactone refill. I sent message to Dr Haro.              ----- Message from Kendal Desai MA sent at 8/28/2020  4:32 PM CDT -----  Regarding: FW: Appointment / Missed Call  Contact: PT    ----- Message -----  From: Leda Hughes  Sent: 8/28/2020   4:18 PM CDT  To: [facility] Staff  Subject: Appointment / Missed Call                        PT keeps missing call from Ester - PT would like to speak with whoever else can help her with the MRI that is currently scheduled - would like it later in the month if possible     Callback: [phone]

## 2020-09-04 ENCOUNTER — TELEPHONE (OUTPATIENT)
Dept: HEPATOLOGY | Facility: CLINIC | Age: 55
End: 2020-09-04

## 2020-09-29 ENCOUNTER — HOSPITAL ENCOUNTER (OUTPATIENT)
Dept: RADIOLOGY | Facility: HOSPITAL | Age: 55
Discharge: HOME | End: 2020-09-29
Attending: INTERNAL MEDICINE
Payer: COMMERCIAL

## 2020-09-29 DIAGNOSIS — K76.9 LIVER DISEASE, UNSPECIFIED: ICD-10-CM

## 2020-09-29 DIAGNOSIS — K70.30 ALCOHOLIC CIRRHOSIS OF LIVER WITHOUT ASCITES: ICD-10-CM

## 2020-09-29 PROCEDURE — 74183 MRI ABD W/O CNTR FLWD CNTR: CPT | Mod: 26,,, | Performed by: RADIOLOGY

## 2020-09-29 PROCEDURE — 74183 MRI ABDOMEN W WO CONTRAST: ICD-10-PCS | Mod: 26,,, | Performed by: RADIOLOGY

## 2020-09-29 PROCEDURE — 74183 MRI ABD W/O CNTR FLWD CNTR: CPT | Mod: TC

## 2020-09-29 PROCEDURE — A9585 GADOBUTROL INJECTION: HCPCS | Performed by: INTERNAL MEDICINE

## 2020-09-29 PROCEDURE — 25500020 PHARM REV CODE 255: Performed by: INTERNAL MEDICINE

## 2020-09-29 RX ORDER — GADOBUTROL 604.72 MG/ML
10 INJECTION INTRAVENOUS
Status: COMPLETED | OUTPATIENT
Start: 2020-09-29 | End: 2020-09-29

## 2020-09-29 RX ADMIN — GADOBUTROL 10 ML: 604.72 INJECTION INTRAVENOUS at 11:09

## 2020-10-08 ENCOUNTER — TELEPHONE (OUTPATIENT)
Dept: HEPATOLOGY | Facility: CLINIC | Age: 55
End: 2020-10-08

## 2020-10-08 NOTE — TELEPHONE ENCOUNTER
Patient: Brunilda Delgadillo       MRN: [id number]      : 1965     Age: 55 y.o.  [address] [facility] [facility] [address]    Provider: Hepatologist Bisi Haro/Thomas    Priority of review: Other    Reason for presentation: Indeterminate lesion    Clinical Summary:   55 y.o. female with PMHx DM, depression and anxiety who has ESLD secondary to alcoholic liver disease.   Recompensated. MELD: 10.     US Abdomen 19: 1.0 x 0.9 x 0.9 cm hypoechoic lesion in the right hepatic lobe at the dome.     Following with CT for subcentimeter indeterminate lesion in  -   AFP: 4.3  CT shows indeterminate enhancing focus in the dome    MRI obtained this time. Does not seem to have any enhancing lesion. We can go back to US every 6 month ?    Imaging to be reviewed: most recent MRI    HCC Treatment History: n/a    ABO: O POS    Platelets:   Lab Results   Component Value Date/Time     (L) 2020 10:00 AM     Creatinine:   Lab Results   Component Value Date/Time    CREATININE 0.9 2020 10:00 AM     Bilirubin:   Lab Results   Component Value Date/Time    BILITOT 1.9 (H) 2020 10:00 AM     AFP Last 3 each if available:   Lab Results   Component Value Date/Time    AFP 3.2 2020 10:00 AM    AFP 4.3 2019 08:10 AM    AFP 6.1 2019 01:10 PM       MELD: MELD-Na score: 10 at 2020 10:00 AM  MELD score: 10 at 2020 10:00 AM  Calculated from:  Serum Creatinine: 0.9 mg/dL (Rounded to 1 mg/dL) at 2020 10:00 AM  Serum Sodium: 129 mmol/L at 2020 10:00 AM  Total Bilirubin: 1.9 mg/dL at 2020 10:00 AM  INR(ratio): 1.1 at 2020 10:00 AM  Age: 55 years 4 months    Plan:     Follow-up Provider:

## 2020-10-11 NOTE — PROGRESS NOTES
Hepatology Follow Up Note    Referring provider: No ref. provider found  PCP: Brett Michele MD    Chief complaint: follow up EtOH cirrhosis    HPI:  Brunilda Delgadillo is a 55 y.o. female with history of alcohol-related cirrhosis who presents for scheduled follow up.  She reports burning pain in her feet that has not improved with gabapentin.  She also reports bulging in her abdomen.  She is otherwise without complaints.  She has not had a recent hospitalizations or ED visits. She continues to drink alcohol. Last drink was yesterday evening. She reports drinking in order to cope with her bilateral foot pain. She denies recent hematemesis, coffee ground emesis, melena, hematochezia, jaundice, confusion.    Past Medical History:   Diagnosis Date    Anxiety disorder 2019    Ascites 2019    Cirrhosis     Depression 2019    Diabetes mellitus     History of cellulitis 2019    Hyponatremia 2019    Portal hypertensive gastropathy 2019    On EGD     Type 2 diabetes mellitus 2019       Past Surgical History:   Procedure Laterality Date     SECTION  ,     COLONOSCOPY      ESOPHAGOGASTRODUODENOSCOPY N/A 2020    Procedure: EGD (ESOPHAGOGASTRODUODENOSCOPY);  Surgeon: Ayden Bernal MD;  Location: [address]);  Service: Endoscopy;  Laterality: N/A;  labs prior    EYE SURGERY      cosmetic    HERNIA REPAIR      umbilical    PERITONEOCENTESIS N/A 10/21/2019    Procedure: PARACENTESIS, ABDOMINAL;  Surgeon: Israel Hidalgo MD;  Location: [facility] CATH LAB;  Service: Radiology;  Laterality: N/A;    TONSILLECTOMY  1972    TUBAL LIGATION      [facility]         Family History   Problem Relation Age of Onset    Glaucoma Mother     Hypertension Mother     Heart disease Father     Prostate cancer Father        Social History     Tobacco Use    Smoking status: Never Smoker    Smokeless tobacco: Never Used   Substance Use Topics    Alcohol  "use: Not Currently    Drug use: Not Currently       Current Outpatient Medications   Medication Sig Dispense Refill    DULoxetine (CYMBALTA) 60 MG capsule Take 60 mg by mouth once daily.       folic acid (FOLVITE) 1 MG tablet Take 1 tablet (1 mg total) by mouth once daily. 30 tablet 11    furosemide (LASIX) 40 MG tablet Take 0.5 tablets (20 mg total) by mouth once daily. 15 tablet 11    gabapentin (NEURONTIN) 300 MG capsule Take 3 capsules (900mg) at night. 90 capsule 11    hepatitis A and B vaccine, PF, (TWINRIX) 720 JILL unit- 20 mcg/mL Syrg suspension Inject 1 mL (720 Units total) into the muscle once. for 1 dose 1 mL 0    insulin (LANTUS SOLOSTAR U-100 INSULIN) glargine 100 units/mL (3mL) SubQ pen Lantus Solostar U-100 Insulin 100 unit/mL (3 mL) subcutaneous pen      insulin detemir (LEVEMIR U-100 INSULIN SUBQ) Inject 40 Units into the skin once daily.      metOLazone (ZAROXOLYN) 2.5 MG tablet Take every other day 30 min prior to other diuretics 30 tablet 11    multivit with minerals/lutein (MULTIVITAMIN 50 PLUS ORAL) Take by mouth.      pen needle, diabetic 30 gauge x 1/3" Ndle NovoFine 30 30 gauge x 1/3" needle      spironolactone (ALDACTONE) 100 MG tablet Take 2 tablets (200 mg total) by mouth once daily. 60 tablet 11    thiamine (VITAMIN B-1) 100 MG tablet Take 100 mg by mouth.       No current facility-administered medications for this visit.        Review of patient's allergies indicates:   Allergen Reactions    Penicillins      Rash as a child       Review of Systems   Constitutional: Negative for fever and weight loss.   HENT: Negative for congestion and sore throat.    Eyes: Negative for blurred vision and double vision.   Respiratory: Negative for cough and shortness of breath.    Cardiovascular: Negative for chest pain and leg swelling.   Gastrointestinal: Negative for abdominal pain, blood in stool, constipation, diarrhea, melena, nausea and vomiting.   Genitourinary: Negative for " "dysuria and hematuria.   Musculoskeletal: Negative for joint pain and myalgias.   Skin: Negative for itching and rash.   Neurological: Positive for tingling. Negative for tremors, seizures and headaches.   Endo/Heme/Allergies: Bruises/bleeds easily.   Psychiatric/Behavioral: Negative for depression. The patient is not nervous/anxious.        Vitals:    10/12/20 1112   BP: 137/80   Pulse: 105   Resp: 20   SpO2: 98%   Weight: 73.2 kg (161 lb 6 oz)   Height: 5' 4" (1.626 m)       Physical Exam  Vitals signs reviewed.   Constitutional:       General: She is not in acute distress.     Appearance: She is well-developed.   HENT:      Head: Normocephalic and atraumatic.   Eyes:      General: No scleral icterus.     Extraocular Movements: Extraocular movements intact.      Conjunctiva/sclera: Conjunctivae normal.   Cardiovascular:      Rate and Rhythm: Normal rate and regular rhythm.   Pulmonary:      Effort: Pulmonary effort is normal. No respiratory distress.      Breath sounds: Normal breath sounds. No wheezing, rhonchi or rales.   Abdominal:      General: Bowel sounds are normal. There is no distension.      Palpations: Abdomen is soft.      Tenderness: There is no abdominal tenderness.      Hernia: A hernia (ventral) is present.   Musculoskeletal:         General: No swelling or deformity.      Right lower leg: No edema.      Left lower leg: No edema.   Skin:     General: Skin is warm and dry.      Coloration: Skin is not jaundiced.   Neurological:      General: No focal deficit present.      Mental Status: She is alert and oriented to person, place, and time.   Psychiatric:         Mood and Affect: Mood and affect normal.         Behavior: Behavior normal. Behavior is cooperative.         LABS: I personally reviewed pertinent laboratory findings.    Lab Results   Component Value Date    WBC 9.21 09/29/2020    HGB 13.7 09/29/2020    HCT 41.5 09/29/2020    MCV 99 (H) 09/29/2020     (L) 09/29/2020       Lab Results "   Component Value Date     (L) 09/29/2020    K 4.0 09/29/2020    CL 90 (L) 09/29/2020    CO2 29 09/29/2020    BUN 14 09/29/2020    CREATININE 0.9 09/29/2020    CALCIUM 9.6 09/29/2020    ANIONGAP 10 09/29/2020    ESTGFRAFRICA >60.0 09/29/2020    EGFRNONAA >60.0 09/29/2020       Lab Results   Component Value Date    ALT 22 09/29/2020    AST 23 09/29/2020     (H) 11/26/2019    ALKPHOS 133 09/29/2020    BILITOT 1.9 (H) 09/29/2020       Lab Results   Component Value Date    HEPBCAB Negative 11/26/2019    HEPCAB Negative 11/26/2019       Lab Results   Component Value Date    SMOOTHMUSCAB Positive 1:40 (A) 11/26/2019    CERULOPLSM 44.0 11/26/2019        MELD-Na score: 10 at 9/29/2020 10:00 AM  MELD score: 10 at 9/29/2020 10:00 AM  Calculated from:  Serum Creatinine: 0.9 mg/dL (Rounded to 1 mg/dL) at 9/29/2020 10:00 AM  Serum Sodium: 129 mmol/L at 9/29/2020 10:00 AM  Total Bilirubin: 1.9 mg/dL at 9/29/2020 10:00 AM  INR(ratio): 1.1 at 9/29/2020 10:00 AM  Age: 55 years 4 months     IMAGING: I personally reviewed imaging studies.    Assessment:  55 y.o. female with alcohol related cirrhosis who presents for scheduled follow up. MELD-Na 10. She is decompensated with ascites.    1. Alcoholic cirrhosis of liver with ascites        Recommendations:  1. Cirrhosis due to alcohol  - Patient counseled on complete alcohol cessation. Last drink yesterday evening. PETH 201 in 09/2020.  - Discussed the risks of liver failure and death with ongoing alcohol use.  - Recommended AA or rehab, but patient declined stating that she can quit in on her own.    2. Ascites/Edema: 2 gm Na diet. Continue Lasix 20 mg daily and Aldactone 200 mg daily.    3. Encephalopathy: Not an active issue.    4. Transplant candidacy: Transplant evaluation not warranted given low MELD and ongoing alcohol use.    Cirrhosis HM  - HCC screening: MRI 09/2020 with subcentimeter enhancing lesion in hepatic dome.  Will review in multidisciplinary liver  radiology conference.    - Variceal screening: EGD in 02/2020 showed no varices. Repeat EGD in 02/2021.    - Immunizations: Immune to HAV and HBV. She reports taking 2 doses of Twinrix. 3rd dose sent to [facility].    Return to clinic in 6 months.    Jose Guzman MD  Staff Physician  Hepatology and Liver Transplant  [facility]  [facility]

## 2020-10-12 ENCOUNTER — OFFICE VISIT (OUTPATIENT)
Dept: HEPATOLOGY | Facility: CLINIC | Age: 55
End: 2020-10-12
Payer: COMMERCIAL

## 2020-10-12 VITALS
DIASTOLIC BLOOD PRESSURE: 80 MMHG | HEIGHT: 64 IN | HEART RATE: 105 BPM | RESPIRATION RATE: 20 BRPM | OXYGEN SATURATION: 98 % | BODY MASS INDEX: 27.55 KG/M2 | WEIGHT: 161.38 LBS | SYSTOLIC BLOOD PRESSURE: 137 MMHG

## 2020-10-12 DIAGNOSIS — K70.31 ALCOHOLIC CIRRHOSIS OF LIVER WITH ASCITES: Primary | ICD-10-CM

## 2020-10-12 PROCEDURE — 99999 PR PBB SHADOW E&M-EST. PATIENT-LVL IV: ICD-10-PCS | Mod: PBBFAC,,, | Performed by: STUDENT IN AN ORGANIZED HEALTH CARE EDUCATION/TRAINING PROGRAM

## 2020-10-12 PROCEDURE — 99999 PR PBB SHADOW E&M-EST. PATIENT-LVL IV: CPT | Mod: PBBFAC,,, | Performed by: STUDENT IN AN ORGANIZED HEALTH CARE EDUCATION/TRAINING PROGRAM

## 2020-10-12 PROCEDURE — 99214 OFFICE O/P EST MOD 30 MIN: CPT | Mod: S$GLB,,, | Performed by: STUDENT IN AN ORGANIZED HEALTH CARE EDUCATION/TRAINING PROGRAM

## 2020-10-12 PROCEDURE — 3008F PR BODY MASS INDEX (BMI) DOCUMENTED: ICD-10-PCS | Mod: CPTII,S$GLB,, | Performed by: STUDENT IN AN ORGANIZED HEALTH CARE EDUCATION/TRAINING PROGRAM

## 2020-10-12 PROCEDURE — 99214 PR OFFICE/OUTPT VISIT, EST, LEVL IV, 30-39 MIN: ICD-10-PCS | Mod: S$GLB,,, | Performed by: STUDENT IN AN ORGANIZED HEALTH CARE EDUCATION/TRAINING PROGRAM

## 2020-10-12 PROCEDURE — 3008F BODY MASS INDEX DOCD: CPT | Mod: CPTII,S$GLB,, | Performed by: STUDENT IN AN ORGANIZED HEALTH CARE EDUCATION/TRAINING PROGRAM

## 2020-10-12 RX ORDER — INSULIN GLARGINE 100 [IU]/ML
20 INJECTION, SOLUTION SUBCUTANEOUS DAILY
COMMUNITY
End: 2022-01-07 | Stop reason: SDUPTHER

## 2020-10-19 DIAGNOSIS — K76.9 LIVER DISEASE, UNSPECIFIED: ICD-10-CM

## 2020-10-19 DIAGNOSIS — K70.31 ALCOHOLIC CIRRHOSIS OF LIVER WITH ASCITES: Primary | ICD-10-CM

## 2020-12-17 ENCOUNTER — TELEPHONE (OUTPATIENT)
Dept: HEPATOLOGY | Facility: CLINIC | Age: 55
End: 2020-12-17

## 2020-12-17 NOTE — TELEPHONE ENCOUNTER
----- Message from Ester Wyman RN sent at 12/17/2020  8:41 AM CST -----  Patient needs appt with Dr salgado in Jan, after CT and lab.

## 2020-12-22 ENCOUNTER — TELEPHONE (OUTPATIENT)
Dept: HEPATOLOGY | Facility: CLINIC | Age: 55
End: 2020-12-22

## 2020-12-22 NOTE — TELEPHONE ENCOUNTER
----- Message from Ester Wyman RN sent at 12/22/2020  9:28 AM CST -----  I called her and explained that IR conference recommended MRI in 3 mo due in Dec. She will need f/u with Dr Guzman after MRI. She knows. Please set up appointment.     ----- Message -----  From: Warner Pedersen MA  Sent: 12/21/2020   4:33 PM CST  To: Ester Wyman RN    Hello Mrs. Norman pt was trying to see why she had to get another MRI procedure she stated she had once in September. Can you please advice.

## 2020-12-22 NOTE — TELEPHONE ENCOUNTER
MA called pt and got her scheduled for a follow up after labs and mri will mail appointment reminders.

## 2021-03-09 DIAGNOSIS — G62.9 NEUROPATHY: ICD-10-CM

## 2021-03-09 RX ORDER — GABAPENTIN 300 MG/1
CAPSULE ORAL
Qty: 60 CAPSULE | Refills: 10 | OUTPATIENT
Start: 2021-03-09

## 2021-04-26 ENCOUNTER — PATIENT MESSAGE (OUTPATIENT)
Dept: RESEARCH | Facility: HOSPITAL | Age: 56
End: 2021-04-26

## 2021-05-14 ENCOUNTER — TELEPHONE (OUTPATIENT)
Dept: TRANSPLANT | Facility: CLINIC | Age: 56
End: 2021-05-14

## 2021-05-20 ENCOUNTER — TELEPHONE (OUTPATIENT)
Dept: HEPATOLOGY | Facility: CLINIC | Age: 56
End: 2021-05-20

## 2021-05-21 ENCOUNTER — TELEPHONE (OUTPATIENT)
Dept: HEPATOLOGY | Facility: CLINIC | Age: 56
End: 2021-05-21

## 2021-05-21 DIAGNOSIS — K76.9 LIVER LESION: Primary | ICD-10-CM

## 2021-05-24 ENCOUNTER — TELEPHONE (OUTPATIENT)
Dept: HEPATOLOGY | Facility: CLINIC | Age: 56
End: 2021-05-24

## 2021-05-25 ENCOUNTER — LAB VISIT (OUTPATIENT)
Dept: LAB | Facility: HOSPITAL | Age: 56
End: 2021-05-25
Attending: INTERNAL MEDICINE
Payer: COMMERCIAL

## 2021-05-25 DIAGNOSIS — K76.9 LIVER LESION: ICD-10-CM

## 2021-05-25 LAB
AFP SERPL-MCNC: 3.3 NG/ML (ref 0–8.4)
ALBUMIN SERPL BCP-MCNC: 3.8 G/DL (ref 3.5–5.2)
ALP SERPL-CCNC: 230 U/L (ref 55–135)
ALT SERPL W/O P-5'-P-CCNC: 30 U/L (ref 10–44)
ANION GAP SERPL CALC-SCNC: 9 MMOL/L (ref 8–16)
AST SERPL-CCNC: 27 U/L (ref 10–40)
BASOPHILS # BLD AUTO: 0.04 K/UL (ref 0–0.2)
BASOPHILS NFR BLD: 0.7 % (ref 0–1.9)
BILIRUB SERPL-MCNC: 1 MG/DL (ref 0.1–1)
BUN SERPL-MCNC: 6 MG/DL (ref 6–20)
CALCIUM SERPL-MCNC: 9.9 MG/DL (ref 8.7–10.5)
CHLORIDE SERPL-SCNC: 97 MMOL/L (ref 95–110)
CO2 SERPL-SCNC: 31 MMOL/L (ref 23–29)
CREAT SERPL-MCNC: 0.8 MG/DL (ref 0.5–1.4)
DIFFERENTIAL METHOD: ABNORMAL
EOSINOPHIL # BLD AUTO: 0.4 K/UL (ref 0–0.5)
EOSINOPHIL NFR BLD: 7.3 % (ref 0–8)
ERYTHROCYTE [DISTWIDTH] IN BLOOD BY AUTOMATED COUNT: 13 % (ref 11.5–14.5)
EST. GFR  (AFRICAN AMERICAN): >60 ML/MIN/1.73 M^2
EST. GFR  (NON AFRICAN AMERICAN): >60 ML/MIN/1.73 M^2
GLUCOSE SERPL-MCNC: 202 MG/DL (ref 70–110)
HCT VFR BLD AUTO: 39.8 % (ref 37–48.5)
HGB BLD-MCNC: 13.5 G/DL (ref 12–16)
IMM GRANULOCYTES # BLD AUTO: 0.06 K/UL (ref 0–0.04)
IMM GRANULOCYTES NFR BLD AUTO: 1 % (ref 0–0.5)
INR PPP: 1 (ref 0.8–1.2)
LYMPHOCYTES # BLD AUTO: 1.5 K/UL (ref 1–4.8)
LYMPHOCYTES NFR BLD: 26 % (ref 18–48)
MCH RBC QN AUTO: 34.5 PG (ref 27–31)
MCHC RBC AUTO-ENTMCNC: 33.9 G/DL (ref 32–36)
MCV RBC AUTO: 102 FL (ref 82–98)
MONOCYTES # BLD AUTO: 0.6 K/UL (ref 0.3–1)
MONOCYTES NFR BLD: 9.6 % (ref 4–15)
NEUTROPHILS # BLD AUTO: 3.3 K/UL (ref 1.8–7.7)
NEUTROPHILS NFR BLD: 55.4 % (ref 38–73)
NRBC BLD-RTO: 0 /100 WBC
PLATELET # BLD AUTO: 145 K/UL (ref 150–450)
PMV BLD AUTO: 9.5 FL (ref 9.2–12.9)
POTASSIUM SERPL-SCNC: 4.5 MMOL/L (ref 3.5–5.1)
PROT SERPL-MCNC: 7.2 G/DL (ref 6–8.4)
PROTHROMBIN TIME: 11.1 SEC (ref 9–12.5)
RBC # BLD AUTO: 3.91 M/UL (ref 4–5.4)
SODIUM SERPL-SCNC: 137 MMOL/L (ref 136–145)
WBC # BLD AUTO: 5.92 K/UL (ref 3.9–12.7)

## 2021-05-25 PROCEDURE — 85610 PROTHROMBIN TIME: CPT | Performed by: INTERNAL MEDICINE

## 2021-05-25 PROCEDURE — 85025 COMPLETE CBC W/AUTO DIFF WBC: CPT | Performed by: INTERNAL MEDICINE

## 2021-05-25 PROCEDURE — 82105 ALPHA-FETOPROTEIN SERUM: CPT | Performed by: INTERNAL MEDICINE

## 2021-05-25 PROCEDURE — 80053 COMPREHEN METABOLIC PANEL: CPT | Performed by: INTERNAL MEDICINE

## 2021-05-25 PROCEDURE — 36415 COLL VENOUS BLD VENIPUNCTURE: CPT | Mod: PN | Performed by: INTERNAL MEDICINE

## 2021-05-26 ENCOUNTER — OFFICE VISIT (OUTPATIENT)
Dept: HEPATOLOGY | Facility: CLINIC | Age: 56
End: 2021-05-26
Payer: COMMERCIAL

## 2021-05-26 VITALS
RESPIRATION RATE: 17 BRPM | OXYGEN SATURATION: 98 % | HEIGHT: 64 IN | SYSTOLIC BLOOD PRESSURE: 125 MMHG | TEMPERATURE: 98 F | WEIGHT: 169.75 LBS | BODY MASS INDEX: 28.98 KG/M2 | HEART RATE: 88 BPM | DIASTOLIC BLOOD PRESSURE: 74 MMHG

## 2021-05-26 DIAGNOSIS — Z76.82 ORGAN TRANSPLANT CANDIDATE: ICD-10-CM

## 2021-05-26 DIAGNOSIS — R18.8 OTHER ASCITES: Primary | ICD-10-CM

## 2021-05-26 DIAGNOSIS — K70.31 ALCOHOLIC CIRRHOSIS OF LIVER WITH ASCITES: ICD-10-CM

## 2021-05-26 DIAGNOSIS — K76.6 PORTAL HYPERTENSION: ICD-10-CM

## 2021-05-26 DIAGNOSIS — M79.676 PAIN OF TOE, UNSPECIFIED LATERALITY: ICD-10-CM

## 2021-05-26 DIAGNOSIS — K70.30 ALCOHOLIC CIRRHOSIS OF LIVER WITHOUT ASCITES: ICD-10-CM

## 2021-05-26 DIAGNOSIS — R16.0 LIVER MASS: ICD-10-CM

## 2021-05-26 DIAGNOSIS — G62.9 NEUROPATHY: ICD-10-CM

## 2021-05-26 DIAGNOSIS — I85.00 ESOPHAGEAL VARICES WITHOUT BLEEDING, UNSPECIFIED ESOPHAGEAL VARICES TYPE: ICD-10-CM

## 2021-05-26 PROCEDURE — 99214 PR OFFICE/OUTPT VISIT, EST, LEVL IV, 30-39 MIN: ICD-10-PCS | Mod: S$GLB,,, | Performed by: INTERNAL MEDICINE

## 2021-05-26 PROCEDURE — 99999 PR PBB SHADOW E&M-EST. PATIENT-LVL V: ICD-10-PCS | Mod: PBBFAC,,, | Performed by: INTERNAL MEDICINE

## 2021-05-26 PROCEDURE — 1125F AMNT PAIN NOTED PAIN PRSNT: CPT | Mod: S$GLB,,, | Performed by: INTERNAL MEDICINE

## 2021-05-26 PROCEDURE — 99214 OFFICE O/P EST MOD 30 MIN: CPT | Mod: S$GLB,,, | Performed by: INTERNAL MEDICINE

## 2021-05-26 PROCEDURE — 99999 PR PBB SHADOW E&M-EST. PATIENT-LVL V: CPT | Mod: PBBFAC,,, | Performed by: INTERNAL MEDICINE

## 2021-05-26 PROCEDURE — 3008F BODY MASS INDEX DOCD: CPT | Mod: CPTII,S$GLB,, | Performed by: INTERNAL MEDICINE

## 2021-05-26 PROCEDURE — 1125F PR PAIN SEVERITY QUANTIFIED, PAIN PRESENT: ICD-10-PCS | Mod: S$GLB,,, | Performed by: INTERNAL MEDICINE

## 2021-05-26 PROCEDURE — 3008F PR BODY MASS INDEX (BMI) DOCUMENTED: ICD-10-PCS | Mod: CPTII,S$GLB,, | Performed by: INTERNAL MEDICINE

## 2021-05-26 RX ORDER — FUROSEMIDE 40 MG/1
20 TABLET ORAL DAILY
Qty: 15 TABLET | Refills: 11 | Status: SHIPPED | OUTPATIENT
Start: 2021-05-26 | End: 2022-01-07

## 2021-05-26 RX ORDER — PNV NO.95/FERROUS FUM/FOLIC AC 28MG-0.8MG
100 TABLET ORAL DAILY
COMMUNITY

## 2021-05-26 RX ORDER — GABAPENTIN 300 MG/1
CAPSULE ORAL
Qty: 150 CAPSULE | Refills: 5 | Status: SHIPPED | OUTPATIENT
Start: 2021-05-26 | End: 2021-11-22

## 2021-05-26 RX ORDER — GABAPENTIN 300 MG/1
CAPSULE ORAL
Qty: 90 CAPSULE | Refills: 5 | Status: SHIPPED | OUTPATIENT
Start: 2021-05-26 | End: 2021-05-26 | Stop reason: SDUPTHER

## 2021-05-29 ENCOUNTER — HOSPITAL ENCOUNTER (OUTPATIENT)
Dept: RADIOLOGY | Facility: HOSPITAL | Age: 56
Discharge: HOME | End: 2021-05-29
Attending: INTERNAL MEDICINE
Payer: COMMERCIAL

## 2021-05-29 DIAGNOSIS — K76.9 LIVER LESION: ICD-10-CM

## 2021-05-29 PROCEDURE — A9585 GADOBUTROL INJECTION: HCPCS | Performed by: INTERNAL MEDICINE

## 2021-05-29 PROCEDURE — 74183 MRI ABDOMEN W WO CONTRAST: ICD-10-PCS | Mod: 26,,, | Performed by: RADIOLOGY

## 2021-05-29 PROCEDURE — 74183 MRI ABD W/O CNTR FLWD CNTR: CPT | Mod: TC

## 2021-05-29 PROCEDURE — 74183 MRI ABD W/O CNTR FLWD CNTR: CPT | Mod: 26,,, | Performed by: RADIOLOGY

## 2021-05-29 PROCEDURE — 25500020 PHARM REV CODE 255: Performed by: INTERNAL MEDICINE

## 2021-05-29 RX ORDER — GADOBUTROL 604.72 MG/ML
10 INJECTION INTRAVENOUS
Status: COMPLETED | OUTPATIENT
Start: 2021-05-29 | End: 2021-05-29

## 2021-05-29 RX ADMIN — GADOBUTROL 10 ML: 604.72 INJECTION INTRAVENOUS at 12:05

## 2021-05-30 DIAGNOSIS — K76.9 LIVER LESION: Primary | ICD-10-CM

## 2021-05-31 ENCOUNTER — TELEPHONE (OUTPATIENT)
Dept: HEPATOLOGY | Facility: CLINIC | Age: 56
End: 2021-05-31

## 2021-07-23 ENCOUNTER — LAB VISIT (OUTPATIENT)
Dept: LAB | Facility: HOSPITAL | Age: 56
End: 2021-07-23
Attending: INTERNAL MEDICINE
Payer: COMMERCIAL

## 2021-07-23 DIAGNOSIS — K70.30 ALCOHOLIC CIRRHOSIS OF LIVER WITHOUT ASCITES: ICD-10-CM

## 2021-07-23 LAB
ALBUMIN SERPL BCP-MCNC: 3.9 G/DL (ref 3.5–5.2)
ALP SERPL-CCNC: 174 U/L (ref 55–135)
ALT SERPL W/O P-5'-P-CCNC: 27 U/L (ref 10–44)
ANION GAP SERPL CALC-SCNC: 12 MMOL/L (ref 8–16)
AST SERPL-CCNC: 27 U/L (ref 10–40)
BASOPHILS # BLD AUTO: 0.05 K/UL (ref 0–0.2)
BASOPHILS NFR BLD: 0.7 % (ref 0–1.9)
BILIRUB SERPL-MCNC: 1.7 MG/DL (ref 0.1–1)
BUN SERPL-MCNC: 14 MG/DL (ref 6–20)
CALCIUM SERPL-MCNC: 9.7 MG/DL (ref 8.7–10.5)
CHLORIDE SERPL-SCNC: 90 MMOL/L (ref 95–110)
CO2 SERPL-SCNC: 29 MMOL/L (ref 23–29)
CREAT SERPL-MCNC: 0.8 MG/DL (ref 0.5–1.4)
DIFFERENTIAL METHOD: ABNORMAL
EOSINOPHIL # BLD AUTO: 0.5 K/UL (ref 0–0.5)
EOSINOPHIL NFR BLD: 6.5 % (ref 0–8)
ERYTHROCYTE [DISTWIDTH] IN BLOOD BY AUTOMATED COUNT: 12.2 % (ref 11.5–14.5)
EST. GFR  (AFRICAN AMERICAN): >60 ML/MIN/1.73 M^2
EST. GFR  (NON AFRICAN AMERICAN): >60 ML/MIN/1.73 M^2
GLUCOSE SERPL-MCNC: 132 MG/DL (ref 70–110)
HCT VFR BLD AUTO: 41.4 % (ref 37–48.5)
HGB BLD-MCNC: 14 G/DL (ref 12–16)
IMM GRANULOCYTES # BLD AUTO: 0.04 K/UL (ref 0–0.04)
IMM GRANULOCYTES NFR BLD AUTO: 0.5 % (ref 0–0.5)
INR PPP: 1.1 (ref 0.8–1.2)
LYMPHOCYTES # BLD AUTO: 1.5 K/UL (ref 1–4.8)
LYMPHOCYTES NFR BLD: 20.8 % (ref 18–48)
MCH RBC QN AUTO: 33.7 PG (ref 27–31)
MCHC RBC AUTO-ENTMCNC: 33.8 G/DL (ref 32–36)
MCV RBC AUTO: 100 FL (ref 82–98)
MONOCYTES # BLD AUTO: 0.7 K/UL (ref 0.3–1)
MONOCYTES NFR BLD: 9.8 % (ref 4–15)
NEUTROPHILS # BLD AUTO: 4.5 K/UL (ref 1.8–7.7)
NEUTROPHILS NFR BLD: 61.7 % (ref 38–73)
NRBC BLD-RTO: 0 /100 WBC
PLATELET # BLD AUTO: 126 K/UL (ref 150–450)
PMV BLD AUTO: 10.2 FL (ref 9.2–12.9)
POTASSIUM SERPL-SCNC: 3.8 MMOL/L (ref 3.5–5.1)
PROT SERPL-MCNC: 7.5 G/DL (ref 6–8.4)
PROTHROMBIN TIME: 11.7 SEC (ref 9–12.5)
RBC # BLD AUTO: 4.15 M/UL (ref 4–5.4)
SODIUM SERPL-SCNC: 131 MMOL/L (ref 136–145)
WBC # BLD AUTO: 7.35 K/UL (ref 3.9–12.7)

## 2021-07-23 PROCEDURE — 85610 PROTHROMBIN TIME: CPT | Performed by: INTERNAL MEDICINE

## 2021-07-23 PROCEDURE — 85025 COMPLETE CBC W/AUTO DIFF WBC: CPT | Performed by: INTERNAL MEDICINE

## 2021-07-23 PROCEDURE — 80053 COMPREHEN METABOLIC PANEL: CPT | Performed by: INTERNAL MEDICINE

## 2021-07-23 PROCEDURE — 36415 COLL VENOUS BLD VENIPUNCTURE: CPT | Mod: PN | Performed by: INTERNAL MEDICINE

## 2021-07-23 PROCEDURE — 80321 ALCOHOLS BIOMARKERS 1OR 2: CPT | Performed by: INTERNAL MEDICINE

## 2021-08-04 LAB — PHOSPHATIDYLETHANOL (PETH): 269 NG/ML

## 2021-08-16 DIAGNOSIS — K70.31 ALCOHOLIC CIRRHOSIS OF LIVER WITH ASCITES: ICD-10-CM

## 2021-08-16 DIAGNOSIS — R18.8 OTHER ASCITES: ICD-10-CM

## 2021-08-16 DIAGNOSIS — Z76.82 ORGAN TRANSPLANT CANDIDATE: ICD-10-CM

## 2021-08-16 RX ORDER — SPIRONOLACTONE 100 MG/1
200 TABLET, FILM COATED ORAL DAILY
Qty: 60 TABLET | Refills: 11 | Status: SHIPPED | OUTPATIENT
Start: 2021-08-16 | End: 2022-08-03

## 2021-08-23 ENCOUNTER — HOSPITAL ENCOUNTER (OUTPATIENT)
Dept: RADIOLOGY | Facility: HOSPITAL | Age: 56
Discharge: HOME | End: 2021-08-23
Attending: INTERNAL MEDICINE
Payer: COMMERCIAL

## 2021-08-23 DIAGNOSIS — K76.9 LIVER LESION: ICD-10-CM

## 2021-08-23 PROCEDURE — 76700 US ABDOMEN COMPLETE: ICD-10-PCS | Mod: 26,,, | Performed by: INTERNAL MEDICINE

## 2021-08-23 PROCEDURE — 76700 US EXAM ABDOM COMPLETE: CPT | Mod: TC

## 2021-08-23 PROCEDURE — 76700 US EXAM ABDOM COMPLETE: CPT | Mod: 26,,, | Performed by: INTERNAL MEDICINE

## 2021-08-26 ENCOUNTER — OFFICE VISIT (OUTPATIENT)
Dept: HEPATOLOGY | Facility: CLINIC | Age: 56
End: 2021-08-26
Payer: COMMERCIAL

## 2021-08-26 VITALS
SYSTOLIC BLOOD PRESSURE: 133 MMHG | HEART RATE: 106 BPM | RESPIRATION RATE: 17 BRPM | TEMPERATURE: 98 F | WEIGHT: 178.88 LBS | BODY MASS INDEX: 30.54 KG/M2 | HEIGHT: 64 IN | DIASTOLIC BLOOD PRESSURE: 77 MMHG | OXYGEN SATURATION: 97 %

## 2021-08-26 DIAGNOSIS — R18.8 OTHER ASCITES: ICD-10-CM

## 2021-08-26 DIAGNOSIS — R16.0 LIVER MASS: ICD-10-CM

## 2021-08-26 DIAGNOSIS — K74.60 HEPATIC CIRRHOSIS, UNSPECIFIED HEPATIC CIRRHOSIS TYPE, UNSPECIFIED WHETHER ASCITES PRESENT: Primary | ICD-10-CM

## 2021-08-26 DIAGNOSIS — K70.30 ALCOHOLIC CIRRHOSIS OF LIVER WITHOUT ASCITES: ICD-10-CM

## 2021-08-26 PROCEDURE — 3078F PR MOST RECENT DIASTOLIC BLOOD PRESSURE < 80 MM HG: ICD-10-PCS | Mod: CPTII,S$GLB,, | Performed by: INTERNAL MEDICINE

## 2021-08-26 PROCEDURE — 1160F PR REVIEW ALL MEDS BY PRESCRIBER/CLIN PHARMACIST DOCUMENTED: ICD-10-PCS | Mod: CPTII,S$GLB,, | Performed by: INTERNAL MEDICINE

## 2021-08-26 PROCEDURE — 1159F PR MEDICATION LIST DOCUMENTED IN MEDICAL RECORD: ICD-10-PCS | Mod: CPTII,S$GLB,, | Performed by: INTERNAL MEDICINE

## 2021-08-26 PROCEDURE — 3008F PR BODY MASS INDEX (BMI) DOCUMENTED: ICD-10-PCS | Mod: CPTII,S$GLB,, | Performed by: INTERNAL MEDICINE

## 2021-08-26 PROCEDURE — 99214 PR OFFICE/OUTPT VISIT, EST, LEVL IV, 30-39 MIN: ICD-10-PCS | Mod: S$GLB,,, | Performed by: INTERNAL MEDICINE

## 2021-08-26 PROCEDURE — 3075F PR MOST RECENT SYSTOLIC BLOOD PRESS GE 130-139MM HG: ICD-10-PCS | Mod: CPTII,S$GLB,, | Performed by: INTERNAL MEDICINE

## 2021-08-26 PROCEDURE — 99999 PR PBB SHADOW E&M-EST. PATIENT-LVL V: ICD-10-PCS | Mod: PBBFAC,,, | Performed by: INTERNAL MEDICINE

## 2021-08-26 PROCEDURE — 99999 PR PBB SHADOW E&M-EST. PATIENT-LVL V: CPT | Mod: PBBFAC,,, | Performed by: INTERNAL MEDICINE

## 2021-08-26 PROCEDURE — 1160F RVW MEDS BY RX/DR IN RCRD: CPT | Mod: CPTII,S$GLB,, | Performed by: INTERNAL MEDICINE

## 2021-08-26 PROCEDURE — 3008F BODY MASS INDEX DOCD: CPT | Mod: CPTII,S$GLB,, | Performed by: INTERNAL MEDICINE

## 2021-08-26 PROCEDURE — 3075F SYST BP GE 130 - 139MM HG: CPT | Mod: CPTII,S$GLB,, | Performed by: INTERNAL MEDICINE

## 2021-08-26 PROCEDURE — 99214 OFFICE O/P EST MOD 30 MIN: CPT | Mod: S$GLB,,, | Performed by: INTERNAL MEDICINE

## 2021-08-26 PROCEDURE — 3078F DIAST BP <80 MM HG: CPT | Mod: CPTII,S$GLB,, | Performed by: INTERNAL MEDICINE

## 2021-08-26 PROCEDURE — 1159F MED LIST DOCD IN RCRD: CPT | Mod: CPTII,S$GLB,, | Performed by: INTERNAL MEDICINE

## 2021-12-13 ENCOUNTER — HOSPITAL ENCOUNTER (OUTPATIENT)
Dept: RADIOLOGY | Facility: HOSPITAL | Age: 56
Discharge: HOME | End: 2021-12-13
Attending: INTERNAL MEDICINE
Payer: COMMERCIAL

## 2021-12-13 DIAGNOSIS — K74.60 HEPATIC CIRRHOSIS, UNSPECIFIED HEPATIC CIRRHOSIS TYPE, UNSPECIFIED WHETHER ASCITES PRESENT: ICD-10-CM

## 2021-12-13 LAB
CREAT SERPL-MCNC: 0.9 MG/DL (ref 0.5–1.4)
SAMPLE: NORMAL

## 2021-12-13 PROCEDURE — 25500020 PHARM REV CODE 255: Performed by: INTERNAL MEDICINE

## 2021-12-13 PROCEDURE — A9585 GADOBUTROL INJECTION: HCPCS | Performed by: INTERNAL MEDICINE

## 2021-12-13 PROCEDURE — 74183 MRI ABD W/O CNTR FLWD CNTR: CPT | Mod: TC

## 2021-12-13 PROCEDURE — 74183 MRI ABD W/O CNTR FLWD CNTR: CPT | Mod: 26,,, | Performed by: RADIOLOGY

## 2021-12-13 PROCEDURE — 74183 MRI ABDOMEN W WO CONTRAST: ICD-10-PCS | Mod: 26,,, | Performed by: RADIOLOGY

## 2021-12-13 RX ORDER — GADOBUTROL 604.72 MG/ML
10 INJECTION INTRAVENOUS
Status: COMPLETED | OUTPATIENT
Start: 2021-12-13 | End: 2021-12-13

## 2021-12-13 RX ADMIN — GADOBUTROL 10 ML: 604.72 INJECTION INTRAVENOUS at 10:12

## 2021-12-16 ENCOUNTER — TELEPHONE (OUTPATIENT)
Dept: HEPATOLOGY | Facility: CLINIC | Age: 56
End: 2021-12-16
Payer: COMMERCIAL

## 2022-01-06 ENCOUNTER — TELEPHONE (OUTPATIENT)
Dept: HEPATOLOGY | Facility: CLINIC | Age: 57
End: 2022-01-06
Payer: COMMERCIAL

## 2022-01-06 ENCOUNTER — LAB VISIT (OUTPATIENT)
Dept: LAB | Facility: HOSPITAL | Age: 57
End: 2022-01-06
Attending: INTERNAL MEDICINE
Payer: COMMERCIAL

## 2022-01-06 DIAGNOSIS — K74.60 HEPATIC CIRRHOSIS, UNSPECIFIED HEPATIC CIRRHOSIS TYPE, UNSPECIFIED WHETHER ASCITES PRESENT: ICD-10-CM

## 2022-01-06 LAB
ALBUMIN SERPL BCP-MCNC: 4.1 G/DL (ref 3.5–5.2)
ALP SERPL-CCNC: 194 U/L (ref 55–135)
ALT SERPL W/O P-5'-P-CCNC: 25 U/L (ref 10–44)
ANION GAP SERPL CALC-SCNC: ABNORMAL MMOL/L (ref 8–16)
AST SERPL-CCNC: 26 U/L (ref 10–40)
BASOPHILS # BLD AUTO: 0.06 K/UL (ref 0–0.2)
BASOPHILS NFR BLD: 0.6 % (ref 0–1.9)
BILIRUB SERPL-MCNC: 3.2 MG/DL (ref 0.1–1)
BUN SERPL-MCNC: 22 MG/DL (ref 6–20)
CALCIUM SERPL-MCNC: 10.1 MG/DL (ref 8.7–10.5)
CHLORIDE SERPL-SCNC: <70 MMOL/L (ref 95–110)
CO2 SERPL-SCNC: 40 MMOL/L (ref 23–29)
CREAT SERPL-MCNC: 1.1 MG/DL (ref 0.5–1.4)
DIFFERENTIAL METHOD: ABNORMAL
EOSINOPHIL # BLD AUTO: 0.3 K/UL (ref 0–0.5)
EOSINOPHIL NFR BLD: 3.3 % (ref 0–8)
ERYTHROCYTE [DISTWIDTH] IN BLOOD BY AUTOMATED COUNT: 11.9 % (ref 11.5–14.5)
EST. GFR  (AFRICAN AMERICAN): >60 ML/MIN/1.73 M^2
EST. GFR  (NON AFRICAN AMERICAN): 56.3 ML/MIN/1.73 M^2
GLUCOSE SERPL-MCNC: 383 MG/DL (ref 70–110)
HCT VFR BLD AUTO: 43.8 % (ref 37–48.5)
HGB BLD-MCNC: 15.8 G/DL (ref 12–16)
IMM GRANULOCYTES # BLD AUTO: 0.03 K/UL (ref 0–0.04)
IMM GRANULOCYTES NFR BLD AUTO: 0.3 % (ref 0–0.5)
INR PPP: 1.1 (ref 0.8–1.2)
LYMPHOCYTES # BLD AUTO: 1.4 K/UL (ref 1–4.8)
LYMPHOCYTES NFR BLD: 15.3 % (ref 18–48)
MCH RBC QN AUTO: 34.9 PG (ref 27–31)
MCHC RBC AUTO-ENTMCNC: 36.1 G/DL (ref 32–36)
MCV RBC AUTO: 97 FL (ref 82–98)
MONOCYTES # BLD AUTO: 1.1 K/UL (ref 0.3–1)
MONOCYTES NFR BLD: 11.3 % (ref 4–15)
NEUTROPHILS # BLD AUTO: 6.4 K/UL (ref 1.8–7.7)
NEUTROPHILS NFR BLD: 69.2 % (ref 38–73)
NRBC BLD-RTO: 0 /100 WBC
PLATELET # BLD AUTO: 175 K/UL (ref 150–450)
PMV BLD AUTO: 10.4 FL (ref 9.2–12.9)
POTASSIUM SERPL-SCNC: 2.2 MMOL/L (ref 3.5–5.1)
PROT SERPL-MCNC: 7.9 G/DL (ref 6–8.4)
PROTHROMBIN TIME: 11.7 SEC (ref 9–12.5)
RBC # BLD AUTO: 4.53 M/UL (ref 4–5.4)
SODIUM SERPL-SCNC: 123 MMOL/L (ref 136–145)
WBC # BLD AUTO: 9.31 K/UL (ref 3.9–12.7)

## 2022-01-06 PROCEDURE — 36415 COLL VENOUS BLD VENIPUNCTURE: CPT | Mod: PN | Performed by: INTERNAL MEDICINE

## 2022-01-06 PROCEDURE — 85025 COMPLETE CBC W/AUTO DIFF WBC: CPT | Performed by: INTERNAL MEDICINE

## 2022-01-06 PROCEDURE — 80321 ALCOHOLS BIOMARKERS 1OR 2: CPT | Performed by: INTERNAL MEDICINE

## 2022-01-06 PROCEDURE — 85610 PROTHROMBIN TIME: CPT | Performed by: INTERNAL MEDICINE

## 2022-01-06 PROCEDURE — 80053 COMPREHEN METABOLIC PANEL: CPT | Performed by: INTERNAL MEDICINE

## 2022-01-06 NOTE — TELEPHONE ENCOUNTER
Called pt to go to ER for hypokalemia, hyponatremia, elevated Tbil, hyperglycemia; MELD 24.    Pt unsure if she will go. I emphasized that she needs to go.

## 2022-01-07 ENCOUNTER — LAB VISIT (OUTPATIENT)
Dept: LAB | Facility: HOSPITAL | Age: 57
End: 2022-01-07
Attending: INTERNAL MEDICINE
Payer: COMMERCIAL

## 2022-01-07 ENCOUNTER — TELEPHONE (OUTPATIENT)
Dept: HEPATOLOGY | Facility: CLINIC | Age: 57
End: 2022-01-07
Payer: COMMERCIAL

## 2022-01-07 ENCOUNTER — OFFICE VISIT (OUTPATIENT)
Dept: HEPATOLOGY | Facility: CLINIC | Age: 57
End: 2022-01-07
Payer: COMMERCIAL

## 2022-01-07 VITALS
OXYGEN SATURATION: 97 % | HEIGHT: 64 IN | SYSTOLIC BLOOD PRESSURE: 129 MMHG | TEMPERATURE: 98 F | WEIGHT: 179.56 LBS | RESPIRATION RATE: 17 BRPM | DIASTOLIC BLOOD PRESSURE: 72 MMHG | HEART RATE: 100 BPM | BODY MASS INDEX: 30.66 KG/M2

## 2022-01-07 DIAGNOSIS — R73.9 HYPERGLYCEMIA: ICD-10-CM

## 2022-01-07 DIAGNOSIS — E87.6 HYPOKALEMIA: Primary | ICD-10-CM

## 2022-01-07 DIAGNOSIS — K74.60 HEPATIC CIRRHOSIS, UNSPECIFIED HEPATIC CIRRHOSIS TYPE, UNSPECIFIED WHETHER ASCITES PRESENT: ICD-10-CM

## 2022-01-07 DIAGNOSIS — I85.00 ESOPHAGEAL VARICES WITHOUT BLEEDING, UNSPECIFIED ESOPHAGEAL VARICES TYPE: ICD-10-CM

## 2022-01-07 DIAGNOSIS — R16.0 LIVER MASS: ICD-10-CM

## 2022-01-07 DIAGNOSIS — K70.30 ALCOHOLIC CIRRHOSIS OF LIVER WITHOUT ASCITES: ICD-10-CM

## 2022-01-07 DIAGNOSIS — R18.8 OTHER ASCITES: ICD-10-CM

## 2022-01-07 LAB
ALBUMIN SERPL BCP-MCNC: 3.8 G/DL (ref 3.5–5.2)
ALP SERPL-CCNC: 176 U/L (ref 55–135)
ALT SERPL W/O P-5'-P-CCNC: 26 U/L (ref 10–44)
ANION GAP SERPL CALC-SCNC: 12 MMOL/L (ref 8–16)
AST SERPL-CCNC: 28 U/L (ref 10–40)
BASOPHILS # BLD AUTO: 0.06 K/UL (ref 0–0.2)
BASOPHILS NFR BLD: 0.8 % (ref 0–1.9)
BILIRUB SERPL-MCNC: 2.5 MG/DL (ref 0.1–1)
BUN SERPL-MCNC: 14 MG/DL (ref 6–20)
CALCIUM SERPL-MCNC: 10.7 MG/DL (ref 8.7–10.5)
CHLORIDE SERPL-SCNC: 75 MMOL/L (ref 95–110)
CO2 SERPL-SCNC: 40 MMOL/L (ref 23–29)
CREAT SERPL-MCNC: 0.9 MG/DL (ref 0.5–1.4)
DIFFERENTIAL METHOD: ABNORMAL
EOSINOPHIL # BLD AUTO: 0.3 K/UL (ref 0–0.5)
EOSINOPHIL NFR BLD: 3.5 % (ref 0–8)
ERYTHROCYTE [DISTWIDTH] IN BLOOD BY AUTOMATED COUNT: 11.9 % (ref 11.5–14.5)
EST. GFR  (AFRICAN AMERICAN): >60 ML/MIN/1.73 M^2
EST. GFR  (NON AFRICAN AMERICAN): >60 ML/MIN/1.73 M^2
GLUCOSE SERPL-MCNC: 379 MG/DL (ref 70–110)
HCT VFR BLD AUTO: 42.7 % (ref 37–48.5)
HGB BLD-MCNC: 15.1 G/DL (ref 12–16)
IMM GRANULOCYTES # BLD AUTO: 0.03 K/UL (ref 0–0.04)
IMM GRANULOCYTES NFR BLD AUTO: 0.4 % (ref 0–0.5)
INR PPP: 1.1 (ref 0.8–1.2)
LYMPHOCYTES # BLD AUTO: 1.3 K/UL (ref 1–4.8)
LYMPHOCYTES NFR BLD: 16.1 % (ref 18–48)
MCH RBC QN AUTO: 35.1 PG (ref 27–31)
MCHC RBC AUTO-ENTMCNC: 35.4 G/DL (ref 32–36)
MCV RBC AUTO: 99 FL (ref 82–98)
MONOCYTES # BLD AUTO: 1 K/UL (ref 0.3–1)
MONOCYTES NFR BLD: 12.4 % (ref 4–15)
NEUTROPHILS # BLD AUTO: 5.2 K/UL (ref 1.8–7.7)
NEUTROPHILS NFR BLD: 66.8 % (ref 38–73)
NRBC BLD-RTO: 0 /100 WBC
PLATELET # BLD AUTO: 157 K/UL (ref 150–450)
PMV BLD AUTO: 10.1 FL (ref 9.2–12.9)
POTASSIUM SERPL-SCNC: 2.7 MMOL/L (ref 3.5–5.1)
PROT SERPL-MCNC: 7.5 G/DL (ref 6–8.4)
PROTHROMBIN TIME: 11.6 SEC (ref 9–12.5)
RBC # BLD AUTO: 4.3 M/UL (ref 4–5.4)
SODIUM SERPL-SCNC: 127 MMOL/L (ref 136–145)
WBC # BLD AUTO: 7.77 K/UL (ref 3.9–12.7)

## 2022-01-07 PROCEDURE — 3074F PR MOST RECENT SYSTOLIC BLOOD PRESSURE < 130 MM HG: ICD-10-PCS | Mod: CPTII,S$GLB,, | Performed by: INTERNAL MEDICINE

## 2022-01-07 PROCEDURE — 1160F RVW MEDS BY RX/DR IN RCRD: CPT | Mod: CPTII,S$GLB,, | Performed by: INTERNAL MEDICINE

## 2022-01-07 PROCEDURE — 99214 OFFICE O/P EST MOD 30 MIN: CPT | Mod: S$GLB,,, | Performed by: INTERNAL MEDICINE

## 2022-01-07 PROCEDURE — 1159F PR MEDICATION LIST DOCUMENTED IN MEDICAL RECORD: ICD-10-PCS | Mod: CPTII,S$GLB,, | Performed by: INTERNAL MEDICINE

## 2022-01-07 PROCEDURE — 1159F MED LIST DOCD IN RCRD: CPT | Mod: CPTII,S$GLB,, | Performed by: INTERNAL MEDICINE

## 2022-01-07 PROCEDURE — 3008F BODY MASS INDEX DOCD: CPT | Mod: CPTII,S$GLB,, | Performed by: INTERNAL MEDICINE

## 2022-01-07 PROCEDURE — 99999 PR PBB SHADOW E&M-EST. PATIENT-LVL V: CPT | Mod: PBBFAC,,, | Performed by: INTERNAL MEDICINE

## 2022-01-07 PROCEDURE — 3074F SYST BP LT 130 MM HG: CPT | Mod: CPTII,S$GLB,, | Performed by: INTERNAL MEDICINE

## 2022-01-07 PROCEDURE — 99999 PR PBB SHADOW E&M-EST. PATIENT-LVL V: ICD-10-PCS | Mod: PBBFAC,,, | Performed by: INTERNAL MEDICINE

## 2022-01-07 PROCEDURE — 3078F DIAST BP <80 MM HG: CPT | Mod: CPTII,S$GLB,, | Performed by: INTERNAL MEDICINE

## 2022-01-07 PROCEDURE — 85025 COMPLETE CBC W/AUTO DIFF WBC: CPT | Performed by: INTERNAL MEDICINE

## 2022-01-07 PROCEDURE — 3008F PR BODY MASS INDEX (BMI) DOCUMENTED: ICD-10-PCS | Mod: CPTII,S$GLB,, | Performed by: INTERNAL MEDICINE

## 2022-01-07 PROCEDURE — 3078F PR MOST RECENT DIASTOLIC BLOOD PRESSURE < 80 MM HG: ICD-10-PCS | Mod: CPTII,S$GLB,, | Performed by: INTERNAL MEDICINE

## 2022-01-07 PROCEDURE — 99214 PR OFFICE/OUTPT VISIT, EST, LEVL IV, 30-39 MIN: ICD-10-PCS | Mod: S$GLB,,, | Performed by: INTERNAL MEDICINE

## 2022-01-07 PROCEDURE — 80053 COMPREHEN METABOLIC PANEL: CPT | Performed by: INTERNAL MEDICINE

## 2022-01-07 PROCEDURE — 85610 PROTHROMBIN TIME: CPT | Performed by: INTERNAL MEDICINE

## 2022-01-07 PROCEDURE — 1160F PR REVIEW ALL MEDS BY PRESCRIBER/CLIN PHARMACIST DOCUMENTED: ICD-10-PCS | Mod: CPTII,S$GLB,, | Performed by: INTERNAL MEDICINE

## 2022-01-07 PROCEDURE — 36415 COLL VENOUS BLD VENIPUNCTURE: CPT | Mod: PN | Performed by: INTERNAL MEDICINE

## 2022-01-07 RX ORDER — POTASSIUM CHLORIDE 20 MEQ/1
40 TABLET, EXTENDED RELEASE ORAL ONCE
Qty: 2 TABLET | Refills: 0 | Status: SHIPPED | OUTPATIENT
Start: 2022-01-07 | End: 2022-01-07

## 2022-01-07 RX ORDER — INSULIN GLARGINE 100 [IU]/ML
INJECTION, SOLUTION SUBCUTANEOUS
Qty: 10 EACH | Refills: 0 | Status: ON HOLD | OUTPATIENT
Start: 2022-01-07 | End: 2022-01-13 | Stop reason: HOSPADM

## 2022-01-07 NOTE — TELEPHONE ENCOUNTER
Just took a call from the lab with a critical value for . Brunilda  Potassium 2.7  Giving the results to Dr. Serna now.  Patient is scheduled to see Dr. Serna today at 430

## 2022-01-07 NOTE — TELEPHONE ENCOUNTER
----- Message from Soraida Serna MD sent at 1/6/2022  5:01 PM CST -----  MELD-Na score: 24   K low, Na low, Tbil elevated  I called her recommending she go to the ER

## 2022-01-07 NOTE — PROGRESS NOTES
HEPATOLOGY FOLLOW UP    Referring Physician: Brett Michele MD  Current Corresponding Physician: Brett Michele MD    Brunilda Delgadillo is here for follow up of Cirrhosis  She is a 54 y.o. 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 She has a 14 and 17 year old who live at home.     --pt has drank alcohol most of her adult life; last drink ~1 month ago  --developed ascites June 2019 and has had 3 paracenteses  --no HE  --PHG but no varices on EGD 11/16        Interval History  Since Brunilda Delgadillo's last visit she continues to drink alcohol. She states she drinks to treat the pain in her feet caused by neuropathy, although pain better on neurontin.    Ascites:  She continues on spironolactone, lasix and metolazone every other day. Her last LVP was 3.8 L on 10/21/19 (LVPs (8/29/19, 9/12/19, 10/21/19). Unfortunately her K is in the low to mid 2 range, BS are close to 400, Tbi is elevated and Na was 123 (today 127). In part the hyponatremia is due to high BSs. K is probably low due to diureitcs.    HE: no symptoms  EGD 2/20: normal esophagus    Liver lesion:  MRI 5/29/21: Hepatic cirrhosis with sequela of portal venous hypertension in the form of splenomegaly and left upper abdominal collateral vascularization; Redemonstration of a subcentimeter nodule of T1 hyperintense signal at the hepatic dome.  No lesions concerning for hepatocellular carcinoma.  Abdo US 8/23/21: The hypoechoic lesion seen within the liver on previous ultrasound is not well visualized sonographically today; small hepatic cysts  MRI abdo w wo contrast 12/13/21: Liver: Nodular contour and heterogeneous parenchyma consistent with cirrhosis.  No arterial hyperenhancing lesion.  No contrast washout or pseudo capsule.  Stable subcentimeter cyst of the right hepatic dome.  Hepatic and portal veins are patent.    She remains medically early for liver transplant.     MELD-Na score: 11 at  "1/7/2022 11:35 AM  MELD score: 11 at 1/7/2022 11:35 AM  Calculated from:  Serum Creatinine: 0.9 mg/dL (Using min of 1 mg/dL) at 1/7/2022 11:35 AM  Serum Sodium: 127 mmol/L at 1/7/2022 11:35 AM  Total Bilirubin: 2.5 mg/dL at 1/7/2022 11:35 AM  INR(ratio): 1.1 at 1/7/2022 11:35 AM  Age: 56 years    Outpatient Encounter Medications as of 1/7/2022   Medication Sig Dispense Refill    alendronate-vitamin D3 (FOSAMAX PLUS D) 70 mg- 2,800 unit per tablet Take 1 tablet by mouth every 7 days.      cyanocobalamin (VITAMIN B-12) 100 MCG tablet Take 100 mcg by mouth once daily.      DULoxetine (CYMBALTA) 60 MG capsule Take 60 mg by mouth once daily.       folic acid (FOLVITE) 1 MG tablet TAKE 1 TABLET (1 MG TOTAL) BY MOUTH ONCE DAILY. 30 tablet 10    furosemide (LASIX) 40 MG tablet Take 0.5 tablets (20 mg total) by mouth once daily. 15 tablet 11    gabapentin (NEURONTIN) 300 MG capsule TAKE 1 CAPSULE IN THE MORNING AND 1 CAPSULE AT LUNCH AND 3 CAPSULE(S) AT NIGHT 150 capsule 4    insulin (LANTUS SOLOSTAR U-100 INSULIN) glargine 100 units/mL (3mL) SubQ pen 20 Units once daily. 20 units in am and 40 units at night      insulin detemir (LEVEMIR U-100 INSULIN SUBQ) Inject 40 Units into the skin once daily.      metOLazone (ZAROXOLYN) 2.5 MG tablet TAKE EVERY OTHER DAY 30 MINUTES PRIOR TO OTHER DIURETICS 30 tablet 10    multivit with minerals/lutein (MULTIVITAMIN 50 PLUS ORAL) Take by mouth.      pen needle, diabetic 30 gauge x 1/3" Ndle NovoFine 30 30 gauge x 1/3" needle      spironolactone (ALDACTONE) 100 MG tablet Take 2 tablets (200 mg total) by mouth once daily. 60 tablet 11    thiamine 100 MG tablet Take 100 mg by mouth.       No facility-administered encounter medications on file as of 1/7/2022.     Review of patient's allergies indicates:   Allergen Reactions    Penicillins      Rash as a child     Past Medical History:   Diagnosis Date    Anxiety disorder 8/21/2019    Ascites 8/21/2019    Cirrhosis     " Depression 8/21/2019    Diabetes mellitus     History of cellulitis 8/21/2019    Hyponatremia 8/21/2019    Portal hypertensive gastropathy 8/21/2019    On EGD 11/16    Type 2 diabetes mellitus 8/21/2019       Review of Systems   Constitutional: Negative.    HENT: Negative.    Eyes: Negative.    Respiratory: Negative.    Cardiovascular: Negative.    Gastrointestinal: Negative.    Genitourinary: Negative.    Musculoskeletal: Negative.    Skin: Negative.    Neurological: Negative.    Psychiatric/Behavioral: Negative.      Vitals:    01/07/22 1618   BP: 129/72   Pulse: 100   Resp: 17   Temp: 98.4 °F (36.9 °C)       Physical Exam  Vitals reviewed.   Constitutional:       Appearance: She is well-developed.   HENT:      Head: Normocephalic.   Eyes:      General: No scleral icterus.     Pupils: Pupils are equal, round, and reactive to light.   Neck:      Thyroid: No thyromegaly.   Cardiovascular:      Rate and Rhythm: Normal rate and regular rhythm.      Heart sounds: Normal heart sounds.   Pulmonary:      Effort: Pulmonary effort is normal.      Breath sounds: Normal breath sounds. No wheezing.   Abdominal:      General: There is no distension.      Palpations: Abdomen is soft. There is no mass.      Tenderness: There is no abdominal tenderness.   Musculoskeletal:         General: Normal range of motion.      Cervical back: Neck supple.   Skin:     General: Skin is warm and dry.      Findings: No rash.   Neurological:      Mental Status: She is oriented to person, place, and time.         Lab Results   Component Value Date     (H) 01/07/2022    BUN 14 01/07/2022    CREATININE 0.9 01/07/2022    CALCIUM 10.7 (H) 01/07/2022     (L) 01/07/2022    K 2.7 (LL) 01/07/2022    CL 75 (L) 01/07/2022    PROT 7.5 01/07/2022    CO2 40 (H) 01/07/2022    ANIONGAP 12 01/07/2022    WBC 7.77 01/07/2022    RBC 4.30 01/07/2022    HGB 15.1 01/07/2022    HCT 42.7 01/07/2022    MCV 99 (H) 01/07/2022    MCH 35.1 (H) 01/07/2022     MCHC 35.4 01/07/2022     Lab Results   Component Value Date    RDW 11.9 01/07/2022     01/07/2022    MPV 10.1 01/07/2022    GRAN 5.2 01/07/2022    GRAN 66.8 01/07/2022    LYMPH 1.3 01/07/2022    LYMPH 16.1 (L) 01/07/2022    MONO 1.0 01/07/2022    MONO 12.4 01/07/2022    EOSINOPHIL 3.5 01/07/2022    BASOPHIL 0.8 01/07/2022    EOS 0.3 01/07/2022    BASO 0.06 01/07/2022    GROUPTRH O POS 12/12/2019    ALBUMIN 3.8 01/07/2022    BILIDIR 0.7 (H) 12/12/2019    AST 28 01/07/2022    ALT 26 01/07/2022    ALKPHOS 176 (H) 01/07/2022    LABPROT 11.6 01/07/2022    INR 1.1 01/07/2022   MELD-Na score: 11 at 1/7/2022 11:35 AM  MELD score: 11 at 1/7/2022 11:35 AM  Calculated from:  Serum Creatinine: 0.9 mg/dL (Using min of 1 mg/dL) at 1/7/2022 11:35 AM  Serum Sodium: 127 mmol/L at 1/7/2022 11:35 AM  Total Bilirubin: 2.5 mg/dL at 1/7/2022 11:35 AM  INR(ratio): 1.1 at 1/7/2022 11:35 AM  Age: 56 years  Assessment and Plan:    Brunilda Delgadillo is a 56 y.o. female with Cirrhosis  Current recommendations:  1. Ascites/hypokalemia: need to hold lasix and metolazone; continue spironolactone and will given one dose of Kdur 40 meq today; repeat stat labs on Monday  2. Portal htn- no varices on EGD 2/20- repeat 2022  3. Cirrhosis, decompensated: meld labs every 4 weeks  4. Alcohol abuse, ongoing- counseled pt re abstinence  5. Liver lesion: not seen on MRI 12/21; abdo US 06/22  6. Neuropathy: continue gabapentin    Return 4 weeks    . .

## 2022-01-07 NOTE — TELEPHONE ENCOUNTER
Spoke with Mrs. Worley about going do new labs per Dr. Serna and she agreed. Would go to [facility] near her home

## 2022-01-07 NOTE — TELEPHONE ENCOUNTER
----- Message from Soraida Serna MD sent at 1/6/2022  5:01 PM CST -----  MELD-Na score: 24   K low, Na low, Tbil elevated  I called her recommending she go to the ER.    Dr Serna called patient on yesterday 1/6/22 regarding multiple critical lab values and elevated Meld Score.   Pt was encouraged to go to the ER.

## 2022-01-07 NOTE — PATIENT INSTRUCTIONS
1. Stop lasix and metolazone  2. Take kdur 40 meq orally x 1   3 continue spironoalctone  4. Stat labs on Monday  5. Abdo US in June 2022  6. Labs monthly for the liver

## 2022-01-10 ENCOUNTER — TELEPHONE (OUTPATIENT)
Dept: HEPATOLOGY | Facility: CLINIC | Age: 57
End: 2022-01-10
Payer: COMMERCIAL

## 2022-01-10 ENCOUNTER — HOSPITAL ENCOUNTER (INPATIENT)
Facility: HOSPITAL | Age: 57
LOS: 2 days | Discharge: HOME | DRG: 641 | End: 2022-01-13
Attending: EMERGENCY MEDICINE | Admitting: STUDENT IN AN ORGANIZED HEALTH CARE EDUCATION/TRAINING PROGRAM
Payer: COMMERCIAL

## 2022-01-10 DIAGNOSIS — R94.31 PROLONGED Q-T INTERVAL ON ECG: ICD-10-CM

## 2022-01-10 DIAGNOSIS — E87.6 HYPOKALEMIA: ICD-10-CM

## 2022-01-10 DIAGNOSIS — R07.9 CHEST PAIN: ICD-10-CM

## 2022-01-10 DIAGNOSIS — R73.9 HYPERGLYCEMIA: ICD-10-CM

## 2022-01-10 DIAGNOSIS — E11.69 TYPE 2 DIABETES MELLITUS WITH OTHER SPECIFIED COMPLICATION, WITH LONG-TERM CURRENT USE OF INSULIN: ICD-10-CM

## 2022-01-10 DIAGNOSIS — E87.6 HYPOKALEMIA: Primary | ICD-10-CM

## 2022-01-10 DIAGNOSIS — Z79.4 TYPE 2 DIABETES MELLITUS WITH OTHER SPECIFIED COMPLICATION, WITH LONG-TERM CURRENT USE OF INSULIN: ICD-10-CM

## 2022-01-10 DIAGNOSIS — E87.1 HYPONATREMIA: Primary | ICD-10-CM

## 2022-01-10 LAB
BASOPHILS # BLD AUTO: 0.06 K/UL (ref 0–0.2)
BASOPHILS NFR BLD: 0.8 % (ref 0–1.9)
CTP QC/QA: YES
DIFFERENTIAL METHOD: ABNORMAL
EOSINOPHIL # BLD AUTO: 0.4 K/UL (ref 0–0.5)
EOSINOPHIL NFR BLD: 4.9 % (ref 0–8)
ERYTHROCYTE [DISTWIDTH] IN BLOOD BY AUTOMATED COUNT: 11.6 % (ref 11.5–14.5)
HCT VFR BLD AUTO: 39 % (ref 37–48.5)
HGB BLD-MCNC: 14.1 G/DL (ref 12–16)
IMM GRANULOCYTES # BLD AUTO: 0.06 K/UL (ref 0–0.04)
IMM GRANULOCYTES NFR BLD AUTO: 0.8 % (ref 0–0.5)
LYMPHOCYTES # BLD AUTO: 1.9 K/UL (ref 1–4.8)
LYMPHOCYTES NFR BLD: 24 % (ref 18–48)
MCH RBC QN AUTO: 34.5 PG (ref 27–31)
MCHC RBC AUTO-ENTMCNC: 36.2 G/DL (ref 32–36)
MCV RBC AUTO: 95 FL (ref 82–98)
MONOCYTES # BLD AUTO: 0.7 K/UL (ref 0.3–1)
MONOCYTES NFR BLD: 9.3 % (ref 4–15)
NEUTROPHILS # BLD AUTO: 4.8 K/UL (ref 1.8–7.7)
NEUTROPHILS NFR BLD: 60.2 % (ref 38–73)
NRBC BLD-RTO: 0 /100 WBC
PLATELET # BLD AUTO: 163 K/UL (ref 150–450)
PMV BLD AUTO: 10.2 FL (ref 9.2–12.9)
RBC # BLD AUTO: 4.09 M/UL (ref 4–5.4)
SARS-COV-2 RDRP RESP QL NAA+PROBE: NEGATIVE
WBC # BLD AUTO: 7.92 K/UL (ref 3.9–12.7)

## 2022-01-10 PROCEDURE — 96367 TX/PROPH/DG ADDL SEQ IV INF: CPT

## 2022-01-10 PROCEDURE — 80053 COMPREHEN METABOLIC PANEL: CPT | Mod: 91 | Performed by: EMERGENCY MEDICINE

## 2022-01-10 PROCEDURE — 93010 ELECTROCARDIOGRAM REPORT: CPT | Mod: 76,,, | Performed by: INTERNAL MEDICINE

## 2022-01-10 PROCEDURE — 84100 ASSAY OF PHOSPHORUS: CPT | Performed by: EMERGENCY MEDICINE

## 2022-01-10 PROCEDURE — 96365 THER/PROPH/DIAG IV INF INIT: CPT

## 2022-01-10 PROCEDURE — 93005 ELECTROCARDIOGRAM TRACING: CPT

## 2022-01-10 PROCEDURE — U0002 COVID-19 LAB TEST NON-CDC: HCPCS | Performed by: EMERGENCY MEDICINE

## 2022-01-10 PROCEDURE — 85025 COMPLETE CBC W/AUTO DIFF WBC: CPT | Mod: 91 | Performed by: EMERGENCY MEDICINE

## 2022-01-10 PROCEDURE — 93010 ELECTROCARDIOGRAM REPORT: CPT | Mod: ,,, | Performed by: INTERNAL MEDICINE

## 2022-01-10 PROCEDURE — 63600175 PHARM REV CODE 636 W HCPCS: Performed by: EMERGENCY MEDICINE

## 2022-01-10 PROCEDURE — 25000003 PHARM REV CODE 250: Performed by: EMERGENCY MEDICINE

## 2022-01-10 PROCEDURE — 99285 EMERGENCY DEPT VISIT HI MDM: CPT | Mod: CS,,, | Performed by: EMERGENCY MEDICINE

## 2022-01-10 PROCEDURE — 83735 ASSAY OF MAGNESIUM: CPT | Performed by: EMERGENCY MEDICINE

## 2022-01-10 PROCEDURE — 93010 EKG 12-LEAD: ICD-10-PCS | Mod: 76,,, | Performed by: INTERNAL MEDICINE

## 2022-01-10 PROCEDURE — 99285 PR EMERGENCY DEPT VISIT,LEVEL V: ICD-10-PCS | Mod: CS,,, | Performed by: EMERGENCY MEDICINE

## 2022-01-10 PROCEDURE — 99285 EMERGENCY DEPT VISIT HI MDM: CPT | Mod: 25

## 2022-01-10 RX ORDER — POTASSIUM CHLORIDE 20 MEQ/1
60 TABLET, EXTENDED RELEASE ORAL DAILY
Qty: 6 TABLET | Refills: 0 | Status: ON HOLD | OUTPATIENT
Start: 2022-01-10 | End: 2022-01-13 | Stop reason: HOSPADM

## 2022-01-10 RX ORDER — POTASSIUM CHLORIDE 750 MG/1
10 CAPSULE, EXTENDED RELEASE ORAL ONCE
Status: COMPLETED | OUTPATIENT
Start: 2022-01-10 | End: 2022-01-10

## 2022-01-10 RX ORDER — MAGNESIUM SULFATE HEPTAHYDRATE 40 MG/ML
2 INJECTION, SOLUTION INTRAVENOUS
Status: COMPLETED | OUTPATIENT
Start: 2022-01-10 | End: 2022-01-10

## 2022-01-10 RX ORDER — POTASSIUM CHLORIDE 7.45 MG/ML
10 INJECTION INTRAVENOUS
Status: COMPLETED | OUTPATIENT
Start: 2022-01-10 | End: 2022-01-11

## 2022-01-10 RX ADMIN — POTASSIUM CHLORIDE 10 MEQ: 7.46 INJECTION, SOLUTION INTRAVENOUS at 11:01

## 2022-01-10 RX ADMIN — MAGNESIUM SULFATE 2 G: 2 INJECTION INTRAVENOUS at 11:01

## 2022-01-10 RX ADMIN — POTASSIUM CHLORIDE 10 MEQ: 10 CAPSULE, COATED, EXTENDED RELEASE ORAL at 11:01

## 2022-01-10 RX ADMIN — SODIUM CHLORIDE 1000 ML: 0.9 INJECTION, SOLUTION INTRAVENOUS at 11:01

## 2022-01-10 NOTE — TELEPHONE ENCOUNTER
Response from Secure Chat.    Pt's electrolytes remain critical-should go to ER; if pt refuses then  Pt to stop all diuretics  Take 60 meq kdur x 2 days; repeat stat labs on wed.    Received additional Secure Chat message from Dr Serna.  I have reached out to the patient to go to the ED.  You do not have to call her.

## 2022-01-10 NOTE — TELEPHONE ENCOUNTER
Call to pt- agreeable to going to ER for continued critical electrolyte disturbances      Received call from Arsen VAN with [facility] with critical lab values.  Potassium 2.2 and CO2 43. Read back and verified.  Will send the Provider a Secure Chat.

## 2022-01-10 NOTE — PROGRESS NOTES
Pt's electrolytes remain critical-should go to ER; if pt refuses then  Pt to stop all diuretics  Take 60 meq kdur x 2 days; repeat stat labs on wed

## 2022-01-11 PROBLEM — E87.6 HYPOKALEMIA: Status: ACTIVE | Noted: 2022-01-11

## 2022-01-11 LAB
ALBUMIN SERPL BCP-MCNC: 3.2 G/DL (ref 3.5–5.2)
ALBUMIN SERPL BCP-MCNC: 3.8 G/DL (ref 3.5–5.2)
ALP SERPL-CCNC: 144 U/L (ref 55–135)
ALP SERPL-CCNC: 176 U/L (ref 55–135)
ALT SERPL W/O P-5'-P-CCNC: 22 U/L (ref 10–44)
ALT SERPL W/O P-5'-P-CCNC: 26 U/L (ref 10–44)
ANION GAP SERPL CALC-SCNC: 11 MMOL/L (ref 8–16)
ANION GAP SERPL CALC-SCNC: 11 MMOL/L (ref 8–16)
ANION GAP SERPL CALC-SCNC: 7 MMOL/L (ref 8–16)
AST SERPL-CCNC: 25 U/L (ref 10–40)
AST SERPL-CCNC: 26 U/L (ref 10–40)
BILIRUB SERPL-MCNC: 1.7 MG/DL (ref 0.1–1)
BILIRUB SERPL-MCNC: 2.1 MG/DL (ref 0.1–1)
BUN SERPL-MCNC: 11 MG/DL (ref 6–20)
BUN SERPL-MCNC: 12 MG/DL (ref 6–20)
BUN SERPL-MCNC: 16 MG/DL (ref 6–20)
CALCIUM SERPL-MCNC: 8.4 MG/DL (ref 8.7–10.5)
CALCIUM SERPL-MCNC: 8.6 MG/DL (ref 8.7–10.5)
CALCIUM SERPL-MCNC: 9.4 MG/DL (ref 8.7–10.5)
CHLORIDE SERPL-SCNC: 80 MMOL/L (ref 95–110)
CHLORIDE SERPL-SCNC: 84 MMOL/L (ref 95–110)
CHLORIDE SERPL-SCNC: 87 MMOL/L (ref 95–110)
CO2 SERPL-SCNC: 32 MMOL/L (ref 23–29)
CO2 SERPL-SCNC: 33 MMOL/L (ref 23–29)
CO2 SERPL-SCNC: 35 MMOL/L (ref 23–29)
CREAT SERPL-MCNC: 0.7 MG/DL (ref 0.5–1.4)
CREAT SERPL-MCNC: 0.8 MG/DL (ref 0.5–1.4)
CREAT SERPL-MCNC: 0.8 MG/DL (ref 0.5–1.4)
CREAT UR-MCNC: 59 MG/DL (ref 15–325)
EST. GFR  (AFRICAN AMERICAN): >60 ML/MIN/1.73 M^2
EST. GFR  (NON AFRICAN AMERICAN): >60 ML/MIN/1.73 M^2
GLUCOSE SERPL-MCNC: 178 MG/DL (ref 70–110)
GLUCOSE SERPL-MCNC: 197 MG/DL (ref 70–110)
GLUCOSE SERPL-MCNC: 242 MG/DL (ref 70–110)
MAGNESIUM SERPL-MCNC: 1.9 MG/DL (ref 1.6–2.6)
MAGNESIUM SERPL-MCNC: 2.1 MG/DL (ref 1.6–2.6)
PHOSPHATE SERPL-MCNC: 1.8 MG/DL (ref 2.7–4.5)
POCT GLUCOSE: 158 MG/DL (ref 70–110)
POCT GLUCOSE: 160 MG/DL (ref 70–110)
POCT GLUCOSE: 192 MG/DL (ref 70–110)
POCT GLUCOSE: 210 MG/DL (ref 70–110)
POTASSIUM SERPL-SCNC: 2 MMOL/L (ref 3.5–5.1)
POTASSIUM SERPL-SCNC: 2.1 MMOL/L (ref 3.5–5.1)
POTASSIUM SERPL-SCNC: 2.5 MMOL/L (ref 3.5–5.1)
PROT SERPL-MCNC: 6.1 G/DL (ref 6–8.4)
PROT SERPL-MCNC: 7.3 G/DL (ref 6–8.4)
SODIUM SERPL-SCNC: 126 MMOL/L (ref 136–145)
SODIUM SERPL-SCNC: 127 MMOL/L (ref 136–145)
SODIUM SERPL-SCNC: 127 MMOL/L (ref 136–145)
SODIUM UR-SCNC: 14 MMOL/L (ref 20–250)

## 2022-01-11 PROCEDURE — 25000003 PHARM REV CODE 250: Performed by: STUDENT IN AN ORGANIZED HEALTH CARE EDUCATION/TRAINING PROGRAM

## 2022-01-11 PROCEDURE — 82570 ASSAY OF URINE CREATININE: CPT | Performed by: STUDENT IN AN ORGANIZED HEALTH CARE EDUCATION/TRAINING PROGRAM

## 2022-01-11 PROCEDURE — 80048 BASIC METABOLIC PNL TOTAL CA: CPT | Performed by: STUDENT IN AN ORGANIZED HEALTH CARE EDUCATION/TRAINING PROGRAM

## 2022-01-11 PROCEDURE — 63600175 PHARM REV CODE 636 W HCPCS: Performed by: STUDENT IN AN ORGANIZED HEALTH CARE EDUCATION/TRAINING PROGRAM

## 2022-01-11 PROCEDURE — 83735 ASSAY OF MAGNESIUM: CPT | Performed by: STUDENT IN AN ORGANIZED HEALTH CARE EDUCATION/TRAINING PROGRAM

## 2022-01-11 PROCEDURE — 12000002 HC ACUTE/MED SURGE SEMI-PRIVATE ROOM

## 2022-01-11 PROCEDURE — 84300 ASSAY OF URINE SODIUM: CPT | Performed by: STUDENT IN AN ORGANIZED HEALTH CARE EDUCATION/TRAINING PROGRAM

## 2022-01-11 PROCEDURE — 80053 COMPREHEN METABOLIC PANEL: CPT | Performed by: STUDENT IN AN ORGANIZED HEALTH CARE EDUCATION/TRAINING PROGRAM

## 2022-01-11 PROCEDURE — C9399 UNCLASSIFIED DRUGS OR BIOLOG: HCPCS | Performed by: STUDENT IN AN ORGANIZED HEALTH CARE EDUCATION/TRAINING PROGRAM

## 2022-01-11 RX ORDER — POTASSIUM CHLORIDE 20 MEQ/1
40 TABLET, EXTENDED RELEASE ORAL ONCE
Status: COMPLETED | OUTPATIENT
Start: 2022-01-11 | End: 2022-01-11

## 2022-01-11 RX ORDER — SODIUM CHLORIDE 0.9 % (FLUSH) 0.9 %
10 SYRINGE (ML) INJECTION EVERY 12 HOURS PRN
Status: DISCONTINUED | OUTPATIENT
Start: 2022-01-11 | End: 2022-01-13 | Stop reason: HOSPADM

## 2022-01-11 RX ORDER — IBUPROFEN 200 MG
24 TABLET ORAL
Status: DISCONTINUED | OUTPATIENT
Start: 2022-01-11 | End: 2022-01-13 | Stop reason: HOSPADM

## 2022-01-11 RX ORDER — POTASSIUM CHLORIDE 20 MEQ/1
20 TABLET, EXTENDED RELEASE ORAL
Status: COMPLETED | OUTPATIENT
Start: 2022-01-11 | End: 2022-01-11

## 2022-01-11 RX ORDER — SODIUM CHLORIDE 0.9 % (FLUSH) 0.9 %
10 SYRINGE (ML) INJECTION
Status: DISCONTINUED | OUTPATIENT
Start: 2022-01-11 | End: 2022-01-13 | Stop reason: HOSPADM

## 2022-01-11 RX ORDER — IBUPROFEN 200 MG
16 TABLET ORAL
Status: DISCONTINUED | OUTPATIENT
Start: 2022-01-11 | End: 2022-01-13 | Stop reason: HOSPADM

## 2022-01-11 RX ORDER — GABAPENTIN 300 MG/1
300 CAPSULE ORAL 3 TIMES DAILY
Status: DISCONTINUED | OUTPATIENT
Start: 2022-01-11 | End: 2022-01-13 | Stop reason: HOSPADM

## 2022-01-11 RX ORDER — THIAMINE HCL 100 MG
100 TABLET ORAL DAILY
Status: DISCONTINUED | OUTPATIENT
Start: 2022-01-11 | End: 2022-01-13 | Stop reason: HOSPADM

## 2022-01-11 RX ORDER — DULOXETIN HYDROCHLORIDE 60 MG/1
60 CAPSULE, DELAYED RELEASE ORAL DAILY
Status: DISCONTINUED | OUTPATIENT
Start: 2022-01-11 | End: 2022-01-13 | Stop reason: HOSPADM

## 2022-01-11 RX ORDER — GLUCAGON 1 MG
1 KIT INJECTION
Status: DISCONTINUED | OUTPATIENT
Start: 2022-01-11 | End: 2022-01-13 | Stop reason: HOSPADM

## 2022-01-11 RX ORDER — POTASSIUM CHLORIDE 7.45 MG/ML
10 INJECTION INTRAVENOUS
Status: COMPLETED | OUTPATIENT
Start: 2022-01-11 | End: 2022-01-11

## 2022-01-11 RX ORDER — FOLIC ACID 1 MG/1
1 TABLET ORAL DAILY
Status: DISCONTINUED | OUTPATIENT
Start: 2022-01-11 | End: 2022-01-13 | Stop reason: HOSPADM

## 2022-01-11 RX ORDER — INSULIN ASPART 100 [IU]/ML
1-10 INJECTION, SOLUTION INTRAVENOUS; SUBCUTANEOUS
Status: DISCONTINUED | OUTPATIENT
Start: 2022-01-11 | End: 2022-01-13 | Stop reason: HOSPADM

## 2022-01-11 RX ORDER — ENOXAPARIN SODIUM 100 MG/ML
40 INJECTION SUBCUTANEOUS EVERY 24 HOURS
Status: DISCONTINUED | OUTPATIENT
Start: 2022-01-11 | End: 2022-01-13 | Stop reason: HOSPADM

## 2022-01-11 RX ORDER — SPIRONOLACTONE 100 MG/1
200 TABLET, FILM COATED ORAL DAILY
Status: DISCONTINUED | OUTPATIENT
Start: 2022-01-11 | End: 2022-01-13 | Stop reason: HOSPADM

## 2022-01-11 RX ORDER — CYANOCOBALAMIN (VITAMIN B-12) 250 MCG
250 TABLET ORAL DAILY
Status: DISCONTINUED | OUTPATIENT
Start: 2022-01-11 | End: 2022-01-13 | Stop reason: HOSPADM

## 2022-01-11 RX ADMIN — Medication 100 MG: at 08:01

## 2022-01-11 RX ADMIN — GABAPENTIN 300 MG: 300 CAPSULE ORAL at 09:01

## 2022-01-11 RX ADMIN — POTASSIUM CHLORIDE 20 MEQ: 1500 TABLET, EXTENDED RELEASE ORAL at 05:01

## 2022-01-11 RX ADMIN — POTASSIUM CHLORIDE 10 MEQ: 7.46 INJECTION, SOLUTION INTRAVENOUS at 03:01

## 2022-01-11 RX ADMIN — ENOXAPARIN SODIUM 40 MG: 40 INJECTION SUBCUTANEOUS at 05:01

## 2022-01-11 RX ADMIN — DULOXETINE 60 MG: 60 CAPSULE, DELAYED RELEASE ORAL at 08:01

## 2022-01-11 RX ADMIN — SPIRONOLACTONE 200 MG: 100 TABLET ORAL at 09:01

## 2022-01-11 RX ADMIN — INSULIN DETEMIR 20 UNITS: 100 INJECTION, SOLUTION SUBCUTANEOUS at 09:01

## 2022-01-11 RX ADMIN — POTASSIUM CHLORIDE 10 MEQ: 7.46 INJECTION, SOLUTION INTRAVENOUS at 05:01

## 2022-01-11 RX ADMIN — POTASSIUM CHLORIDE 40 MEQ: 1500 TABLET, EXTENDED RELEASE ORAL at 03:01

## 2022-01-11 RX ADMIN — POTASSIUM CHLORIDE 20 MEQ: 1500 TABLET, EXTENDED RELEASE ORAL at 03:01

## 2022-01-11 RX ADMIN — INSULIN DETEMIR 20 UNITS: 100 INJECTION, SOLUTION SUBCUTANEOUS at 01:01

## 2022-01-11 RX ADMIN — POTASSIUM CHLORIDE 10 MEQ: 7.46 INJECTION, SOLUTION INTRAVENOUS at 06:01

## 2022-01-11 RX ADMIN — FOLIC ACID 1 MG: 1 TABLET ORAL at 08:01

## 2022-01-11 RX ADMIN — GABAPENTIN 300 MG: 300 CAPSULE ORAL at 08:01

## 2022-01-11 RX ADMIN — CYANOCOBALAMIN TAB 250 MCG 250 MCG: 250 TAB at 02:01

## 2022-01-11 RX ADMIN — SODIUM CHLORIDE 1000 ML: 0.9 INJECTION, SOLUTION INTRAVENOUS at 03:01

## 2022-01-11 RX ADMIN — POTASSIUM CHLORIDE 20 MEQ: 1500 TABLET, EXTENDED RELEASE ORAL at 01:01

## 2022-01-11 RX ADMIN — POTASSIUM CHLORIDE 10 MEQ: 7.46 INJECTION, SOLUTION INTRAVENOUS at 07:01

## 2022-01-11 RX ADMIN — GABAPENTIN 300 MG: 300 CAPSULE ORAL at 02:01

## 2022-01-11 NOTE — H&P
"[facility]  History and Physical      Patient Name: Brunilda Delgadillo  MRN: [id number]  Date of Admission: 1/10/2022     Principal Problem: Hypokalemia     Chief Complaint     Hypokalemia [Sent by PCP for abnormal labs]    History of Present Illness    Patient is a 56 y.o. F with history of liver cirrhosis, T2DM, and depression, presents to ED by request of PCP for hypokalemia and hyponatremia. Patient reports that she had her lab check 3 days ago, found to have hypokalemia. Her PCP recommended her to hold Lasix, they treated her with 40 meq of Kdur. They rechecked her labs today, found to have persistent hypokelemia, so they recommended her to come to the ED for further evaluation. Patient reports feeling "fine" generally. Denies fever, chills, chest pain, palpation, dizziness, or urinary symptoms. Reports compliance with her medication and insulin regimen. Patient is not on transplant list at this point.      Review of Systems    Constitutional: Negative for chills, fatigue, fever.   HENT: Negative for sore throat, trouble swallowing.    Eyes: Negative for photophobia, visual disturbance.   Respiratory: Negative for cough, shortness of breath.    Cardiovascular: Negative for chest pain, palpitations, leg swelling.   Gastrointestinal: Negative for abdominal pain, constipation, diarrhea, nausea, vomiting.   Endocrine: Negative for cold intolerance, heat intolerance.   Genitourinary: Negative for dysuria, frequency.   Musculoskeletal: Negative for arthralgias, myalgias.   Skin: Negative for rash, wound, erythema   Neurological: Negative for dizziness, syncope, weakness, light-headedness.   Psychiatric/Behavioral: Negative for confusion, hallucinations, anxiety    Past Medical History:   Diagnosis Date    Anxiety disorder 8/21/2019    Ascites 8/21/2019    Cirrhosis     Depression 8/21/2019    Diabetes mellitus     History of cellulitis 8/21/2019    Hyponatremia 8/21/2019    Portal hypertensive gastropathy " 2019    On EGD     Type 2 diabetes mellitus 2019     Past Surgical History:   Procedure Laterality Date     SECTION  ,     COLONOSCOPY      ESOPHAGOGASTRODUODENOSCOPY N/A 2020    Procedure: EGD (ESOPHAGOGASTRODUODENOSCOPY);  Surgeon: Ayden Bernal MD;  [facility]  Service: Endoscopy;  Laterality: N/A;  labs prior    EYE SURGERY      cosmetic    HERNIA REPAIR      umbilical    PERITONEOCENTESIS N/A 10/21/2019    Procedure: PARACENTESIS, ABDOMINAL;  Surgeon: Israel Hidalgo MD;  Location: [facility] CATH LAB;  Service: Radiology;  Laterality: N/A;    TONSILLECTOMY  1972    TUBAL LIGATION      tummy tuck       Family History   Problem Relation Age of Onset    Glaucoma Mother     Hypertension Mother     Heart disease Father     Prostate cancer Father      Social History     Socioeconomic History    Marital status:    Occupational History    Occupation: homemaker   Tobacco Use    Smoking status: Never Smoker    Smokeless tobacco: Never Used   Substance and Sexual Activity    Alcohol use: Not Currently    Drug use: Not Currently     Medications  Scheduled Meds:   cyanocobalamin  100 mcg Oral Daily    DULoxetine  60 mg Oral Daily    folic acid  1 mg Oral Daily    gabapentin  300 mg Oral TID    insulin detemir U-100  20 Units Subcutaneous QHS    potassium chloride  20 mEq Oral Q2H    spironolactone  200 mg Oral Daily    thiamine  100 mg Oral Daily     Continuous Infusions:  PRN Meds:.dextrose 50%, dextrose 50%, glucagon (human recombinant), glucose, glucose, insulin aspart U-100, sodium chloride 0.9%, sodium chloride 0.9%    Allergies  Penicillins    Physical Examination    Temp:  [99.1 °F (37.3 °C)]   Pulse:  [69-91]   Resp:  [18-19]   BP: (112-139)/(66-69)   SpO2:  [98 %-100 %]     Gen: NAD, conversant  Head: NC, AT  Eyes: PERRLA, EOMI  Throat: MMM, OP clear  CV: RRR, no peripheral edema  Resp: CTAB, no increased work of  breathing on room air  GI: Soft, NT, ND, +BS  Ext: MAEW, no c/c/e  Neuro: Alert, oriented, CN grossly intact, no focal weakness/numbness  Psychiatry: Normal mood, normal affect    CBC  Recent Labs   Lab 01/07/22  1135 01/10/22  1156 01/10/22  2304   WBC 7.77 8.24 7.92   HGB 15.1 15.3 14.1   HCT 42.7 43.3 39.0    186 163     CMP  Recent Labs   Lab 01/06/22  1057 01/06/22  1057 01/07/22  1135 01/10/22  1156 01/10/22  2304   *   < > 127* 128* 126*   K 2.2*   < > 2.7* 2.2* 2.0*   CL <70*   < > 75* 76* 80*   CO2 40*   < > 40* 43* 35*   BUN 22*   < > 14 20 16   CREATININE 1.1   < > 0.9 1.0 0.8   *   < > 379* 279* 242*   CALCIUM 10.1   < > 10.7* 10.1 9.4   MG  --   --   --   --  1.9   PHOS  --   --   --   --  1.8*   ALKPHOS 194*   < > 176* 170* 176*   ALT 25   < > 26 26 26   AST 26   < > 28 26 26   ALBUMIN 4.1   < > 3.8 4.0 3.8   PROT 7.9   < > 7.5 7.7 7.3   BILITOT 3.2*   < > 2.5* 2.2* 2.1*   INR 1.1  --  1.1 1.0  --     < > = values in this interval not displayed.     EKG  Initial Reading: No STEMI. Rhythm: Normal Sinus Rhythm. Heart Rate: 84. Ectopy: No Ectopy. Clinical Impression: Normal Sinus Rhythm   qtc 609     Assessment and Plan:    Hypokalemia  - Patient with low potassium that did not respond to attempt to replete outpatient  - 2.0 on check this evening  Patient is asymptomatic  - Received potassium 10mEq orally and 10mEq IV per ED  - Will plan to give three more doses of 20mEq potassium chloride tablets  - Continue to trend K and replete as warranted    Hyponatremia  - Asymptomatic  - 126 on admit  Likely hypovolemic hyponatremia from diuresis  - She received 1L IVF bolus  - Continue to trend    DM2  - Continue half dose of patient's basal regimen and moderate SSI  - POCT Glucose AC and HS    Depression  - Mood normal  - Continue cymbalta 60mg    Liver Cirrhosis  - Continue spironolactone 200mg daily        Diet: Diabetic, low sodium  VTE PPX: Enoxaparin 40mg q24h  Goals of care: Full Code       Jin Willson MD  Department of Hospital Medicine  [facility]

## 2022-01-11 NOTE — ED PROVIDER NOTES
"Encounter Date: 1/10/2022       History     Chief Complaint   Patient presents with    Abnormal Lab     Pt was told to come in by PCP for K 2.2 and . She c/o fatigue. Denies any pain.      56 y.o. F with history of liver cirrhosis, T2DM, and depression, presents to ED by PCP for hypokalemia and hyponatremia. Patient reports that she had her labs check 3 days ago, found to have hypokalemia. Her PCP recommended her to hold Lasix, they treated her with 40 meq of Kdur. They rechecked her labs today, found to have persistent hypokelemia, so she was referred to the ED for further evaluation. Patient reports feeling "fine" generally. Denies fever, chill, chest pain, palpation, dizziness, or urinary symptoms. Reports compliance with her medication and insulin regimen. Patient is not on transplant list at this point.     The history is provided by the patient and medical records. No  was used.     Review of patient's allergies indicates:   Allergen Reactions    Penicillins      Rash as a child     Past Medical History:   Diagnosis Date    Anxiety disorder 2019    Ascites 2019    Cirrhosis     Depression 2019    Diabetes mellitus     History of cellulitis 2019    Hyponatremia 2019    Portal hypertensive gastropathy 2019    On EGD     Type 2 diabetes mellitus 2019     Past Surgical History:   Procedure Laterality Date     SECTION  ,     COLONOSCOPY      ESOPHAGOGASTRODUODENOSCOPY N/A 2020    Procedure: EGD (ESOPHAGOGASTRODUODENOSCOPY);  Surgeon: Ayden Bernal MD;  Location: [address]);  Service: Endoscopy;  Laterality: N/A;  labs prior    EYE SURGERY      cosmetic    HERNIA REPAIR      umbilical    PERITONEOCENTESIS N/A 10/21/2019    Procedure: PARACENTESIS, ABDOMINAL;  Surgeon: Israel Hidalgo MD;  Location: [facility] CATH LAB;  Service: Radiology;  Laterality: N/A;    TONSILLECTOMY  1972    TUBAL LIGATION   "    tummy tuck       Family History   Problem Relation Age of Onset    Glaucoma Mother     Hypertension Mother     Heart disease Father     Prostate cancer Father      Social History     Tobacco Use    Smoking status: Never Smoker    Smokeless tobacco: Never Used   Substance Use Topics    Alcohol use: Not Currently    Drug use: Not Currently     Review of Systems   Constitutional: Negative for activity change, fatigue and fever.   HENT: Negative for congestion and sore throat.    Eyes: Negative for photophobia and visual disturbance.   Respiratory: Negative for chest tightness and shortness of breath.    Cardiovascular: Negative for chest pain and palpitations.   Gastrointestinal: Negative for abdominal distention, abdominal pain, nausea and vomiting.   Genitourinary: Negative for decreased urine volume, dysuria and flank pain.   Musculoskeletal: Negative for back pain and neck pain.   Skin: Negative for rash.   Neurological: Negative for dizziness, syncope and headaches.       Physical Exam     Initial Vitals [01/10/22 2044]   BP Pulse Resp Temp SpO2   118/68 91 18 99.1 °F (37.3 °C) 99 %      MAP       --         Physical Exam    Nursing note and vitals reviewed.  Constitutional: She appears well-developed. No distress.   HENT:   Head: Normocephalic and atraumatic.   Mouth/Throat: Oropharynx is clear and moist.   Eyes: Conjunctivae and EOM are normal.   Neck: No JVD present.   Normal range of motion.  Cardiovascular: Normal rate and regular rhythm.   Pulmonary/Chest: Breath sounds normal. No respiratory distress.   Abdominal: Abdomen is soft. She exhibits no distension.   Musculoskeletal:         General: No tenderness or edema. Normal range of motion.      Cervical back: Normal range of motion.     Neurological: She is alert and oriented to person, place, and time. No cranial nerve deficit.   Skin: Skin is warm and dry. Capillary refill takes less than 2 seconds.   Psychiatric: She has a normal mood and  affect. Thought content normal.         ED Course   Procedures  Labs Reviewed   CBC W/ AUTO DIFFERENTIAL - Abnormal; Notable for the following components:       Result Value    MCH 34.5 (*)     MCHC 36.2 (*)     Immature Granulocytes 0.8 (*)     Immature Grans (Abs) 0.06 (*)     All other components within normal limits   COMPREHENSIVE METABOLIC PANEL - Abnormal; Notable for the following components:    Sodium 126 (*)     Potassium 2.0 (*)     Chloride 80 (*)     CO2 35 (*)     Glucose 242 (*)     Total Bilirubin 2.1 (*)     Alkaline Phosphatase 176 (*)     All other components within normal limits   PHOSPHORUS - Abnormal; Notable for the following components:    Phosphorus 1.8 (*)     All other components within normal limits   SODIUM, URINE, RANDOM - Abnormal; Notable for the following components:    Sodium, Urine 14 (*)     All other components within normal limits    Narrative:     Specimen Source->Urine   COMPREHENSIVE METABOLIC PANEL - Abnormal; Notable for the following components:    Sodium 127 (*)     Potassium 2.1 (*)     Chloride 84 (*)     CO2 32 (*)     Glucose 178 (*)     Calcium 8.4 (*)     Albumin 3.2 (*)     Total Bilirubin 1.7 (*)     Alkaline Phosphatase 144 (*)     All other components within normal limits   BASIC METABOLIC PANEL - Abnormal; Notable for the following components:    Sodium 127 (*)     Potassium 2.5 (*)     Chloride 87 (*)     CO2 33 (*)     Glucose 197 (*)     Calcium 8.6 (*)     Anion Gap 7 (*)     All other components within normal limits   POCT GLUCOSE - Abnormal; Notable for the following components:    POCT Glucose 192 (*)     All other components within normal limits   POCT GLUCOSE - Abnormal; Notable for the following components:    POCT Glucose 160 (*)     All other components within normal limits   MAGNESIUM   CREATININE, URINE, RANDOM    Narrative:     Specimen Source->Urine   MAGNESIUM   SARS-COV-2 RDRP GENE    Narrative:     This test utilizes isothermal nucleic acid  amplification   technology to detect the SARS-CoV-2 RdRp nucleic acid segment.   The analytical sensitivity (limit of detection) is 125 genome   equivalents/mL.   A POSITIVE result implies infection with the SARS-CoV-2 virus;   the patient is presumed to be contagious.     A NEGATIVE result means that SARS-CoV-2 nucleic acids are not   present above the limit of detection. A NEGATIVE result should be   treated as presumptive. It does not rule out the possibility of   COVID-19 and should not be the sole basis for treatment decisions.   If COVID-19 is strongly suspected based on clinical and exposure   history, re-testing using an alternate molecular assay should be   considered.   This test is only for use under the Food and Drug   Administration s Emergency Use Authorization (EUA).   Commercial kits are provided by [employer].   Performance characteristics of the EUA have been independently   verified by [facility] Department of   Pathology and Laboratory Medicine.   _________________________________________________________________   The authorized Fact Sheet for Healthcare Providers and the authorized Fact   Sheet for Patients of the ID NOW COVID-19 are available on the FDA   website:     https://www.fda.gov/media/158872/download  https://www.fda.gov/media/380178/download         POCT GLUCOSE, HAND-HELD DEVICE   POCT GLUCOSE, HAND-HELD DEVICE     EKG Readings: (Independently Interpreted)   Initial Reading: No STEMI. Rhythm: Normal Sinus Rhythm. Heart Rate: 84. Ectopy: No Ectopy. Clinical Impression: Normal Sinus Rhythm   qtc 609     ECG Results          EKG 12-lead (Final result)  Result time 01/11/22 11:10:01    Final result by Interface, Lab In [facility] (01/11/22 11:10:01)                 Narrative:    Test Reason : E87.6,    Vent. Rate : 084 BPM     Atrial Rate : 084 BPM     P-R Int : 154 ms          QRS Dur : 078 ms      QT Int : 516 ms       P-R-T Axes : 072 -33 033 degrees     QTc Int : 609  ms    Normal sinus rhythm  Left axis deviation  Low voltage QRS in the precordial leads  Abnormal R wave progression in the precordial leads  Prolonged QT  Abnormal ECG  When compared with ECG of 10-JENNIFER-2022 20:49,  No significant change was found  Confirmed by Deepak Mayers MD ([id number]) on 1/11/2022 11:09:56 AM    Referred By: RANJIT   SELF           Confirmed By:Deepak Mayers MD                             EKG 12-lead (Final result)  Result time 01/11/22 10:07:26    Final result by Interface, Lab In [facility] (01/11/22 10:07:26)                 Narrative:    Test Reason : E87.6,    Vent. Rate : 087 BPM     Atrial Rate : 087 BPM     P-R Int : 154 ms          QRS Dur : 082 ms      QT Int : 504 ms       P-R-T Axes : 069 -38 025 degrees     QTc Int : 606 ms    Normal sinus rhythm  Left axis deviation  Nonspecific ST abnormality  Abnormal ECG  When compared with ECG of 13-DEC-2019 08:31,  Significant changes have occurred  Confirmed by Deepak Mayers MD ([id number]) on 1/11/2022 10:07:19 AM    Referred By: RANJIT   SELF           Confirmed By:Deepak Mayers MD                            Imaging Results    None          Medications   sodium chloride 0.9% flush 10 mL (has no administration in time range)   cyanocobalamin tablet 250 mcg (has no administration in time range)   DULoxetine DR capsule 60 mg (60 mg Oral Given 1/11/22 0858)   folic acid tablet 1 mg (1 mg Oral Given 1/11/22 0858)   gabapentin capsule 300 mg (300 mg Oral Given 1/11/22 0856)   insulin detemir U-100 pen 20 Units (20 Units Subcutaneous Given 1/11/22 0137)   spironolactone tablet 200 mg (200 mg Oral Given 1/11/22 0931)   thiamine tablet 100 mg (100 mg Oral Given 1/11/22 0859)   sodium chloride 0.9% flush 10 mL (has no administration in time range)   glucose chewable tablet 16 g (has no administration in time range)   glucose chewable tablet 24 g (has no administration in time range)   dextrose 50% injection 12.5 g (has no administration in time range)    dextrose 50% injection 25 g (has no administration in time range)   glucagon (human recombinant) injection 1 mg (has no administration in time range)   insulin aspart U-100 pen 1-10 Units (has no administration in time range)   enoxaparin injection 40 mg (has no administration in time range)   potassium chloride 10 mEq in 100 mL IVPB (0 mEq Intravenous Stopped 1/11/22 0043)   potassium chloride CR capsule 10 mEq (10 mEq Oral Given 1/10/22 2344)   magnesium sulfate 2g in water 50mL IVPB (premix) (0 g Intravenous Stopped 1/10/22 2337)   sodium chloride 0.9% bolus 1,000 mL (0 mLs Intravenous Stopped 1/11/22 0011)   potassium chloride SA CR tablet 20 mEq (20 mEq Oral Given 1/11/22 0540)     Medical Decision Making:   History:   Old Medical Records: I decided to obtain old medical records.  Old Records Summarized: other records.       <> Summary of Records: Patient is found to have hypokalemia last Wednesday, she was told to hold her Lasix and prescribed oral potassium replacement.  Patient has persistent hypokalemia despite outpatient treatment.  She also found to have hyponatremia.  Initial Assessment:   56-year-old with history of liver cirrhosis, type 2 diabetes, depression, presents to the ED per PCP recommendation for hypokalemia.  Patient is alert and oriented, afebrile, no acute distress.  Reassuring physical exam, patient is otherwise asymptomatic.  Vital signs reviewed, stable  Differential Diagnosis:   Including, but not limited to: Lasix side effects, dehydration, renal function deficiency. doubt ascites or fluid retention.   Independently Interpreted Test(s):   I have ordered and independently interpreted EKG Reading(s) - see prior notes  Clinical Tests:   Lab Tests: Ordered and Reviewed  Medical Tests: Ordered and Reviewed  ED Management:  Her EKG indicated prolonged QT interval, orderred magnesium and 1000 cc bolus.  Her hypokalemia is replaced with IV and oral potassium.  Her hyponatremia is likely due  to fluid loss, patient is neurologically intact, no indication for hypertonic saline.  Discussed with Hospital Medicine, agreed to admit  for inpatient management for her electrolyte derangement.  Patient agreed with disposition, no other concerns  Other:   I have discussed this case with another health care provider.            Attending Attestation:   Physician Attestation Statement for Resident:  As the supervising MD   Physician Attestation Statement: I have personally seen and examined this patient.   I agree with the above history. -: 56-year-old female referred for abnormal outpatient labs.  Has been compliant with Lasix, stating she takes it at night and often wakes up to urinate 4-5 times each night.   As the supervising MD I agree with the above PE.   -: Afebrile, well-appearing  No peripheral edema  Lungs clear  Fully oriented, nonfocal neurologic exam   As the supervising MD I agree with the above treatment, course, plan, and disposition.   -:     Agree with magnesium administration for prolonged QT on EKG.  Potassium repleted.    Hyponatremia likely hypovolemic due to over-diuresis, received IV fluids.  No indication for hypertonic saline.    Agree with admission to trend electrolytes, continue cardiac monitoring.  I have reviewed and agree with the residents interpretation of the following: lab data and EKG.  I have reviewed the following: old records at this facility.                ED Course as of 01/11/22 1329   Tue Jan 11, 2022   0037 WBC: 7.92 [NC]   0037 Hemoglobin: 14.1 [NC]   0037 Platelets: 163 [NC]   0037 Sodium(!): 126  Hyponatremia [NC]   0037 Potassium(!!): 2.0  Hypokalemia [NC]   0037 Glucose(!): 242 [NC]   0053 Glucose(!): 242  Hyperglycemia w/o DKA  [AB]   0053 Potassium(!!): 2.0  Hypokalemia  [AB]   0053 Sodium(!): 126  Hyponatremia  [AB]   0053 Magnesium: 1.9 [AB]      ED Course User Index  [AB] Abiel Gauthier MD  [NC] Ted Beverly MD             Clinical Impression:   Final  diagnoses:  [E87.6] Hypokalemia  [E87.1] Hyponatremia (Primary)  [R94.31] Prolonged Q-T interval on ECG          ED Disposition Condition    Admit               Ted Beverly MD  Resident  01/11/22 9558       Abiel Gauthier MD  01/11/22 8930

## 2022-01-11 NOTE — ED TRIAGE NOTES
Pt told by PCP to come to ED for K of 2.2 and . Pt reports feeling fatigued. Denies chest pain and SOB.

## 2022-01-12 LAB
ALBUMIN SERPL BCP-MCNC: 3.2 G/DL (ref 3.5–5.2)
ALP SERPL-CCNC: 140 U/L (ref 55–135)
ALT SERPL W/O P-5'-P-CCNC: 21 U/L (ref 10–44)
ANION GAP SERPL CALC-SCNC: 10 MMOL/L (ref 8–16)
ANION GAP SERPL CALC-SCNC: 6 MMOL/L (ref 8–16)
AST SERPL-CCNC: 24 U/L (ref 10–40)
BILIRUB SERPL-MCNC: 1.7 MG/DL (ref 0.1–1)
BUN SERPL-MCNC: 10 MG/DL (ref 6–20)
BUN SERPL-MCNC: 8 MG/DL (ref 6–20)
CALCIUM SERPL-MCNC: 8.8 MG/DL (ref 8.7–10.5)
CALCIUM SERPL-MCNC: 9 MG/DL (ref 8.7–10.5)
CHLORIDE SERPL-SCNC: 98 MMOL/L (ref 95–110)
CHLORIDE SERPL-SCNC: 99 MMOL/L (ref 95–110)
CO2 SERPL-SCNC: 23 MMOL/L (ref 23–29)
CO2 SERPL-SCNC: 30 MMOL/L (ref 23–29)
CREAT SERPL-MCNC: 0.7 MG/DL (ref 0.5–1.4)
CREAT SERPL-MCNC: 0.8 MG/DL (ref 0.5–1.4)
EST. GFR  (AFRICAN AMERICAN): >60 ML/MIN/1.73 M^2
EST. GFR  (AFRICAN AMERICAN): >60 ML/MIN/1.73 M^2
EST. GFR  (NON AFRICAN AMERICAN): >60 ML/MIN/1.73 M^2
EST. GFR  (NON AFRICAN AMERICAN): >60 ML/MIN/1.73 M^2
GLUCOSE SERPL-MCNC: 115 MG/DL (ref 70–110)
GLUCOSE SERPL-MCNC: 203 MG/DL (ref 70–110)
MAGNESIUM SERPL-MCNC: 2.2 MG/DL (ref 1.6–2.6)
PETH 16:0/18.1 (POPETH): 392 NG/ML
PETH 16:0/18.2 (PLPETH): 317 NG/ML
POCT GLUCOSE: 242 MG/DL (ref 70–110)
POCT GLUCOSE: 278 MG/DL (ref 70–110)
POTASSIUM SERPL-SCNC: 4 MMOL/L (ref 3.5–5.1)
POTASSIUM SERPL-SCNC: 4.1 MMOL/L (ref 3.5–5.1)
PROT SERPL-MCNC: 6.2 G/DL (ref 6–8.4)
SODIUM SERPL-SCNC: 131 MMOL/L (ref 136–145)
SODIUM SERPL-SCNC: 135 MMOL/L (ref 136–145)

## 2022-01-12 PROCEDURE — 99232 PR SUBSEQUENT HOSPITAL CARE,LEVL II: ICD-10-PCS | Mod: ,,, | Performed by: STUDENT IN AN ORGANIZED HEALTH CARE EDUCATION/TRAINING PROGRAM

## 2022-01-12 PROCEDURE — 20600001 HC STEP DOWN PRIVATE ROOM

## 2022-01-12 PROCEDURE — 80053 COMPREHEN METABOLIC PANEL: CPT | Performed by: STUDENT IN AN ORGANIZED HEALTH CARE EDUCATION/TRAINING PROGRAM

## 2022-01-12 PROCEDURE — 99232 SBSQ HOSP IP/OBS MODERATE 35: CPT | Mod: ,,, | Performed by: STUDENT IN AN ORGANIZED HEALTH CARE EDUCATION/TRAINING PROGRAM

## 2022-01-12 PROCEDURE — 36415 COLL VENOUS BLD VENIPUNCTURE: CPT | Performed by: STUDENT IN AN ORGANIZED HEALTH CARE EDUCATION/TRAINING PROGRAM

## 2022-01-12 PROCEDURE — 25000003 PHARM REV CODE 250: Performed by: STUDENT IN AN ORGANIZED HEALTH CARE EDUCATION/TRAINING PROGRAM

## 2022-01-12 PROCEDURE — 80048 BASIC METABOLIC PNL TOTAL CA: CPT | Performed by: STUDENT IN AN ORGANIZED HEALTH CARE EDUCATION/TRAINING PROGRAM

## 2022-01-12 PROCEDURE — 63600175 PHARM REV CODE 636 W HCPCS: Performed by: STUDENT IN AN ORGANIZED HEALTH CARE EDUCATION/TRAINING PROGRAM

## 2022-01-12 PROCEDURE — 83735 ASSAY OF MAGNESIUM: CPT | Performed by: STUDENT IN AN ORGANIZED HEALTH CARE EDUCATION/TRAINING PROGRAM

## 2022-01-12 RX ORDER — GABAPENTIN 300 MG/1
600 CAPSULE ORAL NIGHTLY
Status: DISCONTINUED | OUTPATIENT
Start: 2022-01-12 | End: 2022-01-13 | Stop reason: HOSPADM

## 2022-01-12 RX ADMIN — Medication 100 MG: at 09:01

## 2022-01-12 RX ADMIN — INSULIN ASPART 4 UNITS: 100 INJECTION, SOLUTION INTRAVENOUS; SUBCUTANEOUS at 12:01

## 2022-01-12 RX ADMIN — FOLIC ACID 1 MG: 1 TABLET ORAL at 09:01

## 2022-01-12 RX ADMIN — SPIRONOLACTONE 200 MG: 100 TABLET ORAL at 09:01

## 2022-01-12 RX ADMIN — CYANOCOBALAMIN TAB 250 MCG 250 MCG: 250 TAB at 09:01

## 2022-01-12 RX ADMIN — GABAPENTIN 600 MG: 300 CAPSULE ORAL at 10:01

## 2022-01-12 RX ADMIN — INSULIN DETEMIR 20 UNITS: 100 INJECTION, SOLUTION SUBCUTANEOUS at 10:01

## 2022-01-12 RX ADMIN — DULOXETINE 60 MG: 60 CAPSULE, DELAYED RELEASE ORAL at 09:01

## 2022-01-12 RX ADMIN — SODIUM CHLORIDE 1000 ML: 0.9 INJECTION, SOLUTION INTRAVENOUS at 06:01

## 2022-01-12 RX ADMIN — ENOXAPARIN SODIUM 40 MG: 40 INJECTION SUBCUTANEOUS at 05:01

## 2022-01-12 RX ADMIN — GABAPENTIN 300 MG: 300 CAPSULE ORAL at 10:01

## 2022-01-12 RX ADMIN — GABAPENTIN 300 MG: 300 CAPSULE ORAL at 03:01

## 2022-01-12 RX ADMIN — GABAPENTIN 300 MG: 300 CAPSULE ORAL at 09:01

## 2022-01-12 RX ADMIN — INSULIN ASPART 3 UNITS: 100 INJECTION, SOLUTION INTRAVENOUS; SUBCUTANEOUS at 10:01

## 2022-01-12 NOTE — PLAN OF CARE
POC reviewed w/ pt, verbalized understanding. Pt AAOx4. VSS on RA. Pt on tele, NSR Patient denies pain overnight. Pt ambulates to bathroom independently. Pt tolerating diabetic diet with no c/o nausea. Pt slept between care. Frequent rounds made for pt safety. Call light in reach and bed in lowest position. Will continue to monitor POC.

## 2022-01-12 NOTE — PLAN OF CARE
See full H&P for details. Patient admitted for hypokalemia and hyponatremia. Na 127 and K 2.5 on repeat testing this morning.    Plan:  - Give  40 meq  PO and 40 meq IV   - 1L NS  - Repeat labs in the morning

## 2022-01-12 NOTE — PLAN OF CARE
[facility]  Initial Discharge Assessment       Primary Care Provider: Brett Michele MD    Admission Diagnosis: Hypokalemia [E87.6]  Hyponatremia [E87.1]  Prolonged Q-T interval on ECG [R94.31]  Chest pain [R07.9]    Admission Date: 1/10/2022  Expected Discharge Date: 1/13/2022    Discharge Barriers Identified: None    Payor: [employer] / Plan: [place] CHO[employer] / Product Type: Commercial /     Extended Emergency Contact Information  Primary Emergency Contact: Enrique Delgadillo  Mobile Phone: [phone]  Relation: Spouse  Preferred language: English   needed? No    Discharge Plan A: Home  Discharge Plan B: Home with family      [facility] [address] [place] [id number]  Phone: [phone] Fax: [phone]    [facility] [employer] [id number] [place], [place] - [address] AT [facility] [address]  [facility] [address]  [facility] [address]  Phone: [phone] Fax: [phone]      Initial Assessment (most recent)     Adult Discharge Assessment - 01/12/22 1116        Discharge Assessment    Assessment Type Discharge Planning Assessment     Confirmed/corrected address, phone number and insurance Yes     Confirmed Demographics Correct on Facesheet     Source of Information patient     Reason For Admission Hypokalemia     Lives With spouse     Facility Arrived From: Home     Do you expect to return to your current living situation? Yes     Do you have help at home or someone to help you manage your care at home? Yes     Who are your caregiver(s) and their phone number(s)? Enrique Delgadillo [phone]     Home Layout Able to live on 1st floor     Equipment Currently Used at Home none     Readmission within 30 days? No     Patient currently being followed by outpatient case management? No     Do you currently have service(s) that help you manage your care at home? No     Do you take prescription medications?  Yes     Is the patient taking medications as prescribed? yes     Who is going to help you get home at discharge? Enrique Delgadillo     How do you get to doctors appointments? car, drives self     Are you on dialysis? No     Do you take coumadin? No     Discharge Plan A Home     Discharge Plan B Home with family     DME Needed Upon Discharge  none     Discharge Plan discussed with: Patient     Discharge Barriers Identified None        Relationship/Environment    Name(s) of Who Lives With Patient Enrique Delgadillo               The SW met with patient  in room 1006 to complete DPA. Questions answered / contact numbers provided.  The SW provided the patient with a discharge planning booklet. Use PREFERRED PHARMACY / BEDSIDE DELIVERY for any necessary medications at time of discharge. The patient independent with all ADLs - does not use DME, In-home equipment, is not on HD, BTs or home oxygen. The patient's family will be assisting with help upon discharge. The patient's family will be providing transportation home. Hospital follow up will be scheduled with PCP. Will continue to follow for course of hospitalization.     Norah Oconnell LMSW  Case Management [facility]  Ext: [phone]

## 2022-01-13 VITALS
HEART RATE: 76 BPM | BODY MASS INDEX: 27.31 KG/M2 | TEMPERATURE: 98 F | DIASTOLIC BLOOD PRESSURE: 68 MMHG | HEIGHT: 64 IN | SYSTOLIC BLOOD PRESSURE: 118 MMHG | WEIGHT: 160 LBS | OXYGEN SATURATION: 97 % | RESPIRATION RATE: 16 BRPM

## 2022-01-13 PROBLEM — E87.1 HYPONATREMIA: Status: RESOLVED | Noted: 2019-08-21 | Resolved: 2022-01-13

## 2022-01-13 PROBLEM — E87.6 HYPOKALEMIA: Status: RESOLVED | Noted: 2022-01-11 | Resolved: 2022-01-13

## 2022-01-13 LAB
ALBUMIN SERPL BCP-MCNC: 2.9 G/DL (ref 3.5–5.2)
ALP SERPL-CCNC: 142 U/L (ref 55–135)
ALT SERPL W/O P-5'-P-CCNC: 20 U/L (ref 10–44)
ANION GAP SERPL CALC-SCNC: 9 MMOL/L (ref 8–16)
AST SERPL-CCNC: 22 U/L (ref 10–40)
BILIRUB SERPL-MCNC: 1.9 MG/DL (ref 0.1–1)
BUN SERPL-MCNC: 9 MG/DL (ref 6–20)
CALCIUM SERPL-MCNC: 8.5 MG/DL (ref 8.7–10.5)
CHLORIDE SERPL-SCNC: 104 MMOL/L (ref 95–110)
CO2 SERPL-SCNC: 23 MMOL/L (ref 23–29)
CREAT SERPL-MCNC: 0.7 MG/DL (ref 0.5–1.4)
EST. GFR  (AFRICAN AMERICAN): >60 ML/MIN/1.73 M^2
EST. GFR  (NON AFRICAN AMERICAN): >60 ML/MIN/1.73 M^2
GLUCOSE SERPL-MCNC: 234 MG/DL (ref 70–110)
MAGNESIUM SERPL-MCNC: 1.8 MG/DL (ref 1.6–2.6)
POCT GLUCOSE: 231 MG/DL (ref 70–110)
POCT GLUCOSE: 295 MG/DL (ref 70–110)
POTASSIUM SERPL-SCNC: 3.3 MMOL/L (ref 3.5–5.1)
PROT SERPL-MCNC: 5.2 G/DL (ref 6–8.4)
SODIUM SERPL-SCNC: 136 MMOL/L (ref 136–145)

## 2022-01-13 PROCEDURE — 99239 HOSP IP/OBS DSCHRG MGMT >30: CPT | Mod: ,,, | Performed by: INTERNAL MEDICINE

## 2022-01-13 PROCEDURE — 99239 PR HOSPITAL DISCHARGE DAY,>30 MIN: ICD-10-PCS | Mod: ,,, | Performed by: INTERNAL MEDICINE

## 2022-01-13 PROCEDURE — 36415 COLL VENOUS BLD VENIPUNCTURE: CPT | Performed by: STUDENT IN AN ORGANIZED HEALTH CARE EDUCATION/TRAINING PROGRAM

## 2022-01-13 PROCEDURE — 83735 ASSAY OF MAGNESIUM: CPT | Performed by: STUDENT IN AN ORGANIZED HEALTH CARE EDUCATION/TRAINING PROGRAM

## 2022-01-13 PROCEDURE — 25000003 PHARM REV CODE 250: Performed by: INTERNAL MEDICINE

## 2022-01-13 PROCEDURE — 25000003 PHARM REV CODE 250: Performed by: STUDENT IN AN ORGANIZED HEALTH CARE EDUCATION/TRAINING PROGRAM

## 2022-01-13 PROCEDURE — 80053 COMPREHEN METABOLIC PANEL: CPT | Performed by: STUDENT IN AN ORGANIZED HEALTH CARE EDUCATION/TRAINING PROGRAM

## 2022-01-13 RX ORDER — POTASSIUM CHLORIDE 20 MEQ/1
40 TABLET, EXTENDED RELEASE ORAL EVERY 4 HOURS
Status: COMPLETED | OUTPATIENT
Start: 2022-01-13 | End: 2022-01-13

## 2022-01-13 RX ADMIN — DULOXETINE 60 MG: 60 CAPSULE, DELAYED RELEASE ORAL at 08:01

## 2022-01-13 RX ADMIN — SPIRONOLACTONE 200 MG: 100 TABLET ORAL at 08:01

## 2022-01-13 RX ADMIN — CYANOCOBALAMIN TAB 250 MCG 250 MCG: 250 TAB at 08:01

## 2022-01-13 RX ADMIN — GABAPENTIN 300 MG: 300 CAPSULE ORAL at 08:01

## 2022-01-13 RX ADMIN — INSULIN ASPART 6 UNITS: 100 INJECTION, SOLUTION INTRAVENOUS; SUBCUTANEOUS at 11:01

## 2022-01-13 RX ADMIN — POTASSIUM CHLORIDE 40 MEQ: 1500 TABLET, EXTENDED RELEASE ORAL at 08:01

## 2022-01-13 RX ADMIN — INSULIN ASPART 4 UNITS: 100 INJECTION, SOLUTION INTRAVENOUS; SUBCUTANEOUS at 07:01

## 2022-01-13 RX ADMIN — GABAPENTIN 300 MG: 300 CAPSULE ORAL at 02:01

## 2022-01-13 RX ADMIN — POTASSIUM CHLORIDE 40 MEQ: 1500 TABLET, EXTENDED RELEASE ORAL at 02:01

## 2022-01-13 RX ADMIN — Medication 100 MG: at 08:01

## 2022-01-13 RX ADMIN — FOLIC ACID 1 MG: 1 TABLET ORAL at 08:01

## 2022-01-13 NOTE — ASSESSMENT & PLAN NOTE
- Patient with low potassium that did not respond to attempt to replete outpatient  - 2.0 on admission  - Patient is asymptomatic  - K normal AM 1/12, no supplementation given  - Continue to trend K and replete as warranted

## 2022-01-13 NOTE — HOSPITAL COURSE
Hypokalemia managed with aggressive repletion. Hyponatremia treated with multiple IVF boluses with improved. Patient asymptomatic during admission. Discharged home with follow up.

## 2022-01-13 NOTE — PROGRESS NOTES
"[facility] Medicine  Progress Note    Patient Name: Brunilda Delgadillo  MRN: [id number]  Patient Class: IP- Inpatient   Admission Date: 1/10/2022  Length of Stay: 1 days  Attending Physician: Evangelist Byers MD  Primary Care Provider: Brett Michele MD        Subjective:     Principal Problem:Hypokalemia        HPI:  Patient is a 56 y.o. F with history of liver cirrhosis, T2DM, and depression, presents to ED by request of PCP for hypokalemia and hyponatremia. Patient reports that she had her lab check 3 days ago, found to have hypokalemia. Her PCP recommended her to hold Lasix, they treated her with 40 meq of Kdur. They rechecked her labs today, found to have persistent hypokelemia, so they recommended her to come to the ED for further evaluation. Patient reports feeling "fine" generally. Denies fever, chills, chest pain, palpation, dizziness, or urinary symptoms. Reports compliance with her medication and insulin regimen. Patient is not on transplant list at this point.      Overview/Hospital Course:  No notes on file    Interval History:   - No events overnight  - No complaints  - AM Na 135, no IVF given. 131 on repeat in PM, and 1L NS given  - No K repletion given, K 4.0    Review of Systems   Constitutional: Negative for chills, diaphoresis, fatigue and fever.   HENT: Negative for hearing loss, rhinorrhea, sneezing and sore throat.    Respiratory: Negative for cough, chest tightness and shortness of breath.    Cardiovascular: Negative for chest pain and palpitations.   Gastrointestinal: Negative for abdominal distention, abdominal pain, constipation, diarrhea and nausea.   Endocrine: Negative for cold intolerance.   Genitourinary: Negative for dysuria and hematuria.   Musculoskeletal: Negative for arthralgias and myalgias.   Neurological: Negative for dizziness, tremors, weakness, numbness and headaches.   Psychiatric/Behavioral: Negative for agitation, behavioral problems and confusion. "     Objective:     Vital Signs (Most Recent):  Temp: 98.3 °F (36.8 °C) (01/12/22 1603)  Pulse: 90 (01/12/22 1603)  Resp: 16 (01/12/22 1603)  BP: 132/70 (01/12/22 1603)  SpO2: 98 % (01/12/22 1603) Vital Signs (24h Range):  Temp:  [97 °F (36.1 °C)-98.3 °F (36.8 °C)] 98.3 °F (36.8 °C)  Pulse:  [74-90] 90  Resp:  [16-18] 16  SpO2:  [93 %-99 %] 98 %  BP: (111-132)/(65-75) 132/70     Weight: 72.6 kg (160 lb)  Body mass index is 27.46 kg/m².    Intake/Output Summary (Last 24 hours) at 1/12/2022 1941  Last data filed at 1/12/2022 1700  Gross per 24 hour   Intake 1850 ml   Output --   Net 1850 ml      Physical Exam  Constitutional:       General: She is not in acute distress.     Appearance: Normal appearance. She is normal weight. She is not diaphoretic.   HENT:      Nose: No congestion or rhinorrhea.      Mouth/Throat:      Mouth: Mucous membranes are moist.   Eyes:      Extraocular Movements: Extraocular movements intact.      Conjunctiva/sclera: Conjunctivae normal.   Cardiovascular:      Rate and Rhythm: Normal rate and regular rhythm.      Heart sounds: No murmur heard.  No gallop.    Pulmonary:      Effort: Pulmonary effort is normal. No respiratory distress.      Breath sounds: Normal breath sounds. No wheezing.   Abdominal:      General: Abdomen is flat. Bowel sounds are normal.      Palpations: Abdomen is soft. There is no mass.      Tenderness: There is no abdominal tenderness.   Musculoskeletal:         General: No swelling, tenderness or signs of injury.   Skin:     General: Skin is warm and dry.      Coloration: Skin is not jaundiced.      Findings: No bruising.   Neurological:      General: No focal deficit present.      Mental Status: She is alert and oriented to person, place, and time. Mental status is at baseline.      Sensory: No sensory deficit.   Psychiatric:         Mood and Affect: Mood normal.         Behavior: Behavior normal.         Significant Labs:   BMP:   Recent Labs   Lab 01/12/22  2928  01/12/22  0523 01/12/22  1350   *   < > 203*   *   < > 131*   K 4.0   < > 4.1   CL 99   < > 98   CO2 30*   < > 23   BUN 8   < > 10   CREATININE 0.7   < > 0.8   CALCIUM 8.8   < > 9.0   MG 2.2  --   --     < > = values in this interval not displayed.       Significant Imaging: I have reviewed all pertinent imaging results/findings within the past 24 hours.      Assessment/Plan:      * Hypokalemia  - Patient with low potassium that did not respond to attempt to replete outpatient  - 2.0 on admission  - Patient is asymptomatic  - K normal AM 1/12, no supplementation given  - Continue to trend K and replete as warranted      Hyponatremia  - Likely hypovolemic hyponatremia from diuresis  - Asymptomatic  - 126 on admit  - PM Na 131, 1L IVF given  - Continue to trend      Type 2 diabetes mellitus  - Continue half dose of patient's basal regimen and moderate SSI  - POCT Glucose AC and HS  - Continue gabapentin for neuropathy     Depression  - Mood normal  - Continue cymbalta 60mg      Alcoholic cirrhosis of liver  - Continue spironolactone 200mg daily        VTE Risk Mitigation (From admission, onward)         Ordered     enoxaparin injection 40 mg  Daily         01/11/22 0111     IP VTE LOW RISK PATIENT  Once         01/11/22 0108     Place LASHAWN hose  Until discontinued         01/11/22 0108     IP VTE LOW RISK PATIENT  Once         01/11/22 0108     Place sequential compression device  Until discontinued         01/11/22 0108                Discharge Planning   EDUARDO: 1/13/2022     Code Status: Full Code   Is the patient medically ready for discharge?: No    Reason for patient still in hospital (select all that apply): Laboratory test, Treatment and Pending disposition  Discharge Plan A: Home                  Evangelist Byers MD  Department of Hospital Medicine   [facility]

## 2022-01-13 NOTE — HPI
"Patient is a 56 y.o. F with history of liver cirrhosis, T2DM, and depression, presents to ED by request of PCP for hypokalemia and hyponatremia. Patient reports that she had her lab check 3 days ago, found to have hypokalemia. Her PCP recommended her to hold Lasix, they treated her with 40 meq of Kdur. They rechecked her labs today, found to have persistent hypokelemia, so they recommended her to come to the ED for further evaluation. Patient reports feeling "fine" generally. Denies fever, chills, chest pain, palpation, dizziness, or urinary symptoms. Reports compliance with her medication and insulin regimen. Patient is not on transplant list at this point.  "

## 2022-01-13 NOTE — SUBJECTIVE & OBJECTIVE
Interval History:   - No events overnight  - No complaints  - AM Na 135, no IVF given. 131 on repeat in PM, and 1L NS given  - No K repletion given, K 4.0    Review of Systems   Constitutional: Negative for chills, diaphoresis, fatigue and fever.   HENT: Negative for hearing loss, rhinorrhea, sneezing and sore throat.    Respiratory: Negative for cough, chest tightness and shortness of breath.    Cardiovascular: Negative for chest pain and palpitations.   Gastrointestinal: Negative for abdominal distention, abdominal pain, constipation, diarrhea and nausea.   Endocrine: Negative for cold intolerance.   Genitourinary: Negative for dysuria and hematuria.   Musculoskeletal: Negative for arthralgias and myalgias.   Neurological: Negative for dizziness, tremors, weakness, numbness and headaches.   Psychiatric/Behavioral: Negative for agitation, behavioral problems and confusion.     Objective:     Vital Signs (Most Recent):  Temp: 98.3 °F (36.8 °C) (01/12/22 1603)  Pulse: 90 (01/12/22 1603)  Resp: 16 (01/12/22 1603)  BP: 132/70 (01/12/22 1603)  SpO2: 98 % (01/12/22 1603) Vital Signs (24h Range):  Temp:  [97 °F (36.1 °C)-98.3 °F (36.8 °C)] 98.3 °F (36.8 °C)  Pulse:  [74-90] 90  Resp:  [16-18] 16  SpO2:  [93 %-99 %] 98 %  BP: (111-132)/(65-75) 132/70     Weight: 72.6 kg (160 lb)  Body mass index is 27.46 kg/m².    Intake/Output Summary (Last 24 hours) at 1/12/2022 1941  Last data filed at 1/12/2022 1700  Gross per 24 hour   Intake 1850 ml   Output --   Net 1850 ml      Physical Exam  Constitutional:       General: She is not in acute distress.     Appearance: Normal appearance. She is normal weight. She is not diaphoretic.   HENT:      Nose: No congestion or rhinorrhea.      Mouth/Throat:      Mouth: Mucous membranes are moist.   Eyes:      Extraocular Movements: Extraocular movements intact.      Conjunctiva/sclera: Conjunctivae normal.   Cardiovascular:      Rate and Rhythm: Normal rate and regular rhythm.      Heart  sounds: No murmur heard.  No gallop.    Pulmonary:      Effort: Pulmonary effort is normal. No respiratory distress.      Breath sounds: Normal breath sounds. No wheezing.   Abdominal:      General: Abdomen is flat. Bowel sounds are normal.      Palpations: Abdomen is soft. There is no mass.      Tenderness: There is no abdominal tenderness.   Musculoskeletal:         General: No swelling, tenderness or signs of injury.   Skin:     General: Skin is warm and dry.      Coloration: Skin is not jaundiced.      Findings: No bruising.   Neurological:      General: No focal deficit present.      Mental Status: She is alert and oriented to person, place, and time. Mental status is at baseline.      Sensory: No sensory deficit.   Psychiatric:         Mood and Affect: Mood normal.         Behavior: Behavior normal.         Significant Labs:   BMP:   Recent Labs   Lab 01/12/22  0523 01/12/22  0523 01/12/22  1350   *   < > 203*   *   < > 131*   K 4.0   < > 4.1   CL 99   < > 98   CO2 30*   < > 23   BUN 8   < > 10   CREATININE 0.7   < > 0.8   CALCIUM 8.8   < > 9.0   MG 2.2  --   --     < > = values in this interval not displayed.       Significant Imaging: I have reviewed all pertinent imaging results/findings within the past 24 hours.

## 2022-01-13 NOTE — PLAN OF CARE
PLAN IS TO DISCHARGE PT HOME WITH NO NEEDD FOLLOW UP APPTS ARE ON THE AVS  [facility]  Discharge Final Note    Primary Care Provider: Brett Michele MD    Expected Discharge Date: 1/13/2022    Final Discharge Note (most recent)     Final Note - 01/13/22 1041        Final Note    Assessment Type Final Discharge Note     Anticipated Discharge Disposition Home or Self Care     Hospital Resources/Appts/Education Provided Appointments scheduled and added to AVS        Post-Acute Status    Discharge Delays None known at this time                 Important Message from Medicare             Contact Info     Brett Michele MD   Specialty: General Practice   Relationship: PCP - General    [address]  [id number]  [place] [address]   Phone: [phone]       Next Steps: Follow up on 1/19/2022    Instructions: APPT @2:45

## 2022-01-13 NOTE — PLAN OF CARE
POC reviewed. AAOx4, VSS on RA with no complaints of SOB, headaches, or dizziness. Urine output as unmeasured occurrences to BR. Tolerating regular diet, no BM and no complaints of N/V. Denies pain. Blood glucose monitored ACHS, at bedtime, 278, gave 3 units asprat insulin. Resting with side rails up and call light with in reach. Continuing to monitor patient status.

## 2022-01-13 NOTE — ASSESSMENT & PLAN NOTE
- Likely hypovolemic hyponatremia from diuresis  - Asymptomatic  - 126 on admit  - PM Na 131, 1L IVF given  - Continue to trend

## 2022-01-13 NOTE — ASSESSMENT & PLAN NOTE
- Continue half dose of patient's basal regimen and moderate SSI  - POCT Glucose AC and HS  - Continue gabapentin for neuropathy

## 2022-01-13 NOTE — PLAN OF CARE
Problem: Adult Inpatient Plan of Care  Goal: Plan of Care Review  Outcome: Adequate for Care Transition  Goal: Patient-Specific Goal (Individualized)  Outcome: Adequate for Care Transition  Goal: Absence of Hospital-Acquired Illness or Injury  Outcome: Adequate for Care Transition  Goal: Optimal Comfort and Wellbeing  Outcome: Adequate for Care Transition  Goal: Readiness for Transition of Care  Outcome: Adequate for Care Transition     Problem: Diabetes Comorbidity  Goal: Blood Glucose Level Within Targeted Range  Outcome: Adequate for Care Transition     Problem: Fall Injury Risk  Goal: Absence of Fall and Fall-Related Injury  Outcome: Adequate for Care Transition

## 2022-01-20 NOTE — DISCHARGE SUMMARY
"[facility] - Counts include 234 beds at the [facility] Medicine  Discharge Summary      Patient Name: Brunilda Delgadillo  MRN: [id number]  Patient Class: IP- Inpatient  Admission Date: 1/10/2022  Hospital Length of Stay: 2 days  Discharge Date and Time: 1/13/2022  4:07 PM  Attending Physician: No att. providers found   Discharging Provider: Jose Carlos Espino MD  Primary Care Provider: Brett Michele MD      HPI:   Patient is a 56 y.o. F with history of liver cirrhosis, T2DM, and depression, presents to ED by request of PCP for hypokalemia and hyponatremia. Patient reports that she had her lab check 3 days ago, found to have hypokalemia. Her PCP recommended her to hold Lasix, they treated her with 40 meq of Kdur. They rechecked her labs today, found to have persistent hypokelemia, so they recommended her to come to the ED for further evaluation. Patient reports feeling "fine" generally. Denies fever, chills, chest pain, palpation, dizziness, or urinary symptoms. Reports compliance with her medication and insulin regimen. Patient is not on transplant list at this point.      * No surgery found *      Hospital Course:   Hypokalemia managed with aggressive repletion. Hyponatremia treated with multiple IVF boluses with improved. Patient asymptomatic during admission. Discharged home with follow up.       Goals of Care Treatment Preferences:  Code Status: Full Code      Consults:     * Hypokalemia  - Patient with low potassium that did not respond to attempt to replete outpatient  - 2.0 on admission  - Patient is asymptomatic  - K normal AM 1/12, no supplementation given  - Continue to trend K and replete as warranted  - RESOLVED      Depression  - Mood normal  - Continue cymbalta 60mg      Hyponatremia  - Likely hypovolemic hyponatremia from diuresis  - Asymptomatic  - 126 on admit  - PM Na 131, 1L IVF given  - Continue to trend  - RESOLVED      Type 2 diabetes mellitus  - Continue half dose of patient's basal regimen and moderate SSI  - POCT " Glucose AC and HS  - Continue gabapentin for neuropathy     Alcoholic cirrhosis of liver  - Continue spironolactone 200mg daily      Final Active Diagnoses:    Diagnosis Date Noted POA    PRINCIPAL PROBLEM:  Hypokalemia [E87.6] 01/11/2022 Yes    Hyponatremia [E87.1] 08/21/2019 Yes    Type 2 diabetes mellitus [E11.9] 08/21/2019 Yes    Depression [F32.A] 08/21/2019 Yes    Alcoholic cirrhosis of liver [K70.30] 07/29/2019 Yes      Problems Resolved During this Admission:       Discharged Condition: good    Disposition: Home or Self Care    Follow Up:   Follow-up Information     Brett Michele MD On 1/19/2022.    Specialty: General Practice  Why: APPT @2:45  Contact information:  [address] [facility]  [id number]  [place] [address] [phone]                       Patient Instructions:      Ambulatory referral/consult to Endocrinology   Standing Status: Future   Referral Priority: Routine Referral Type: Consultation   Requested Specialty: Endocrinology   Number of Visits Requested: 1     Diet diabetic   Order Comments: Low sodium diet: 2 grams per day     Notify your health care provider if you experience any of the following:  temperature >100.4     Notify your health care provider if you experience any of the following:  persistent nausea and vomiting or diarrhea     Notify your health care provider if you experience any of the following:  severe uncontrolled pain     Notify your health care provider if you experience any of the following:  redness, tenderness, or signs of infection (pain, swelling, redness, odor or green/yellow discharge around incision site)     Notify your health care provider if you experience any of the following:  difficulty breathing or increased cough     Notify your health care provider if you experience any of the following:  severe persistent headache     Notify your health care provider if you experience any of the following:  worsening rash     Notify your health care provider if you  "experience any of the following:  persistent dizziness, light-headedness, or visual disturbances     Notify your health care provider if you experience any of the following:  increased confusion or weakness     Activity as tolerated       Significant Diagnostic Studies: Labs:   CMP No results for input(s): NA, K, CL, CO2, GLU, BUN, CREATININE, CALCIUM, PROT, ALBUMIN, BILITOT, ALKPHOS, AST, ALT, ANIONGAP, ESTGFRAFRICA, EGFRNONAA in the last 48 hours., CBC No results for input(s): WBC, HGB, HCT, PLT in the last 48 hours., INR   Lab Results   Component Value Date    INR 1.0 01/10/2022    INR 1.1 01/07/2022    INR 1.1 01/06/2022   , Lipid Panel No results found for: CHOL, HDL, LDLCALC, TRIG, CHOLHDL, Troponin No results for input(s): TROPONINI in the last 168 hours. and A1C: No results for input(s): HGBA1C in the last 4320 hours.    Pending Diagnostic Studies:     None             Medications:  Reconciled Home Medications:      Medication List      START taking these medications    insulin detemir U-100 100 unit/mL (3 mL) Inpn pen  Commonly known as: Levemir FLEXTOUCH  Inject 40 Units into the skin every evening.  Replaces: LEVEMIR U-100 INSULIN SUBQ        CONTINUE taking these medications    alendronate-vitamin D3 70 mg- 2,800 unit per tablet  Commonly known as: FOSAMAX PLUS D  Take 1 tablet by mouth every 7 days.     cyanocobalamin 100 MCG tablet  Commonly known as: VITAMIN B-12  Take 100 mcg by mouth once daily.     DULoxetine 60 MG capsule  Commonly known as: CYMBALTA  Take 60 mg by mouth once daily.     gabapentin 300 MG capsule  Commonly known as: NEURONTIN  TAKE 1 CAPSULE IN THE MORNING AND 1 CAPSULE AT LUNCH AND 3 CAPSULE(S) AT NIGHT     MULTIVITAMIN 50 PLUS ORAL  Take by mouth.     pen needle, diabetic 30 gauge x 1/3" Ndle  NovoFine 30 30 gauge x 1/3" needle     spironolactone 100 MG tablet  Commonly known as: ALDACTONE  Take 2 tablets (200 mg total) by mouth once daily.     thiamine 100 MG tablet  Take 100 mg " by mouth.        STOP taking these medications    LANTUS SOLOSTAR U-100 INSULIN glargine 100 units/mL (3mL) SubQ pen  Generic drug: insulin     LEVEMIR U-100 INSULIN SUBQ  Replaced by: insulin detemir U-100 100 unit/mL (3 mL) Inpn pen     potassium chloride SA 20 MEQ tablet  Commonly known as: K-DUR,KLOR-CON            Indwelling Lines/Drains at time of discharge:   Lines/Drains/Airways     None                 Time spent on the discharge of patient: 45 minutes         Jose Carlos Espino MD  Department of Hospital Medicine  [facility]

## 2022-01-20 NOTE — ASSESSMENT & PLAN NOTE
- Patient with low potassium that did not respond to attempt to replete outpatient  - 2.0 on admission  - Patient is asymptomatic  - K normal AM 1/12, no supplementation given  - Continue to trend K and replete as warranted  - RESOLVED

## 2022-01-20 NOTE — ASSESSMENT & PLAN NOTE
- Likely hypovolemic hyponatremia from diuresis  - Asymptomatic  - 126 on admit  - PM Na 131, 1L IVF given  - Continue to trend  - RESOLVED

## 2022-02-02 ENCOUNTER — LAB VISIT (OUTPATIENT)
Dept: LAB | Facility: HOSPITAL | Age: 57
End: 2022-02-02
Attending: INTERNAL MEDICINE
Payer: COMMERCIAL

## 2022-02-02 DIAGNOSIS — K74.60 HEPATIC CIRRHOSIS, UNSPECIFIED HEPATIC CIRRHOSIS TYPE, UNSPECIFIED WHETHER ASCITES PRESENT: ICD-10-CM

## 2022-02-02 LAB
ALBUMIN SERPL BCP-MCNC: 3.8 G/DL (ref 3.5–5.2)
ALP SERPL-CCNC: 185 U/L (ref 55–135)
ALT SERPL W/O P-5'-P-CCNC: 23 U/L (ref 10–44)
ANION GAP SERPL CALC-SCNC: 8 MMOL/L (ref 8–16)
AST SERPL-CCNC: 22 U/L (ref 10–40)
BASOPHILS # BLD AUTO: 0.04 K/UL (ref 0–0.2)
BASOPHILS NFR BLD: 0.6 % (ref 0–1.9)
BILIRUB SERPL-MCNC: 1.7 MG/DL (ref 0.1–1)
BUN SERPL-MCNC: 9 MG/DL (ref 6–20)
CALCIUM SERPL-MCNC: 9.5 MG/DL (ref 8.7–10.5)
CHLORIDE SERPL-SCNC: 93 MMOL/L (ref 95–110)
CO2 SERPL-SCNC: 26 MMOL/L (ref 23–29)
CREAT SERPL-MCNC: 0.8 MG/DL (ref 0.5–1.4)
DIFFERENTIAL METHOD: ABNORMAL
EOSINOPHIL # BLD AUTO: 0.5 K/UL (ref 0–0.5)
EOSINOPHIL NFR BLD: 6.6 % (ref 0–8)
ERYTHROCYTE [DISTWIDTH] IN BLOOD BY AUTOMATED COUNT: 13.3 % (ref 11.5–14.5)
EST. GFR  (AFRICAN AMERICAN): >60 ML/MIN/1.73 M^2
EST. GFR  (NON AFRICAN AMERICAN): >60 ML/MIN/1.73 M^2
GLUCOSE SERPL-MCNC: 278 MG/DL (ref 70–110)
HCT VFR BLD AUTO: 44.3 % (ref 37–48.5)
HGB BLD-MCNC: 14.5 G/DL (ref 12–16)
IMM GRANULOCYTES # BLD AUTO: 0.04 K/UL (ref 0–0.04)
IMM GRANULOCYTES NFR BLD AUTO: 0.6 % (ref 0–0.5)
INR PPP: 1 (ref 0.8–1.2)
LYMPHOCYTES # BLD AUTO: 1.3 K/UL (ref 1–4.8)
LYMPHOCYTES NFR BLD: 18.2 % (ref 18–48)
MCH RBC QN AUTO: 35 PG (ref 27–31)
MCHC RBC AUTO-ENTMCNC: 32.7 G/DL (ref 32–36)
MCV RBC AUTO: 107 FL (ref 82–98)
MONOCYTES # BLD AUTO: 0.6 K/UL (ref 0.3–1)
MONOCYTES NFR BLD: 8.2 % (ref 4–15)
NEUTROPHILS # BLD AUTO: 4.6 K/UL (ref 1.8–7.7)
NEUTROPHILS NFR BLD: 65.8 % (ref 38–73)
NRBC BLD-RTO: 0 /100 WBC
PLATELET # BLD AUTO: 140 K/UL (ref 150–450)
PMV BLD AUTO: 10.1 FL (ref 9.2–12.9)
POTASSIUM SERPL-SCNC: 4.9 MMOL/L (ref 3.5–5.1)
PROT SERPL-MCNC: 7.3 G/DL (ref 6–8.4)
PROTHROMBIN TIME: 11.4 SEC (ref 9–12.5)
RBC # BLD AUTO: 4.14 M/UL (ref 4–5.4)
SODIUM SERPL-SCNC: 127 MMOL/L (ref 136–145)
WBC # BLD AUTO: 6.99 K/UL (ref 3.9–12.7)

## 2022-02-02 PROCEDURE — 80321 ALCOHOLS BIOMARKERS 1OR 2: CPT | Performed by: INTERNAL MEDICINE

## 2022-02-02 PROCEDURE — 85610 PROTHROMBIN TIME: CPT | Performed by: INTERNAL MEDICINE

## 2022-02-02 PROCEDURE — 36415 COLL VENOUS BLD VENIPUNCTURE: CPT | Mod: PN | Performed by: INTERNAL MEDICINE

## 2022-02-02 PROCEDURE — 85025 COMPLETE CBC W/AUTO DIFF WBC: CPT | Performed by: INTERNAL MEDICINE

## 2022-02-02 PROCEDURE — 80053 COMPREHEN METABOLIC PANEL: CPT | Performed by: INTERNAL MEDICINE

## 2022-02-04 ENCOUNTER — OFFICE VISIT (OUTPATIENT)
Dept: HEPATOLOGY | Facility: CLINIC | Age: 57
End: 2022-02-04
Payer: COMMERCIAL

## 2022-02-04 VITALS
BODY MASS INDEX: 30.66 KG/M2 | TEMPERATURE: 98 F | SYSTOLIC BLOOD PRESSURE: 132 MMHG | DIASTOLIC BLOOD PRESSURE: 77 MMHG | WEIGHT: 179.56 LBS | HEART RATE: 98 BPM | OXYGEN SATURATION: 98 % | RESPIRATION RATE: 17 BRPM | HEIGHT: 64 IN

## 2022-02-04 DIAGNOSIS — R18.8 OTHER ASCITES: ICD-10-CM

## 2022-02-04 DIAGNOSIS — I85.00 ESOPHAGEAL VARICES WITHOUT BLEEDING, UNSPECIFIED ESOPHAGEAL VARICES TYPE: ICD-10-CM

## 2022-02-04 DIAGNOSIS — K70.30 ALCOHOLIC CIRRHOSIS OF LIVER WITHOUT ASCITES: Primary | ICD-10-CM

## 2022-02-04 DIAGNOSIS — G62.9 NEUROPATHY: ICD-10-CM

## 2022-02-04 DIAGNOSIS — E13.69 OTHER SPECIFIED DIABETES MELLITUS WITH OTHER SPECIFIED COMPLICATION, UNSPECIFIED WHETHER LONG TERM INSULIN USE: ICD-10-CM

## 2022-02-04 PROCEDURE — 3008F BODY MASS INDEX DOCD: CPT | Mod: CPTII,S$GLB,, | Performed by: INTERNAL MEDICINE

## 2022-02-04 PROCEDURE — 1160F PR REVIEW ALL MEDS BY PRESCRIBER/CLIN PHARMACIST DOCUMENTED: ICD-10-PCS | Mod: CPTII,S$GLB,, | Performed by: INTERNAL MEDICINE

## 2022-02-04 PROCEDURE — 99999 PR PBB SHADOW E&M-EST. PATIENT-LVL V: ICD-10-PCS | Mod: PBBFAC,,, | Performed by: INTERNAL MEDICINE

## 2022-02-04 PROCEDURE — 3075F PR MOST RECENT SYSTOLIC BLOOD PRESS GE 130-139MM HG: ICD-10-PCS | Mod: CPTII,S$GLB,, | Performed by: INTERNAL MEDICINE

## 2022-02-04 PROCEDURE — 3078F DIAST BP <80 MM HG: CPT | Mod: CPTII,S$GLB,, | Performed by: INTERNAL MEDICINE

## 2022-02-04 PROCEDURE — 3078F PR MOST RECENT DIASTOLIC BLOOD PRESSURE < 80 MM HG: ICD-10-PCS | Mod: CPTII,S$GLB,, | Performed by: INTERNAL MEDICINE

## 2022-02-04 PROCEDURE — 1111F PR DISCHARGE MEDS RECONCILED W/ CURRENT OUTPATIENT MED LIST: ICD-10-PCS | Mod: CPTII,S$GLB,, | Performed by: INTERNAL MEDICINE

## 2022-02-04 PROCEDURE — 1159F MED LIST DOCD IN RCRD: CPT | Mod: CPTII,S$GLB,, | Performed by: INTERNAL MEDICINE

## 2022-02-04 PROCEDURE — 3075F SYST BP GE 130 - 139MM HG: CPT | Mod: CPTII,S$GLB,, | Performed by: INTERNAL MEDICINE

## 2022-02-04 PROCEDURE — 1160F RVW MEDS BY RX/DR IN RCRD: CPT | Mod: CPTII,S$GLB,, | Performed by: INTERNAL MEDICINE

## 2022-02-04 PROCEDURE — 3008F PR BODY MASS INDEX (BMI) DOCUMENTED: ICD-10-PCS | Mod: CPTII,S$GLB,, | Performed by: INTERNAL MEDICINE

## 2022-02-04 PROCEDURE — 99214 OFFICE O/P EST MOD 30 MIN: CPT | Mod: S$GLB,,, | Performed by: INTERNAL MEDICINE

## 2022-02-04 PROCEDURE — 1111F DSCHRG MED/CURRENT MED MERGE: CPT | Mod: CPTII,S$GLB,, | Performed by: INTERNAL MEDICINE

## 2022-02-04 PROCEDURE — 1159F PR MEDICATION LIST DOCUMENTED IN MEDICAL RECORD: ICD-10-PCS | Mod: CPTII,S$GLB,, | Performed by: INTERNAL MEDICINE

## 2022-02-04 PROCEDURE — 99214 PR OFFICE/OUTPT VISIT, EST, LEVL IV, 30-39 MIN: ICD-10-PCS | Mod: S$GLB,,, | Performed by: INTERNAL MEDICINE

## 2022-02-04 PROCEDURE — 99999 PR PBB SHADOW E&M-EST. PATIENT-LVL V: CPT | Mod: PBBFAC,,, | Performed by: INTERNAL MEDICINE

## 2022-02-04 RX ORDER — FOLIC ACID 1 MG/1
1 TABLET ORAL DAILY
Qty: 30 TABLET | Refills: 9 | Status: SHIPPED | OUTPATIENT
Start: 2022-02-04 | End: 2022-11-29

## 2022-02-04 NOTE — PROGRESS NOTES
HEPATOLOGY FOLLOW UP    Referring Physician: Brett Michele MD  Current Corresponding Physician: Brett Michele MD    Brunilda Delgadillo is here for follow up of No chief complaint on file.  She is a 54 y.o. 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 She has a 14 and 17 year old who live at home.     --pt has drank alcohol most of her adult life; last drink ~1 month ago  --developed ascites June 2019 and has had 3 paracenteses  --no HE  --PHG but no varices on EGD 11/16        Interval History  Since Brunilda Vibha Leona's last visit she finally agreed to be admitted for electrolyte abnormalities:  Admission 1/10/22-1/13/22: K 2.0, Na 126 on admission; discharged on spironolactone 200 mg daily    Since discharge labs:  Labs 2/2/22: Na 127, BS elevated at 278, K 4.9, creat 0.8, Tbil 1.7    Alcohol: continues to drink alcohol. She states she drinks to treat the pain in her feet caused by neuropathy, although pain better on neurontin.    Ascites:  She continues on spironolactone 200 mg daily but not  lasix or metolazone; Last LVP was 3.8 L on 10/21/19 (LVPs (8/29/19, 9/12/19, 10/21/19).   HE: no symptoms  EGD 2/20: normal esophagus    Liver lesion:  MRI 5/29/21: Hepatic cirrhosis with sequela of portal venous hypertension in the form of splenomegaly and left upper abdominal collateral vascularization; Redemonstration of a subcentimeter nodule of T1 hyperintense signal at the hepatic dome.  No lesions concerning for hepatocellular carcinoma.  Abdo US 8/23/21: The hypoechoic lesion seen within the liver on previous ultrasound is not well visualized sonographically today; small hepatic cysts  MRI abdo w wo contrast 12/13/21: Liver: Nodular contour and heterogeneous parenchyma consistent with cirrhosis.  No arterial hyperenhancing lesion.  No contrast washout or pseudo capsule.  Stable subcentimeter cyst of the right hepatic dome.  Hepatic and portal veins are  "patent.    She remains medically early for liver transplant.     MELD-Na score: 8 at 2/2/2022  9:10 AM  MELD score: 8 at 2/2/2022  9:10 AM  Calculated from:  Serum Creatinine: 0.8 mg/dL (Using min of 1 mg/dL) at 2/2/2022  9:10 AM  Serum Sodium: 127 mmol/L at 2/2/2022  9:10 AM  Total Bilirubin: 1.7 mg/dL at 2/2/2022  9:10 AM  INR(ratio): 1.0 at 2/2/2022  9:10 AM  Age: 56 years    Outpatient Encounter Medications as of 2/4/2022   Medication Sig Dispense Refill    alendronate-vitamin D3 (FOSAMAX PLUS D) 70 mg- 2,800 unit per tablet Take 1 tablet by mouth every 7 days.      cyanocobalamin (VITAMIN B-12) 100 MCG tablet Take 100 mcg by mouth once daily.      DULoxetine (CYMBALTA) 60 MG capsule Take 60 mg by mouth once daily.       folic acid (FOLVITE) 1 MG tablet TAKE 1 TABLET (1 MG TOTAL) BY MOUTH ONCE DAILY. 30 tablet 9    gabapentin (NEURONTIN) 300 MG capsule TAKE 1 CAPSULE IN THE MORNING AND 1 CAPSULE AT LUNCH AND 3 CAPSULE(S) AT NIGHT 150 capsule 4    insulin detemir U-100 (LEVEMIR FLEXTOUCH) 100 unit/mL (3 mL) SubQ InPn pen Inject 40 Units into the skin every evening. 12 mL 2    multivit with minerals/lutein (MULTIVITAMIN 50 PLUS ORAL) Take by mouth.      pen needle, diabetic 30 gauge x 1/3" Ndle NovoFine 30 30 gauge x 1/3" needle      spironolactone (ALDACTONE) 100 MG tablet Take 2 tablets (200 mg total) by mouth once daily. 60 tablet 11    thiamine 100 MG tablet Take 100 mg by mouth.       No facility-administered encounter medications on file as of 2/4/2022.     Review of patient's allergies indicates:   Allergen Reactions    Penicillins      Rash as a child     Past Medical History:   Diagnosis Date    Anxiety disorder 8/21/2019    Ascites 8/21/2019    Cirrhosis     Depression 8/21/2019    Diabetes mellitus     History of cellulitis 8/21/2019    Hyponatremia 8/21/2019    Portal hypertensive gastropathy 8/21/2019    On EGD 11/16    Type 2 diabetes mellitus 8/21/2019       Review of Systems "   Constitutional: Negative.    HENT: Negative.    Eyes: Negative.    Respiratory: Negative.    Cardiovascular: Negative.    Gastrointestinal: Negative.    Genitourinary: Negative.    Musculoskeletal: Negative.    Skin: Negative.    Neurological: Negative.    Psychiatric/Behavioral: Negative.      Vitals:    02/04/22 1511   BP: 132/77   Pulse: 98   Resp: 17   Temp: 98.4 °F (36.9 °C)       Physical Exam  Vitals reviewed.   Constitutional:       Appearance: She is well-developed.   HENT:      Head: Normocephalic.   Eyes:      General: No scleral icterus.     Pupils: Pupils are equal, round, and reactive to light.   Neck:      Thyroid: No thyromegaly.   Cardiovascular:      Rate and Rhythm: Normal rate and regular rhythm.      Heart sounds: Normal heart sounds.   Pulmonary:      Effort: Pulmonary effort is normal.      Breath sounds: Normal breath sounds. No wheezing.   Abdominal:      General: There is no distension.      Palpations: Abdomen is soft. There is no mass.      Tenderness: There is no abdominal tenderness.   Musculoskeletal:         General: Normal range of motion.      Cervical back: Neck supple.   Skin:     General: Skin is warm and dry.      Findings: No rash.   Neurological:      Mental Status: She is oriented to person, place, and time.         Lab Results   Component Value Date     (H) 02/02/2022    BUN 9 02/02/2022    CREATININE 0.8 02/02/2022    CALCIUM 9.5 02/02/2022     (L) 02/02/2022    K 4.9 02/02/2022    CL 93 (L) 02/02/2022    PROT 7.3 02/02/2022    CO2 26 02/02/2022    ANIONGAP 8 02/02/2022    WBC 6.99 02/02/2022    RBC 4.14 02/02/2022    HGB 14.5 02/02/2022    HCT 44.3 02/02/2022     (H) 02/02/2022    MCH 35.0 (H) 02/02/2022    MCHC 32.7 02/02/2022     Lab Results   Component Value Date    RDW 13.3 02/02/2022     (L) 02/02/2022    MPV 10.1 02/02/2022    GRAN 4.6 02/02/2022    GRAN 65.8 02/02/2022    LYMPH 1.3 02/02/2022    LYMPH 18.2 02/02/2022    MONO 0.6  02/02/2022    MONO 8.2 02/02/2022    EOSINOPHIL 6.6 02/02/2022    BASOPHIL 0.6 02/02/2022    EOS 0.5 02/02/2022    BASO 0.04 02/02/2022    GROUPTRH O POS 12/12/2019    ALBUMIN 3.8 02/02/2022    BILIDIR 0.7 (H) 12/12/2019    AST 22 02/02/2022    ALT 23 02/02/2022    ALKPHOS 185 (H) 02/02/2022    MG 1.8 01/13/2022    LABPROT 11.4 02/02/2022    INR 1.0 02/02/2022   MELD-Na score: 8 at 2/2/2022  9:10 AM  MELD score: 8 at 2/2/2022  9:10 AM  Calculated from:  Serum Creatinine: 0.8 mg/dL (Using min of 1 mg/dL) at 2/2/2022  9:10 AM  Serum Sodium: 127 mmol/L at 2/2/2022  9:10 AM  Total Bilirubin: 1.7 mg/dL at 2/2/2022  9:10 AM  INR(ratio): 1.0 at 2/2/2022  9:10 AM  Age: 56 years  Assessment and Plan:    Brunilda Delgadillo is a 56 y.o. female with decompensated alcohol-induced cirrhosis. Current recommendations:  1. Ascites/hypokalemia: continue on spironolactone 200 mg daily; prn paracentesis  2. Portal htn- no varices on EGD 2/20- repeat 2023  3. Cirrhosis, decompensated: meld labs every 4 weeks  4. Alcohol abuse, ongoing- counseled pt re abstinence  5. Liver lesion: not seen on MRI 12/21; abdo US 06/22  6. Neuropathy: continue gabapentin  7. DM- poor control- refer to endocrinology    Return 12 weeks    . .

## 2022-02-08 LAB
PETH 16:0/18.1 (POPETH): 345 NG/ML
PETH 16:0/18.2 (PLPETH): 317 NG/ML

## 2022-03-04 ENCOUNTER — LAB VISIT (OUTPATIENT)
Dept: LAB | Facility: HOSPITAL | Age: 57
End: 2022-03-04
Attending: INTERNAL MEDICINE
Payer: COMMERCIAL

## 2022-03-04 DIAGNOSIS — K70.30 ALCOHOLIC CIRRHOSIS OF LIVER WITHOUT ASCITES: ICD-10-CM

## 2022-03-04 LAB
ALBUMIN SERPL BCP-MCNC: 3.8 G/DL (ref 3.5–5.2)
ALP SERPL-CCNC: 202 U/L (ref 55–135)
ALT SERPL W/O P-5'-P-CCNC: 29 U/L (ref 10–44)
ANION GAP SERPL CALC-SCNC: 9 MMOL/L (ref 8–16)
AST SERPL-CCNC: 25 U/L (ref 10–40)
BASOPHILS # BLD AUTO: 0.04 K/UL (ref 0–0.2)
BASOPHILS NFR BLD: 0.6 % (ref 0–1.9)
BILIRUB SERPL-MCNC: 1.4 MG/DL (ref 0.1–1)
BUN SERPL-MCNC: 11 MG/DL (ref 6–20)
CALCIUM SERPL-MCNC: 9.8 MG/DL (ref 8.7–10.5)
CHLORIDE SERPL-SCNC: 92 MMOL/L (ref 95–110)
CO2 SERPL-SCNC: 29 MMOL/L (ref 23–29)
CREAT SERPL-MCNC: 0.8 MG/DL (ref 0.5–1.4)
DIFFERENTIAL METHOD: ABNORMAL
EOSINOPHIL # BLD AUTO: 0.4 K/UL (ref 0–0.5)
EOSINOPHIL NFR BLD: 5.6 % (ref 0–8)
ERYTHROCYTE [DISTWIDTH] IN BLOOD BY AUTOMATED COUNT: 12.3 % (ref 11.5–14.5)
EST. GFR  (AFRICAN AMERICAN): >60 ML/MIN/1.73 M^2
EST. GFR  (NON AFRICAN AMERICAN): >60 ML/MIN/1.73 M^2
GLUCOSE SERPL-MCNC: 178 MG/DL (ref 70–110)
HCT VFR BLD AUTO: 44.9 % (ref 37–48.5)
HGB BLD-MCNC: 14.9 G/DL (ref 12–16)
IMM GRANULOCYTES # BLD AUTO: 0.02 K/UL (ref 0–0.04)
IMM GRANULOCYTES NFR BLD AUTO: 0.3 % (ref 0–0.5)
INR PPP: 1.1 (ref 0.8–1.2)
LYMPHOCYTES # BLD AUTO: 1.3 K/UL (ref 1–4.8)
LYMPHOCYTES NFR BLD: 20.6 % (ref 18–48)
MCH RBC QN AUTO: 34.7 PG (ref 27–31)
MCHC RBC AUTO-ENTMCNC: 33.2 G/DL (ref 32–36)
MCV RBC AUTO: 104 FL (ref 82–98)
MONOCYTES # BLD AUTO: 0.6 K/UL (ref 0.3–1)
MONOCYTES NFR BLD: 9.3 % (ref 4–15)
NEUTROPHILS # BLD AUTO: 4.1 K/UL (ref 1.8–7.7)
NEUTROPHILS NFR BLD: 63.6 % (ref 38–73)
NRBC BLD-RTO: 0 /100 WBC
PLATELET # BLD AUTO: 139 K/UL (ref 150–450)
PMV BLD AUTO: 10.3 FL (ref 9.2–12.9)
POTASSIUM SERPL-SCNC: 4.8 MMOL/L (ref 3.5–5.1)
PROT SERPL-MCNC: 7.3 G/DL (ref 6–8.4)
PROTHROMBIN TIME: 11.3 SEC (ref 9–12.5)
RBC # BLD AUTO: 4.3 M/UL (ref 4–5.4)
SODIUM SERPL-SCNC: 130 MMOL/L (ref 136–145)
WBC # BLD AUTO: 6.45 K/UL (ref 3.9–12.7)

## 2022-03-04 PROCEDURE — 80053 COMPREHEN METABOLIC PANEL: CPT | Performed by: INTERNAL MEDICINE

## 2022-03-04 PROCEDURE — 85025 COMPLETE CBC W/AUTO DIFF WBC: CPT | Performed by: INTERNAL MEDICINE

## 2022-03-04 PROCEDURE — 80321 ALCOHOLS BIOMARKERS 1OR 2: CPT | Performed by: INTERNAL MEDICINE

## 2022-03-04 PROCEDURE — 85610 PROTHROMBIN TIME: CPT | Performed by: INTERNAL MEDICINE

## 2022-03-04 PROCEDURE — 36415 COLL VENOUS BLD VENIPUNCTURE: CPT | Mod: PN | Performed by: INTERNAL MEDICINE

## 2022-03-09 LAB
PETH 16:0/18.1 (POPETH): 396 NG/ML
PETH 16:0/18.2 (PLPETH): 308 NG/ML

## 2022-04-29 ENCOUNTER — LAB VISIT (OUTPATIENT)
Dept: LAB | Facility: HOSPITAL | Age: 57
End: 2022-04-29
Attending: INTERNAL MEDICINE
Payer: COMMERCIAL

## 2022-04-29 DIAGNOSIS — K70.30 ALCOHOLIC CIRRHOSIS OF LIVER WITHOUT ASCITES: ICD-10-CM

## 2022-04-29 LAB
ALBUMIN SERPL BCP-MCNC: 3.9 G/DL (ref 3.5–5.2)
ALP SERPL-CCNC: 232 U/L (ref 55–135)
ALT SERPL W/O P-5'-P-CCNC: 34 U/L (ref 10–44)
ANION GAP SERPL CALC-SCNC: 11 MMOL/L (ref 8–16)
AST SERPL-CCNC: 35 U/L (ref 10–40)
BASOPHILS # BLD AUTO: 0.07 K/UL (ref 0–0.2)
BASOPHILS NFR BLD: 0.8 % (ref 0–1.9)
BILIRUB SERPL-MCNC: 2.3 MG/DL (ref 0.1–1)
BUN SERPL-MCNC: 9 MG/DL (ref 6–20)
CALCIUM SERPL-MCNC: 10.2 MG/DL (ref 8.7–10.5)
CHLORIDE SERPL-SCNC: 91 MMOL/L (ref 95–110)
CO2 SERPL-SCNC: 28 MMOL/L (ref 23–29)
CREAT SERPL-MCNC: 0.8 MG/DL (ref 0.5–1.4)
DIFFERENTIAL METHOD: ABNORMAL
EOSINOPHIL # BLD AUTO: 0.4 K/UL (ref 0–0.5)
EOSINOPHIL NFR BLD: 4.6 % (ref 0–8)
ERYTHROCYTE [DISTWIDTH] IN BLOOD BY AUTOMATED COUNT: 12.4 % (ref 11.5–14.5)
EST. GFR  (AFRICAN AMERICAN): >60 ML/MIN/1.73 M^2
EST. GFR  (NON AFRICAN AMERICAN): >60 ML/MIN/1.73 M^2
GLUCOSE SERPL-MCNC: 363 MG/DL (ref 70–110)
HCT VFR BLD AUTO: 46.1 % (ref 37–48.5)
HGB BLD-MCNC: 15.3 G/DL (ref 12–16)
IMM GRANULOCYTES # BLD AUTO: 0.06 K/UL (ref 0–0.04)
IMM GRANULOCYTES NFR BLD AUTO: 0.7 % (ref 0–0.5)
INR PPP: 1.1 (ref 0.8–1.2)
LYMPHOCYTES # BLD AUTO: 1.5 K/UL (ref 1–4.8)
LYMPHOCYTES NFR BLD: 16.2 % (ref 18–48)
MCH RBC QN AUTO: 34.5 PG (ref 27–31)
MCHC RBC AUTO-ENTMCNC: 33.2 G/DL (ref 32–36)
MCV RBC AUTO: 104 FL (ref 82–98)
MONOCYTES # BLD AUTO: 0.8 K/UL (ref 0.3–1)
MONOCYTES NFR BLD: 8.5 % (ref 4–15)
NEUTROPHILS # BLD AUTO: 6.2 K/UL (ref 1.8–7.7)
NEUTROPHILS NFR BLD: 69.2 % (ref 38–73)
NRBC BLD-RTO: 0 /100 WBC
PLATELET # BLD AUTO: 149 K/UL (ref 150–450)
PMV BLD AUTO: 10.3 FL (ref 9.2–12.9)
POTASSIUM SERPL-SCNC: 5.4 MMOL/L (ref 3.5–5.1)
PROT SERPL-MCNC: 7.8 G/DL (ref 6–8.4)
PROTHROMBIN TIME: 11.4 SEC (ref 9–12.5)
RBC # BLD AUTO: 4.44 M/UL (ref 4–5.4)
SODIUM SERPL-SCNC: 130 MMOL/L (ref 136–145)
WBC # BLD AUTO: 8.96 K/UL (ref 3.9–12.7)

## 2022-04-29 PROCEDURE — 80321 ALCOHOLS BIOMARKERS 1OR 2: CPT | Performed by: INTERNAL MEDICINE

## 2022-04-29 PROCEDURE — 80053 COMPREHEN METABOLIC PANEL: CPT | Performed by: INTERNAL MEDICINE

## 2022-04-29 PROCEDURE — 36415 COLL VENOUS BLD VENIPUNCTURE: CPT | Mod: PN | Performed by: INTERNAL MEDICINE

## 2022-04-29 PROCEDURE — 85610 PROTHROMBIN TIME: CPT | Performed by: INTERNAL MEDICINE

## 2022-04-29 PROCEDURE — 85025 COMPLETE CBC W/AUTO DIFF WBC: CPT | Performed by: INTERNAL MEDICINE

## 2022-05-03 LAB
PETH 16:0/18.1 (POPETH): 661 NG/ML
PETH 16:0/18.2 (PLPETH): 496 NG/ML

## 2022-06-07 ENCOUNTER — HOSPITAL ENCOUNTER (OUTPATIENT)
Dept: RADIOLOGY | Facility: HOSPITAL | Age: 57
Discharge: HOME | End: 2022-06-07
Attending: INTERNAL MEDICINE
Payer: COMMERCIAL

## 2022-06-07 DIAGNOSIS — K74.60 HEPATIC CIRRHOSIS, UNSPECIFIED HEPATIC CIRRHOSIS TYPE, UNSPECIFIED WHETHER ASCITES PRESENT: ICD-10-CM

## 2022-06-07 PROCEDURE — 76700 US ABDOMEN COMPLETE: ICD-10-PCS | Mod: 26,,, | Performed by: RADIOLOGY

## 2022-06-07 PROCEDURE — 76700 US EXAM ABDOM COMPLETE: CPT | Mod: 26,,, | Performed by: RADIOLOGY

## 2022-06-07 PROCEDURE — 76700 US EXAM ABDOM COMPLETE: CPT | Mod: TC

## 2022-07-19 ENCOUNTER — OFFICE VISIT (OUTPATIENT)
Dept: PRIMARY CARE CLINIC | Facility: CLINIC | Age: 57
End: 2022-07-19
Payer: COMMERCIAL

## 2022-07-19 VITALS
TEMPERATURE: 99 F | HEART RATE: 102 BPM | WEIGHT: 181.44 LBS | OXYGEN SATURATION: 99 % | SYSTOLIC BLOOD PRESSURE: 136 MMHG | BODY MASS INDEX: 30.98 KG/M2 | DIASTOLIC BLOOD PRESSURE: 88 MMHG | HEIGHT: 64 IN | RESPIRATION RATE: 18 BRPM

## 2022-07-19 DIAGNOSIS — E11.42 TYPE 2 DIABETES MELLITUS WITH DIABETIC POLYNEUROPATHY, WITH LONG-TERM CURRENT USE OF INSULIN: Primary | ICD-10-CM

## 2022-07-19 DIAGNOSIS — Z79.4 TYPE 2 DIABETES MELLITUS WITH DIABETIC POLYNEUROPATHY, WITH LONG-TERM CURRENT USE OF INSULIN: Primary | ICD-10-CM

## 2022-07-19 DIAGNOSIS — K31.89 PORTAL HYPERTENSIVE GASTROPATHY: ICD-10-CM

## 2022-07-19 DIAGNOSIS — K70.31 ASCITES DUE TO ALCOHOLIC CIRRHOSIS: ICD-10-CM

## 2022-07-19 DIAGNOSIS — F10.10 ALCOHOL ABUSE: ICD-10-CM

## 2022-07-19 DIAGNOSIS — K70.30 ALCOHOLIC CIRRHOSIS OF LIVER WITHOUT ASCITES: ICD-10-CM

## 2022-07-19 DIAGNOSIS — K76.6 PORTAL HYPERTENSIVE GASTROPATHY: ICD-10-CM

## 2022-07-19 DIAGNOSIS — D69.6 THROMBOCYTOPENIA, UNSPECIFIED: ICD-10-CM

## 2022-07-19 DIAGNOSIS — F33.41 RECURRENT MAJOR DEPRESSIVE DISORDER, IN PARTIAL REMISSION: ICD-10-CM

## 2022-07-19 PROCEDURE — 99999 PR PBB SHADOW E&M-EST. PATIENT-LVL IV: ICD-10-PCS | Mod: PBBFAC,,, | Performed by: INTERNAL MEDICINE

## 2022-07-19 PROCEDURE — 99204 PR OFFICE/OUTPT VISIT, NEW, LEVL IV, 45-59 MIN: ICD-10-PCS | Mod: S$GLB,,, | Performed by: INTERNAL MEDICINE

## 2022-07-19 PROCEDURE — 99999 PR PBB SHADOW E&M-EST. PATIENT-LVL IV: CPT | Mod: PBBFAC,,, | Performed by: INTERNAL MEDICINE

## 2022-07-19 PROCEDURE — 3079F PR MOST RECENT DIASTOLIC BLOOD PRESSURE 80-89 MM HG: ICD-10-PCS | Mod: CPTII,S$GLB,, | Performed by: INTERNAL MEDICINE

## 2022-07-19 PROCEDURE — 3008F PR BODY MASS INDEX (BMI) DOCUMENTED: ICD-10-PCS | Mod: CPTII,S$GLB,, | Performed by: INTERNAL MEDICINE

## 2022-07-19 PROCEDURE — 3008F BODY MASS INDEX DOCD: CPT | Mod: CPTII,S$GLB,, | Performed by: INTERNAL MEDICINE

## 2022-07-19 PROCEDURE — 3075F SYST BP GE 130 - 139MM HG: CPT | Mod: CPTII,S$GLB,, | Performed by: INTERNAL MEDICINE

## 2022-07-19 PROCEDURE — 3079F DIAST BP 80-89 MM HG: CPT | Mod: CPTII,S$GLB,, | Performed by: INTERNAL MEDICINE

## 2022-07-19 PROCEDURE — 99204 OFFICE O/P NEW MOD 45 MIN: CPT | Mod: S$GLB,,, | Performed by: INTERNAL MEDICINE

## 2022-07-19 PROCEDURE — 3075F PR MOST RECENT SYSTOLIC BLOOD PRESS GE 130-139MM HG: ICD-10-PCS | Mod: CPTII,S$GLB,, | Performed by: INTERNAL MEDICINE

## 2022-07-19 RX ORDER — DULOXETIN HYDROCHLORIDE 60 MG/1
60 CAPSULE, DELAYED RELEASE ORAL DAILY
Qty: 90 CAPSULE | Refills: 1 | Status: SHIPPED | OUTPATIENT
Start: 2022-07-19 | End: 2023-02-07 | Stop reason: SDUPTHER

## 2022-07-19 NOTE — PROGRESS NOTES
[facility] Progress Note  7/19/2022          Reason for Visit:      had concerns including Establish Care.       History of Present Illness:     Pt is here today to establish care.    Pt has alcoholic cirrhosis of the liver.  She follows with DR. Serna.  Has portal hypertensive gastropathy, but did not have varicies on most recent EGD per her note.  She does have history of ascites, and takes spironolactone.  Has had paracentesis in the past several times, but not in a long while.  Pt has thrombocytopenia that is likely also related.  Pt says she is still occasionally drinking alcohol.      Pt also has Type II diabetes.  IT has not been very controlled.  She doesn't have a recent a1c on file in our system, and she isn't sure what it was when it was last checked by her previous PCP.  She is prescribed levemir and takes 20 units in the morning and 40 in the evening.  She has never been on any other treatment for diabetes besides the insulin.  With the ESLD, may want to avoid metformin, but pt was agreeable to try ozempic and we will try ordering that today.  We discussed potential side effects.  She gets her eye exams with DR. Zambrano/Bob at [facility] and we will request that report.    She has neuropathy as a complication, and that has been a major complaint for her.  She says it is very painful.  She takes gabapentin - currently on 300 mg, and takes 1 in morning, 1 in afternoon,  And 3 at night.  She says it makes her tired during the day, but she keller sometimes take more than 1 pill during the day when it is hurting worse.  ON foot exam today, she has extensive callus on both 1st digits and over MTP joint, with some fissuring.  Pt is agreeable to see podiatry and we will put in a referral for that.    She takes cymbalta for her mood and says that has been helping it.  She requests a refill on that today.      We reviewed .  Pt is due for MMG.  She refused referral for that.  She says she just  "doesn't want to do it.  She also has not had PAP smear and refused referral to Gyn for same reason- she just doesn't want to do it.  She has also never had colonoscopy and also declined referral for that.          Problem List:     Patient Active Problem List   Diagnosis    Alcoholic cirrhosis of liver    Ascites    Type 2 diabetes mellitus    History of cellulitis    Hyponatremia    Portal hypertensive gastropathy    Anxiety disorder    Depression    Esophageal varices    Portal hypertension    Liver mass    Hypokalemia    Thrombocytopenia, unspecified         Surgical History:     She has a past surgical history that includes Tubal ligation (); Tonsillectomy ();  section (, ); Colonoscopy; Hernia repair (); tummy tuck; Eye surgery; Peritoneocentesis (N/A, 10/21/2019); and Esophagogastroduodenoscopy (N/A, 2020).      Family History:      Her family history includes Glaucoma in her mother; Heart disease in her father; Hypertension in her mother; Prostate cancer in her father.      Social History:     She reports that she has never smoked. She has never used smokeless tobacco. She reports previous alcohol use. She reports previous drug use.      Medications:       Current Outpatient Medications:     cyanocobalamin (VITAMIN B-12) 100 MCG tablet, Take 100 mcg by mouth once daily., Disp: , Rfl:     folic acid (FOLVITE) 1 MG tablet, Take 1 tablet (1 mg total) by mouth once daily., Disp: 30 tablet, Rfl: 9    gabapentin (NEURONTIN) 300 MG capsule, TAKE 1 CAPSULE IN THE MORNING AND 1 CAPSULE AT LUNCH AND 3 CAPSULE(S) AT NIGHT, Disp: 150 capsule, Rfl: 3    multivit with minerals/lutein (MULTIVITAMIN 50 PLUS ORAL), Take by mouth., Disp: , Rfl:     pen needle, diabetic 30 gauge x 1/3" Ndle, NovoFine 30 30 gauge x 1/3" needle, Disp: , Rfl:     spironolactone (ALDACTONE) 100 MG tablet, Take 2 tablets (200 mg total) by mouth once daily., Disp: 60 tablet, Rfl: 11    thiamine 100 " "MG tablet, Take 100 mg by mouth., Disp: , Rfl:     DULoxetine (CYMBALTA) 60 MG capsule, Take 1 capsule (60 mg total) by mouth once daily., Disp: 90 capsule, Rfl: 1    insulin detemir U-100 (LEVEMIR FLEXTOUCH) 100 unit/mL (3 mL) SubQ InPn pen, Inject 20 units int he morning and 40 units in the evening daily, Disp: 12 mL, Rfl: 2    semaglutide (OZEMPIC) 0.25 mg or 0.5 mg(2 mg/1.5 mL) pen injector, Inject 0.25 mg into the skin every 7 days for 28 days, THEN 0.5 mg every 7 days., Disp: 1 pen, Rfl: 5        Allergies:   She is allergic to penicillins.      Review of Systems:     Review of Systems   Constitutional: Negative for chills and fever.   HENT: Negative for ear pain and sore throat.    Respiratory: Negative for cough, shortness of breath and wheezing.    Cardiovascular: Negative for chest pain and palpitations.   Gastrointestinal: Negative for constipation, diarrhea, nausea and vomiting.   Genitourinary: Negative for dysuria and hematuria.   Musculoskeletal: Negative for joint swelling and joint deformity.   Neurological: Negative for dizziness and weakness.           Physical Exam:     Temp:    98.7 °F (37.1 °C) (Oral)        Blood Pressure:  136/88        Pulse:   102     Respirations:  18  Weight:   82.3 kg (181 lb 7 oz)  Height:   5' 4" (1.626 m)  BMI:     Body mass index is 31.14 kg/m².    Physical Exam  Constitutional:       General: She is not in acute distress.  HENT:      Right Ear: Tympanic membrane normal.      Left Ear: Tympanic membrane normal.      Nose: No congestion or rhinorrhea.      Mouth/Throat:      Pharynx: No oropharyngeal exudate or posterior oropharyngeal erythema.   Cardiovascular:      Rate and Rhythm: Normal rate and regular rhythm.      Pulses:           Dorsalis pedis pulses are 2+ on the right side and 2+ on the left side.        Posterior tibial pulses are 2+ on the right side and 2+ on the left side.   Pulmonary:      Effort: Pulmonary effort is normal. No respiratory distress. "      Breath sounds: No wheezing.   Abdominal:      Palpations: Abdomen is soft.      Tenderness: There is no abdominal tenderness.   Musculoskeletal:      Right foot: No deformity or bunion.      Left foot: No deformity or bunion.   Feet:      Right foot:      Protective Sensation: 7 sites tested. 0 sites sensed.      Skin integrity: Callus and fissure present.      Left foot:      Protective Sensation: 7 sites tested. 0 sites sensed.      Skin integrity: Callus and fissure present.   Skin:     General: Skin is warm.   Neurological:      General: No focal deficit present.      Mental Status: She is alert and oriented to person, place, and time.           Labs/Imaging/Testing:     Most recent CBC:  Lab Results   Component Value Date    WBC 8.96 04/29/2022    HGB 15.3 04/29/2022     (L) 04/29/2022     (H) 04/29/2022       Most recent Lipids:  No results found for: CHOL, HDL, LDLCALC, TRIG    Most recent Chemistry:  Lab Results   Component Value Date     (L) 04/29/2022    K 5.4 (H) 04/29/2022    CL 91 (L) 04/29/2022    CO2 28 04/29/2022    BUN 9 04/29/2022    CREATININE 0.8 04/29/2022     (H) 04/29/2022    CALCIUM 10.2 04/29/2022    ALT 34 04/29/2022    AST 35 04/29/2022    ALKPHOS 232 (H) 04/29/2022    PROT 7.8 04/29/2022    ALBUMIN 3.9 04/29/2022       Other tests:  Lab Results   Component Value Date    TSH 2.271 11/26/2019    INR 1.1 04/29/2022    HGBA1C 5.8 (H) 11/26/2019    IRON 46 11/26/2019    FERRITIN 310 (H) 11/26/2019    [id number] 54 11/26/2019    MG 1.8 01/13/2022             Assessment and Plan:     1. Type 2 diabetes mellitus with diabetic polyneuropathy, with long-term current use of insulin  Refer to podiatry - preulcerative callus present, some fissuring  - Ambulatory referral/consult to Podiatry; Future    Repeat labs  - CBC Auto Differential; Future  - Comprehensive Metabolic Panel; Future  - Lipid Panel; Future  - Hemoglobin A1C; Future  - Microalbumin/Creatinine Ratio,  Urine; Future  - TSH; Future    Try adding ozempic- discussed how to titrate  Follow up for any side effects  - semaglutide (OZEMPIC) 0.25 mg or 0.5 mg(2 mg/1.5 mL) pen injector; Inject 0.25 mg into the skin every 7 days for 28 days, THEN 0.5 mg every 7 days.  Dispense: 1 pen; Refill: 5    Eye exam report requested from Dr. Zambrano/Bob    2. Alcoholic cirrhosis of liver without ascites  Encouraged alcohol cessation  Following with Dr. Serna    3. Ascites due to alcoholic cirrhosis  On spironolactone, no recent need for paracentesis    4. Portal hypertensive gastropathy  No varices on most recent EGD per Hepatology note    5. Alcohol abuse  Encouraged alcohol cessation    6. Thrombocytopenia, unspecified  No significant bleeding, likely related to cirrhosis.    7. MDD  Continue cymbalta    RTC 3 mos or sooner santa Sanchez MD  7/19/2022    This note was prepared using voice-recognition software.  Although efforts are made to proofread the note, some errors may persist in the final document.

## 2022-07-20 DIAGNOSIS — Z12.31 OTHER SCREENING MAMMOGRAM: ICD-10-CM

## 2022-07-22 ENCOUNTER — TELEPHONE (OUTPATIENT)
Dept: PRIMARY CARE CLINIC | Facility: CLINIC | Age: 57
End: 2022-07-22
Payer: COMMERCIAL

## 2022-07-22 RX ORDER — INSULIN GLARGINE 300 U/ML
INJECTION, SOLUTION SUBCUTANEOUS
Qty: 4 PEN | Refills: 5 | Status: SHIPPED | OUTPATIENT
Start: 2022-07-22 | End: 2022-12-13 | Stop reason: SDUPTHER

## 2022-07-22 NOTE — TELEPHONE ENCOUNTER
Insurance denied Levemir inj.     Denial reason: pt has not failed or cannot use insulin NPH.     I do not see where pt has used NPH. If this is an error please let me know. So I can appeal it

## 2022-07-22 NOTE — TELEPHONE ENCOUNTER
It looks like [facility] prefers lantus/toujeo.  I changed to toujeo.  Pleasel et me know if still having trouble filling.

## 2022-07-23 ENCOUNTER — PATIENT MESSAGE (OUTPATIENT)
Dept: ADMINISTRATIVE | Facility: HOSPITAL | Age: 57
End: 2022-07-23
Payer: COMMERCIAL

## 2022-07-25 ENCOUNTER — PATIENT MESSAGE (OUTPATIENT)
Dept: ADMINISTRATIVE | Facility: HOSPITAL | Age: 57
End: 2022-07-25
Payer: COMMERCIAL

## 2022-07-27 ENCOUNTER — TELEPHONE (OUTPATIENT)
Dept: PRIMARY CARE CLINIC | Facility: CLINIC | Age: 57
End: 2022-07-27
Payer: COMMERCIAL

## 2022-07-27 NOTE — TELEPHONE ENCOUNTER
----- Message from Sofi Lee sent at 7/27/2022 11:34 AM CDT -----  Contact: Self [phone]  Would like to receive medical advice.    Pharmacy name/number (copy/paste from chart):      [facility] - [address] [address]  Phone: [phone] Fax: [phone]    Would they like a call back or a response via [facility]:  call back    Additional information:  Calling to speak with the nurse regarding questions about insulin glargine, TOUJEO, (TOUJEO SOLOSTAR U-300 INSULIN) 300 unit/mL (1.5 mL) InPn pen. Pt also states she did not receive an insulin that should be used once a week.

## 2022-07-28 LAB
LEFT EYE DM RETINOPATHY: NEGATIVE
RIGHT EYE DM RETINOPATHY: NEGATIVE

## 2022-07-28 NOTE — TELEPHONE ENCOUNTER
Patient states that she went to  her insulin from the pharmacy and they gave her toujeo. Wants to know if she should be taking that medication. Also states that you was supposed to give her something to take once a week but she didn't receive anything.

## 2022-07-29 ENCOUNTER — TELEPHONE (OUTPATIENT)
Dept: PRIMARY CARE CLINIC | Facility: CLINIC | Age: 57
End: 2022-07-29
Payer: COMMERCIAL

## 2022-07-29 NOTE — TELEPHONE ENCOUNTER
The Toujeo was taking the place of levemir, which required a PA with her insurance.  We submitted a PA and it was denied.  The insurance preferred Toujeo.  She can use it with the same number of units she was taking levemir.    I did send a prescription for ozempic to the pharmacy also.  That is the once weekly medicaiton she is referring to.  She could check with the pharmacy for the status of that.

## 2022-08-03 ENCOUNTER — TELEPHONE (OUTPATIENT)
Dept: PRIMARY CARE CLINIC | Facility: CLINIC | Age: 57
End: 2022-08-03
Payer: COMMERCIAL

## 2022-08-05 ENCOUNTER — PATIENT OUTREACH (OUTPATIENT)
Dept: ADMINISTRATIVE | Facility: HOSPITAL | Age: 57
End: 2022-08-05
Payer: COMMERCIAL

## 2022-08-05 NOTE — LETTER
AUTHORIZATION FOR RELEASE OF   CONFIDENTIAL INFORMATION        We are seeing Brunilda Delgadillo, date of birth 1965, in the clinic at [facility]. Vishal Sanchez MD is the patient's PCP. Brunilda Delgadillo has an outstanding lab/procedure at the time we reviewed her chart. In order to help keep her health information updated, she has authorized us to request the following medical record(s):        (  )  MAMMOGRAM                                      (  )  COLONOSCOPY      (  )  PAP SMEAR                                          (  )  OUTSIDE LAB RESULTS     (  )  DEXA SCAN                                          (x  )  EYE EXAM            (  )  FOOT EXAM                                          (  )  ENTIRE RECORD     (  )  OUTSIDE IMMUNIZATIONS                 (  )  _______________         Please fax records to [facility], Joshua E Mizell, MD, [phone]     If you have any questions, please contact Thuy at [phone]           Patient Name: Brunilda Delgadillo  : 1965  Patient Phone #: [phone]

## 2022-08-05 NOTE — LETTER
AUTHORIZATION FOR RELEASE OF   CONFIDENTIAL INFORMATION        We are seeing Brunilda Delgadillo, date of birth 1965, in the clinic at [facility]. Vishal Sanchez MD is the patient's PCP. Brunilda Delgadillo has an outstanding lab/procedure at the time we reviewed her chart. In order to help keep her health information updated, she has authorized us to request the following medical record(s):        (  )  MAMMOGRAM                                      (x  )  COLONOSCOPY      (  )  PAP SMEAR                                          (  )  OUTSIDE LAB RESULTS     (  )  DEXA SCAN                                          (  )  EYE EXAM            (  )  FOOT EXAM                                          (  )  ENTIRE RECORD     (  )  OUTSIDE IMMUNIZATIONS                 (  )  _______________         Please fax records to [facility], Joshua E Mizell, MD, [phone]     If you have any questions, please contact Thuy at [phone]          Patient Name: Brunilda Delgadillo  : 1965  Patient Phone #: [phone]

## 2022-08-10 ENCOUNTER — LAB VISIT (OUTPATIENT)
Dept: LAB | Facility: HOSPITAL | Age: 57
End: 2022-08-10
Attending: INTERNAL MEDICINE
Payer: COMMERCIAL

## 2022-08-10 DIAGNOSIS — Z79.4 TYPE 2 DIABETES MELLITUS WITH DIABETIC POLYNEUROPATHY, WITH LONG-TERM CURRENT USE OF INSULIN: ICD-10-CM

## 2022-08-10 DIAGNOSIS — E11.42 TYPE 2 DIABETES MELLITUS WITH DIABETIC POLYNEUROPATHY, WITH LONG-TERM CURRENT USE OF INSULIN: ICD-10-CM

## 2022-08-10 LAB
ALBUMIN SERPL BCP-MCNC: 4 G/DL (ref 3.5–5.2)
ALP SERPL-CCNC: 195 U/L (ref 55–135)
ALT SERPL W/O P-5'-P-CCNC: 27 U/L (ref 10–44)
ANION GAP SERPL CALC-SCNC: 11 MMOL/L (ref 8–16)
AST SERPL-CCNC: 24 U/L (ref 10–40)
BASOPHILS # BLD AUTO: 0.06 K/UL (ref 0–0.2)
BASOPHILS NFR BLD: 0.9 % (ref 0–1.9)
BILIRUB SERPL-MCNC: 1.4 MG/DL (ref 0.1–1)
BUN SERPL-MCNC: 9 MG/DL (ref 6–20)
CALCIUM SERPL-MCNC: 10.2 MG/DL (ref 8.7–10.5)
CHLORIDE SERPL-SCNC: 97 MMOL/L (ref 95–110)
CHOLEST SERPL-MCNC: 218 MG/DL (ref 120–199)
CHOLEST/HDLC SERPL: 4 {RATIO} (ref 2–5)
CO2 SERPL-SCNC: 28 MMOL/L (ref 23–29)
CREAT SERPL-MCNC: 0.9 MG/DL (ref 0.5–1.4)
DIFFERENTIAL METHOD: ABNORMAL
EOSINOPHIL # BLD AUTO: 0.5 K/UL (ref 0–0.5)
EOSINOPHIL NFR BLD: 6.5 % (ref 0–8)
ERYTHROCYTE [DISTWIDTH] IN BLOOD BY AUTOMATED COUNT: 12.1 % (ref 11.5–14.5)
EST. GFR  (NO RACE VARIABLE): >60 ML/MIN/1.73 M^2
ESTIMATED AVG GLUCOSE: 289 MG/DL (ref 68–131)
GLUCOSE SERPL-MCNC: 221 MG/DL (ref 70–110)
HBA1C MFR BLD: 11.7 % (ref 4–5.6)
HCT VFR BLD AUTO: 46.6 % (ref 37–48.5)
HDLC SERPL-MCNC: 54 MG/DL (ref 40–75)
HDLC SERPL: 24.8 % (ref 20–50)
HGB BLD-MCNC: 15.8 G/DL (ref 12–16)
IMM GRANULOCYTES # BLD AUTO: 0.03 K/UL (ref 0–0.04)
IMM GRANULOCYTES NFR BLD AUTO: 0.4 % (ref 0–0.5)
LDLC SERPL CALC-MCNC: 147.2 MG/DL (ref 63–159)
LYMPHOCYTES # BLD AUTO: 1.8 K/UL (ref 1–4.8)
LYMPHOCYTES NFR BLD: 25.6 % (ref 18–48)
MCH RBC QN AUTO: 34.2 PG (ref 27–31)
MCHC RBC AUTO-ENTMCNC: 33.9 G/DL (ref 32–36)
MCV RBC AUTO: 101 FL (ref 82–98)
MONOCYTES # BLD AUTO: 0.7 K/UL (ref 0.3–1)
MONOCYTES NFR BLD: 9.4 % (ref 4–15)
NEUTROPHILS # BLD AUTO: 4 K/UL (ref 1.8–7.7)
NEUTROPHILS NFR BLD: 57.2 % (ref 38–73)
NONHDLC SERPL-MCNC: 164 MG/DL
NRBC BLD-RTO: 0 /100 WBC
PLATELET # BLD AUTO: 144 K/UL (ref 150–450)
PMV BLD AUTO: 10.2 FL (ref 9.2–12.9)
POTASSIUM SERPL-SCNC: 5.5 MMOL/L (ref 3.5–5.1)
PROT SERPL-MCNC: 7.1 G/DL (ref 6–8.4)
RBC # BLD AUTO: 4.62 M/UL (ref 4–5.4)
SODIUM SERPL-SCNC: 136 MMOL/L (ref 136–145)
T4 FREE SERPL-MCNC: 0.76 NG/DL (ref 0.71–1.51)
TRIGL SERPL-MCNC: 84 MG/DL (ref 30–150)
TSH SERPL DL<=0.005 MIU/L-ACNC: 5.02 UIU/ML (ref 0.4–4)
WBC # BLD AUTO: 7.03 K/UL (ref 3.9–12.7)

## 2022-08-10 PROCEDURE — 83036 HEMOGLOBIN GLYCOSYLATED A1C: CPT | Performed by: INTERNAL MEDICINE

## 2022-08-10 PROCEDURE — 36415 COLL VENOUS BLD VENIPUNCTURE: CPT | Mod: PN | Performed by: INTERNAL MEDICINE

## 2022-08-10 PROCEDURE — 84443 ASSAY THYROID STIM HORMONE: CPT | Performed by: INTERNAL MEDICINE

## 2022-08-10 PROCEDURE — 80061 LIPID PANEL: CPT | Performed by: INTERNAL MEDICINE

## 2022-08-10 PROCEDURE — 84439 ASSAY OF FREE THYROXINE: CPT | Performed by: INTERNAL MEDICINE

## 2022-08-10 PROCEDURE — 85025 COMPLETE CBC W/AUTO DIFF WBC: CPT | Performed by: INTERNAL MEDICINE

## 2022-08-10 PROCEDURE — 80053 COMPREHEN METABOLIC PANEL: CPT | Performed by: INTERNAL MEDICINE

## 2022-08-11 DIAGNOSIS — E87.5 HYPERKALEMIA: Primary | ICD-10-CM

## 2022-08-12 ENCOUNTER — TELEPHONE (OUTPATIENT)
Dept: PRIMARY CARE CLINIC | Facility: CLINIC | Age: 57
End: 2022-08-12
Payer: COMMERCIAL

## 2022-08-12 ENCOUNTER — PATIENT MESSAGE (OUTPATIENT)
Dept: PRIMARY CARE CLINIC | Facility: CLINIC | Age: 57
End: 2022-08-12
Payer: COMMERCIAL

## 2022-08-12 NOTE — TELEPHONE ENCOUNTER
----- Message from Vishal Sanchez MD sent at 8/11/2022  8:17 PM CDT -----  Can you help patient arrange repeat potassium in 1-2 weeks.

## 2022-08-15 ENCOUNTER — OFFICE VISIT (OUTPATIENT)
Dept: PODIATRY | Facility: CLINIC | Age: 57
End: 2022-08-15
Payer: COMMERCIAL

## 2022-08-15 VITALS
DIASTOLIC BLOOD PRESSURE: 93 MMHG | HEART RATE: 114 BPM | BODY MASS INDEX: 30.9 KG/M2 | HEIGHT: 64 IN | WEIGHT: 181 LBS | SYSTOLIC BLOOD PRESSURE: 138 MMHG

## 2022-08-15 DIAGNOSIS — Z79.4 TYPE 2 DIABETES MELLITUS WITH DIABETIC POLYNEUROPATHY, WITH LONG-TERM CURRENT USE OF INSULIN: ICD-10-CM

## 2022-08-15 DIAGNOSIS — E11.42 TYPE 2 DIABETES MELLITUS WITH DIABETIC POLYNEUROPATHY, WITH LONG-TERM CURRENT USE OF INSULIN: ICD-10-CM

## 2022-08-15 DIAGNOSIS — B35.1 ONYCHOMYCOSIS: Primary | ICD-10-CM

## 2022-08-15 DIAGNOSIS — L84 CORN OR CALLUS: ICD-10-CM

## 2022-08-15 DIAGNOSIS — L20.9 ATOPIC DERMATITIS, UNSPECIFIED TYPE: ICD-10-CM

## 2022-08-15 PROCEDURE — 3066F PR DOCUMENTATION OF TREATMENT FOR NEPHROPATHY: ICD-10-PCS | Mod: CPTII,S$GLB,, | Performed by: PODIATRIST

## 2022-08-15 PROCEDURE — 11721 DEBRIDE NAIL 6 OR MORE: CPT | Mod: 59,S$GLB,, | Performed by: PODIATRIST

## 2022-08-15 PROCEDURE — 1160F RVW MEDS BY RX/DR IN RCRD: CPT | Mod: CPTII,S$GLB,, | Performed by: PODIATRIST

## 2022-08-15 PROCEDURE — 11721 ROUTINE FOOT CARE: ICD-10-PCS | Mod: 59,S$GLB,, | Performed by: PODIATRIST

## 2022-08-15 PROCEDURE — 3075F SYST BP GE 130 - 139MM HG: CPT | Mod: CPTII,S$GLB,, | Performed by: PODIATRIST

## 2022-08-15 PROCEDURE — 1159F PR MEDICATION LIST DOCUMENTED IN MEDICAL RECORD: ICD-10-PCS | Mod: CPTII,S$GLB,, | Performed by: PODIATRIST

## 2022-08-15 PROCEDURE — 3046F PR MOST RECENT HEMOGLOBIN A1C LEVEL > 9.0%: ICD-10-PCS | Mod: CPTII,S$GLB,, | Performed by: PODIATRIST

## 2022-08-15 PROCEDURE — 3061F PR NEG MICROALBUMINURIA RESULT DOCUMENTED/REVIEW: ICD-10-PCS | Mod: CPTII,S$GLB,, | Performed by: PODIATRIST

## 2022-08-15 PROCEDURE — 3008F PR BODY MASS INDEX (BMI) DOCUMENTED: ICD-10-PCS | Mod: CPTII,S$GLB,, | Performed by: PODIATRIST

## 2022-08-15 PROCEDURE — 99999 PR PBB SHADOW E&M-EST. PATIENT-LVL V: ICD-10-PCS | Mod: PBBFAC,,, | Performed by: PODIATRIST

## 2022-08-15 PROCEDURE — 3066F NEPHROPATHY DOC TX: CPT | Mod: CPTII,S$GLB,, | Performed by: PODIATRIST

## 2022-08-15 PROCEDURE — 3046F HEMOGLOBIN A1C LEVEL >9.0%: CPT | Mod: CPTII,S$GLB,, | Performed by: PODIATRIST

## 2022-08-15 PROCEDURE — 11056 PARNG/CUTG B9 HYPRKR LES 2-4: CPT | Mod: S$GLB,,, | Performed by: PODIATRIST

## 2022-08-15 PROCEDURE — 3061F NEG MICROALBUMINURIA REV: CPT | Mod: CPTII,S$GLB,, | Performed by: PODIATRIST

## 2022-08-15 PROCEDURE — 99203 PR OFFICE/OUTPT VISIT, NEW, LEVL III, 30-44 MIN: ICD-10-PCS | Mod: 25,S$GLB,, | Performed by: PODIATRIST

## 2022-08-15 PROCEDURE — 11056 ROUTINE FOOT CARE: ICD-10-PCS | Mod: S$GLB,,, | Performed by: PODIATRIST

## 2022-08-15 PROCEDURE — 3075F PR MOST RECENT SYSTOLIC BLOOD PRESS GE 130-139MM HG: ICD-10-PCS | Mod: CPTII,S$GLB,, | Performed by: PODIATRIST

## 2022-08-15 PROCEDURE — 3008F BODY MASS INDEX DOCD: CPT | Mod: CPTII,S$GLB,, | Performed by: PODIATRIST

## 2022-08-15 PROCEDURE — 3080F PR MOST RECENT DIASTOLIC BLOOD PRESSURE >= 90 MM HG: ICD-10-PCS | Mod: CPTII,S$GLB,, | Performed by: PODIATRIST

## 2022-08-15 PROCEDURE — 99999 PR PBB SHADOW E&M-EST. PATIENT-LVL V: CPT | Mod: PBBFAC,,, | Performed by: PODIATRIST

## 2022-08-15 PROCEDURE — 1160F PR REVIEW ALL MEDS BY PRESCRIBER/CLIN PHARMACIST DOCUMENTED: ICD-10-PCS | Mod: CPTII,S$GLB,, | Performed by: PODIATRIST

## 2022-08-15 PROCEDURE — 3080F DIAST BP >= 90 MM HG: CPT | Mod: CPTII,S$GLB,, | Performed by: PODIATRIST

## 2022-08-15 PROCEDURE — 1159F MED LIST DOCD IN RCRD: CPT | Mod: CPTII,S$GLB,, | Performed by: PODIATRIST

## 2022-08-15 PROCEDURE — 99203 OFFICE O/P NEW LOW 30 MIN: CPT | Mod: 25,S$GLB,, | Performed by: PODIATRIST

## 2022-08-15 RX ORDER — CLOTRIMAZOLE AND BETAMETHASONE DIPROPIONATE 10; .64 MG/G; MG/G
CREAM TOPICAL 2 TIMES DAILY
Qty: 45 G | Refills: 3 | Status: SHIPPED | OUTPATIENT
Start: 2022-08-15 | End: 2023-06-06

## 2022-08-15 NOTE — PROGRESS NOTES
Subjective:      Patient ID: Brunilda Delgadillo is a 57 y.o. female.    Chief Complaint: Diabetic Foot Exam (Routine Diabetic Foot Exam)    Brunilda is a 57 y.o. female who presents to the clinic for evaluation and treatment of high risk feet. Brunilda has a past medical history of Anxiety disorder (8/21/2019), Ascites (8/21/2019), Cirrhosis, Depression (8/21/2019), Diabetes mellitus, History of cellulitis (8/21/2019), Hyponatremia (8/21/2019), Portal hypertensive gastropathy (8/21/2019), and Type 2 diabetes mellitus (8/21/2019). The patient's chief complaint is long, thick toenails. This patient has documented high risk feet requiring routine maintenance secondary to diabetes mellitis and those secondary complications of diabetes, as mentioned..    PCP: Vishal Sanchez MD    Date Last Seen by PCP: 07/19/22    Current shoe gear:  Affected Foot: Tennis shoes     Unaffected Foot: Tennis shoes    Hemoglobin A1C   Date Value Ref Range Status   08/10/2022 11.7 (H) 4.0 - 5.6 % Final     Comment:     ADA Screening Guidelines:  5.7-6.4%  Consistent with prediabetes  >or=6.5%  Consistent with diabetes    High levels of fetal hemoglobin interfere with the HbA1C  assay. Heterozygous hemoglobin variants (HbS, HgC, etc)do  not significantly interfere with this assay.   However, presence of multiple variants may affect accuracy.     11/26/2019 5.8 (H) 4.0 - 5.6 % Final     Comment:     ADA Screening Guidelines:  5.7-6.4%  Consistent with prediabetes  >or=6.5%  Consistent with diabetes  High levels of fetal hemoglobin interfere with the HbA1C  assay. Heterozygous hemoglobin variants (HbS, HgC, etc)do  not significantly interfere with this assay.   However, presence of multiple variants may affect accuracy.     10/18/2019 5.3 4.0 - 5.6 % Final     Comment:     ADA Screening Guidelines:  5.7-6.4%  Consistent with prediabetes  >or=6.5%  Consistent with diabetes  High levels of fetal hemoglobin interfere with the HbA1C  assay. Heterozygous  "hemoglobin variants (HbS, HgC, etc)do  not significantly interfere with this assay.   However, presence of multiple variants may affect accuracy.       Vitals:    08/15/22 1449   BP: (!) 138/93   Pulse: (!) 114   Weight: 82.1 kg (181 lb)   Height: 5' 4" (1.626 m)   PainSc: 10-Worst pain ever   PainLoc: Foot      Past Medical History:   Diagnosis Date    Anxiety disorder 2019    Ascites 2019    Cirrhosis     Depression 2019    Diabetes mellitus     History of cellulitis 2019    Hyponatremia 2019    Portal hypertensive gastropathy 2019    On EGD     Type 2 diabetes mellitus 2019       Past Surgical History:   Procedure Laterality Date     SECTION  ,     COLONOSCOPY      ESOPHAGOGASTRODUODENOSCOPY N/A 2020    Procedure: EGD (ESOPHAGOGASTRODUODENOSCOPY);  Surgeon: Ayden Bernal MD;  Location: [address]);  Service: Endoscopy;  Laterality: N/A;  labs prior    EYE SURGERY      cosmetic    HERNIA REPAIR      umbilical    PERITONEOCENTESIS N/A 10/21/2019    Procedure: PARACENTESIS, ABDOMINAL;  Surgeon: Israel Hidalgo MD;  Location: [facility] CATH LAB;  Service: Radiology;  Laterality: N/A;    TONSILLECTOMY  1972    TUBAL LIGATION      tummy tuck         Family History   Problem Relation Age of Onset    Glaucoma Mother     Hypertension Mother     Heart disease Father     Prostate cancer Father        Social History     Socioeconomic History    Marital status:    Occupational History    Occupation: homemaker   Tobacco Use    Smoking status: Never Smoker    Smokeless tobacco: Never Used   Substance and Sexual Activity    Alcohol use: Not Currently    Drug use: Not Currently       Current Outpatient Medications   Medication Sig Dispense Refill    cyanocobalamin (VITAMIN B-12) 100 MCG tablet Take 100 mcg by mouth once daily.      DULoxetine (CYMBALTA) 60 MG capsule Take 1 capsule (60 mg total) by mouth once daily. 90 " "capsule 1    folic acid (FOLVITE) 1 MG tablet Take 1 tablet (1 mg total) by mouth once daily. 30 tablet 9    gabapentin (NEURONTIN) 300 MG capsule TAKE 1 CAPSULE IN THE MORNING AND 1 CAPSULE AT LUNCH AND 3 CAPSULE(S) AT NIGHT 150 capsule 3    insulin detemir U-100 (LEVEMIR FLEXTOUCH) 100 unit/mL (3 mL) SubQ InPn pen Inject 20 units int he morning and 40 units in the evening daily 12 mL 2    insulin glargine, TOUJEO, (TOUJEO SOLOSTAR U-300 INSULIN) 300 unit/mL (1.5 mL) InPn pen Inject 40 Units into the skin every morning AND 20 Units every evening. 4 pen 5    multivit with minerals/lutein (MULTIVITAMIN 50 PLUS ORAL) Take by mouth.      pen needle, diabetic 30 gauge x 1/3" Ndle NovoFine 30 30 gauge x 1/3" needle      semaglutide (OZEMPIC) 0.25 mg or 0.5 mg(2 mg/1.5 mL) pen injector Inject 0.25 mg into the skin every 7 days for 28 days, THEN 0.5 mg every 7 days. 1 pen 5    spironolactone (ALDACTONE) 100 MG tablet TAKE 2 TABLETS (200 MG TOTAL) BY MOUTH ONCE DAILY. 60 tablet 10    thiamine 100 MG tablet Take 100 mg by mouth.      clotrimazole-betamethasone 1-0.05% (LOTRISONE) cream Apply topically 2 (two) times daily. 45 g 3     No current facility-administered medications for this visit.       Review of patient's allergies indicates:   Allergen Reactions    Penicillins      Rash as a child       Review of Systems   Constitutional: Negative for chills, fever and malaise/fatigue.   Cardiovascular: Negative for chest pain, claudication and leg swelling.   Respiratory: Negative for cough and shortness of breath.    Skin: Positive for color change, dry skin and nail changes. Negative for itching.   Musculoskeletal: Negative for back pain, joint pain, muscle cramps and muscle weakness.   Gastrointestinal: Negative for nausea and vomiting.   Neurological: Positive for numbness. Negative for weakness.   Psychiatric/Behavioral: Negative for altered mental status.           Objective:      Physical " Exam  Constitutional:       General: She is not in acute distress.     Appearance: She is obese. She is not ill-appearing.   Cardiovascular:      Pulses:           Dorsalis pedis pulses are 2+ on the right side and 2+ on the left side.        Posterior tibial pulses are 2+ on the right side and 2+ on the left side.      Comments: Mild nonpitting edema to lower extremity bilateral.  Rubor noted on dependency bilateral foot.  No hair growth bilateral lower extremity.  Musculoskeletal:      Left foot: Bunion present.      Comments: Manual muscle strength testing 4/5 to lower extremity bilateral.  No pain range of motion or manual muscle strength testing bilateral lower extremity.    Rectus appearing foot type bilateral.  Range of motion to ankle bilateral appears to be within normal limits.    Mild non track bound hallux valgus with palpable overlying bony prominence medial 1 MTP left foot.   Feet:      Right foot:      Protective Sensation: 10 sites tested. 0 sites sensed.      Left foot:      Protective Sensation: 10 sites tested. 0 sites sensed.   Skin:     General: Skin is warm.      Capillary Refill: Capillary refill takes less than 2 seconds.      Findings: No ecchymosis or erythema.      Nails: There is no clubbing.      Comments: Diffuse dry skin along the plantar foot bilateral raised hyperkeratotic patches at the distal tips of toes with thickened a dry peers to the toenails 1-5 bilateral foot.  There is moderate underlying debris to the hallux nail bilateral.  There is patchy yellow discoloration involving the nails 1-5 mostly to the distal 1/2 of the hallux nail bilateral.   Neurological:      Mental Status: She is alert and oriented to person, place, and time.      Sensory: Sensory deficit present.      Motor: Weakness present.      Comments: Vibratory sensation is intact to foot bilateral.                   Assessment:       Encounter Diagnoses   Name Primary?    Type 2 diabetes mellitus with diabetic  polyneuropathy, with long-term current use of insulin     Onychomycosis Yes    Atopic dermatitis, unspecified type     Corn or callus          Plan:       Brunilda was seen today for diabetic foot exam.    Diagnoses and all orders for this visit:    Onychomycosis  -     Routine Foot Care    Type 2 diabetes mellitus with diabetic polyneuropathy, with long-term current use of insulin  -     Ambulatory referral/consult to Podiatry  -     Routine Foot Care    Atopic dermatitis, unspecified type    Corn or callus  -     Routine Foot Care    Other orders  -     clotrimazole-betamethasone 1-0.05% (LOTRISONE) cream; Apply topically 2 (two) times daily.      I counseled the patient on her conditions, their implications and medical management.    Shoe inspection. Diabetic Foot Education. Patient reminded of the importance of good nutrition and blood sugar control to help prevent podiatric complications of diabetes. Patient instructed on proper foot hygeine. We discussed wearing proper shoe gear, daily foot inspections, never walking without protective shoe gear, never putting sharp instruments to feet.    Routine foot care per attached note. She will continue to monitor the areas daily, inspect her feet, wear protective shoe gear when ambulatory, moisturizer to maintain skin integrity.    From a clinical perspective appears as though patient may have a combination of tinea pedis or atopic dermatitis to the right forefoot.  We discussed using lower his own cream twice daily for next 6-12 weeks.    Comprehensive annual diabetic foot exam completed today.  Patient found to be intermediate risk for diabetic foot complication.    RTC within 6 months or p.r.n. as discussed    A portion of this note was generated by voice recognition software and may contain spelling and grammar errors.

## 2022-08-24 ENCOUNTER — PATIENT MESSAGE (OUTPATIENT)
Dept: ADMINISTRATIVE | Facility: HOSPITAL | Age: 57
End: 2022-08-24
Payer: COMMERCIAL

## 2022-08-30 ENCOUNTER — LAB VISIT (OUTPATIENT)
Dept: LAB | Facility: HOSPITAL | Age: 57
End: 2022-08-30
Attending: INTERNAL MEDICINE
Payer: COMMERCIAL

## 2022-08-30 DIAGNOSIS — E87.5 HYPERKALEMIA: ICD-10-CM

## 2022-08-30 LAB — POTASSIUM SERPL-SCNC: 4.5 MMOL/L (ref 3.5–5.1)

## 2022-08-30 PROCEDURE — 84132 ASSAY OF SERUM POTASSIUM: CPT | Performed by: INTERNAL MEDICINE

## 2022-08-30 PROCEDURE — 36415 COLL VENOUS BLD VENIPUNCTURE: CPT | Mod: PN | Performed by: INTERNAL MEDICINE

## 2022-10-12 ENCOUNTER — PATIENT MESSAGE (OUTPATIENT)
Dept: ADMINISTRATIVE | Facility: HOSPITAL | Age: 57
End: 2022-10-12
Payer: COMMERCIAL

## 2022-10-31 DIAGNOSIS — G62.9 NEUROPATHY: ICD-10-CM

## 2022-10-31 RX ORDER — GABAPENTIN 300 MG/1
CAPSULE ORAL
Qty: 150 CAPSULE | Refills: 3 | Status: SHIPPED | OUTPATIENT
Start: 2022-10-31 | End: 2023-02-23 | Stop reason: SDUPTHER

## 2022-11-07 ENCOUNTER — PATIENT OUTREACH (OUTPATIENT)
Dept: ADMINISTRATIVE | Facility: HOSPITAL | Age: 57
End: 2022-11-07
Payer: COMMERCIAL

## 2022-12-01 DIAGNOSIS — E11.9 TYPE 2 DIABETES MELLITUS WITHOUT COMPLICATION: ICD-10-CM

## 2022-12-08 ENCOUNTER — PATIENT MESSAGE (OUTPATIENT)
Dept: ADMINISTRATIVE | Facility: HOSPITAL | Age: 57
End: 2022-12-08
Payer: COMMERCIAL

## 2022-12-13 RX ORDER — INSULIN GLARGINE 300 U/ML
INJECTION, SOLUTION SUBCUTANEOUS
Qty: 4 PEN | Refills: 5 | Status: SHIPPED | OUTPATIENT
Start: 2022-12-13 | End: 2023-05-30 | Stop reason: SDUPTHER

## 2022-12-13 NOTE — TELEPHONE ENCOUNTER
----- Message from Hilda Avila sent at 12/13/2022  3:29 PM CST -----  Contact: Patient  Type:  RX Refill Request    Who Called: Patient   Refill or New Rx:Refill  RX Name and Strength:insulin glargine, TOUJEO, (TOUJEO SOLOSTAR U-300 INSULIN) 300 unit/mL (1.5 mL) InPn pen  Preferred Pharmacy with phone number:[address]   Phone:  [phone]  Fax:  [phone]  Would the patient rather a call back or a response via [facility]? Call back  Best Call Back Number:[phone]  Additional Information:     Patient stated has 1 pen left    Please assist

## 2023-02-07 DIAGNOSIS — F33.41 RECURRENT MAJOR DEPRESSIVE DISORDER, IN PARTIAL REMISSION: Primary | ICD-10-CM

## 2023-02-07 RX ORDER — DULOXETIN HYDROCHLORIDE 60 MG/1
60 CAPSULE, DELAYED RELEASE ORAL DAILY
Qty: 90 CAPSULE | Refills: 1 | Status: SHIPPED | OUTPATIENT
Start: 2023-02-07 | End: 2023-10-19 | Stop reason: SDUPTHER

## 2023-02-07 NOTE — TELEPHONE ENCOUNTER
No new care gaps identified.  [facility] Embedded Care Gaps. Reference number: [id number]. 2/07/2023   2:54:59 PM CST

## 2023-02-08 ENCOUNTER — PATIENT MESSAGE (OUTPATIENT)
Dept: ADMINISTRATIVE | Facility: HOSPITAL | Age: 58
End: 2023-02-08
Payer: COMMERCIAL

## 2023-02-23 DIAGNOSIS — G62.9 NEUROPATHY: ICD-10-CM

## 2023-02-23 DIAGNOSIS — E11.42 TYPE 2 DIABETES MELLITUS WITH DIABETIC POLYNEUROPATHY, WITH LONG-TERM CURRENT USE OF INSULIN: Primary | ICD-10-CM

## 2023-02-23 DIAGNOSIS — Z79.4 TYPE 2 DIABETES MELLITUS WITH DIABETIC POLYNEUROPATHY, WITH LONG-TERM CURRENT USE OF INSULIN: Primary | ICD-10-CM

## 2023-02-23 RX ORDER — GABAPENTIN 300 MG/1
CAPSULE ORAL
Qty: 150 CAPSULE | Refills: 3 | Status: SHIPPED | OUTPATIENT
Start: 2023-02-23 | End: 2023-08-09 | Stop reason: SDUPTHER

## 2023-02-23 NOTE — TELEPHONE ENCOUNTER
No new care gaps identified.  [facility] Embedded Care Gaps. Reference number: [id number]. 2/23/2023   3:10:26 PM CST

## 2023-02-23 NOTE — TELEPHONE ENCOUNTER
"----- Message from [place] sent at 2/23/2023  1:12 PM CST -----  Regarding: Patient advice  Contact: Pt  Pt called in regards to getting refills      pen needle, diabetic 30 gauge x 1/3" Ndle     --  Sig: NovoFine 30 30 gauge x 1/3" needle  Class: Historical Med  Order: [id number]  Date/Time Signed: 10/18/2019 13:23        gabapentin (NEURONTIN) 300 MG capsule 150 capsule 3 10/31/2022  No  Sig: Take 1 capsule at breakfast, 1 capsule at lunch, and 3 capsules at bedtime.  Sent to pharmacy as: gabapentin (NEURONTIN) 300 MG capsule  Class: Normal  Notes to Pharmacy: 05/25/2022 1:02:29 PM  Order: [id number]  Date/Time Signed: 10/31/2022 13:36      E-Prescribing Status: Receipt confirmed by pharmacy (10/31/2022  1:39 PM CDT)        [address]  [phone]    "

## 2023-04-03 ENCOUNTER — PATIENT MESSAGE (OUTPATIENT)
Dept: ADMINISTRATIVE | Facility: HOSPITAL | Age: 58
End: 2023-04-03
Payer: COMMERCIAL

## 2023-05-10 ENCOUNTER — PATIENT MESSAGE (OUTPATIENT)
Dept: ADMINISTRATIVE | Facility: HOSPITAL | Age: 58
End: 2023-05-10
Payer: COMMERCIAL

## 2023-05-26 DIAGNOSIS — Z79.4 TYPE 2 DIABETES MELLITUS WITH DIABETIC POLYNEUROPATHY, WITH LONG-TERM CURRENT USE OF INSULIN: Primary | ICD-10-CM

## 2023-05-26 DIAGNOSIS — E11.42 TYPE 2 DIABETES MELLITUS WITH DIABETIC POLYNEUROPATHY, WITH LONG-TERM CURRENT USE OF INSULIN: Primary | ICD-10-CM

## 2023-05-26 NOTE — TELEPHONE ENCOUNTER
----- Message from Gila Lejordan sent at 5/26/2023 12:57 PM CDT -----  Regarding: Medication refill  Contact: [phone]  Patient Brunilda is calling. Patient would like to get a refill on her flex pen insulin. Patient stated the pharm has requested but haven't received a call back., Patient will be out of meds over the weekend. Please call patient at [phone]    [address]   Phone:  [phone]  Fax:  [phone]

## 2023-05-26 NOTE — TELEPHONE ENCOUNTER
No care due was identified.  [facility] Embedded Care Due Messages. Reference number: [id number].   5/26/2023 2:13:11 PM CDT

## 2023-05-30 RX ORDER — INSULIN GLARGINE 300 U/ML
INJECTION, SOLUTION SUBCUTANEOUS
Qty: 18 ML | Refills: 0 | Status: SHIPPED | OUTPATIENT
Start: 2023-05-30 | End: 2023-06-06 | Stop reason: SDUPTHER

## 2023-05-30 NOTE — TELEPHONE ENCOUNTER
----- Message from Bert Martinez sent at 5/30/2023  3:56 PM CDT -----  Type:  Patient Returning Call    Who Called:pt  Who Left Message for Patient:  Does the patient know what this is regarding?:rx  Would the patient rather a call back or a response via [facility]? Call  Best Call Back Number:[phone]  Additional Information: pt states that the new insulin that was sent into her pharmacy is not covered under her insurance and would like her old insulin to be re sent in insulin glargine, TOUJEO, (TOUJEO SOLOSTAR U-300 INSULIN) 300 unit/mL (1.5 mL) InPn pen

## 2023-05-30 NOTE — TELEPHONE ENCOUNTER
No care due was identified.  [facility] Embedded Care Due Messages. Reference number: [id number].   5/30/2023 4:20:31 PM CDT

## 2023-05-31 ENCOUNTER — TELEPHONE (OUTPATIENT)
Dept: PRIMARY CARE CLINIC | Facility: CLINIC | Age: 58
End: 2023-05-31
Payer: COMMERCIAL

## 2023-05-31 NOTE — TELEPHONE ENCOUNTER
----- Message from Demarcus Tai sent at 5/31/2023  1:19 PM CDT -----  Name Of Caller: Brunilda        Provider Name: Vishal Sanchez        Does patient feel the need to be seen today? no        Relationship to the Pt?: patient        Contact Preference?: [phone]        What is the nature of the call?: Patient states that she needs to speak with someone in the office to get clarification on which insulin was called into [facility] to be refilled

## 2023-05-31 NOTE — TELEPHONE ENCOUNTER
Outpatient Medication Detail     Disp Refills Start End STACY   insulin glargine, TOUJEO, (TOUJEO SOLOSTAR U-300 INSULIN) 300 unit/mL (1.5 mL) InPn pen 18 mL 0 5/30/2023 8/28/2023 No   Sig - Route: Inject 40 Units into the skin every morning AND 20 Units every evening. - Subcutaneous   Sent to pharmacy as: insulin glargine, TOUJEO, (TOUJEO SOLOSTAR U-300 INSULIN) 300 unit/mL (1.5 mL) InPn pen   Class: Normal   Order: [id number]   Date/Time Signed: 5/30/2023 17:25       E-Prescribing Status: Receipt confirmed by pharmacy (5/30/2023  5:28 PM CDT)

## 2023-05-31 NOTE — TELEPHONE ENCOUNTER
Spoke with pharm and they said pt has been on this med, out of stock at the moment they will have it either today or tomorrow for patient to

## 2023-05-31 NOTE — TELEPHONE ENCOUNTER
----- Message from Ximena Rordiguez MA sent at 5/31/2023  3:05 PM CDT -----  Contact: Brunilda @ [phone]  The patient calling to speak with staff about her insulin. States she really need it. Please give the patient a call back at [phone].

## 2023-05-31 NOTE — TELEPHONE ENCOUNTER
[facility] Staff  Caller: Brunilda @ [phone] (Today,  3:05 PM)  The patient calling to speak with staff about her insulin. States she really need it. Please give the patient a call back at [phone].

## 2023-06-04 PROBLEM — F33.41 RECURRENT MAJOR DEPRESSIVE DISORDER, IN PARTIAL REMISSION: Status: ACTIVE | Noted: 2023-06-04

## 2023-06-04 NOTE — PROGRESS NOTES
[facility] Progress Note  6/6/2023          Reason for Visit:      had concerns including Follow-up and Diabetes.       History of Present Illness:     Pt is here today for follow up.  We last saw her 7/19/22    Type II Diabetes  Toujeo 40 in AM and 20 in PM  Tried starting ozempic last visit - she never got rx and doesn't know what happened.  She says pharmacy never filled it.  We were unaware she had issues getting it.  A1c was 11.7 on 8/10/22  She has never been on any other treatment for diabetes besides the insulin.   She sees Eye doctor on [facility]- says she is up to date on eye exam.  Requesting report     - with neuropathy - Gabapentin 300 (1 in am, 1 in afternoon, 3 at night).  She says it makes her tired during the day, but she does sometimes take more than 1 pill during the day when it is hurting worse.  She says she is having severe pain in both feet and the gabapentin is not adequately alleviating it.  Also on cymbalta, doesn't find it very helpful for the pain.  She asks for referral to pain management.  At last visit, she had extensive callus on both 1st digits and over MTP joint, with some fissuring. Referred to podiatry after last viist here.  Last saw Dr. Aguirre on 8/15/22.  She did not feel she got anything out of that visit and refuses to go back to podiatry at this time.         Alcoholic cirrhosis  Pt has alcoholic cirrhosis of the liver.  She follows with DR. Serna.  Has portal hypertensive gastropathy, but did not have varicies on most recent EGD per her note.  She does have history of ascites, and takes spironolactone.  Has had paracentesis in the past several times, but not in a long while.  Pt has thrombocytopenia that is likely also related.  Pt says she is still occasionally drinking alcohol.    - with portal Hypertensive gastropathy  - with Thrombocytopenia  - with Ascites- spironolactone 200 daily       MDD  Cymbalta 60 daily  She takes  "cymbalta for her mood and says that has been helping it, though she says the chronic pain has her feeling depressed.       HM  Pt refuses MMG and Cervical cancer screening.    She is due for colonoscopy and we put in order for that (sees Dr. Silviano Garcia at [facility])      Problem List:     Patient Active Problem List   Diagnosis    Alcoholic cirrhosis of liver    Ascites    Type 2 diabetes mellitus with diabetic polyneuropathy, with long-term current use of insulin    History of cellulitis    Hyponatremia    Portal hypertensive gastropathy    Anxiety disorder    Depression    Esophageal varices    Portal hypertension    Liver mass    Hypokalemia    Thrombocytopenia, unspecified    Recurrent major depressive disorder, in partial remission         Medications:       Current Outpatient Medications:     cyanocobalamin (VITAMIN B-12) 100 MCG tablet, Take 100 mcg by mouth once daily., Disp: , Rfl:     DULoxetine (CYMBALTA) 60 MG capsule, Take 1 capsule (60 mg total) by mouth once daily., Disp: 90 capsule, Rfl: 1    folic acid (FOLVITE) 1 MG tablet, TAKE 1 TABLET BY MOUTH ONCE DAILY., Disp: 30 tablet, Rfl: 8    gabapentin (NEURONTIN) 300 MG capsule, Take 1 capsule at breakfast, 1 capsule at lunch, and 3 capsules at bedtime., Disp: 150 capsule, Rfl: 3    multivit with minerals/lutein (MULTIVITAMIN 50 PLUS ORAL), Take by mouth., Disp: , Rfl:     pen needle, diabetic 30 gauge x 1/3" Ndle, NovoFine 30 30 gauge x 1/3" needle, Disp: 50 each, Rfl: 3    spironolactone (ALDACTONE) 100 MG tablet, TAKE 2 TABLETS (200 MG TOTAL) BY MOUTH ONCE DAILY., Disp: 60 tablet, Rfl: 10    thiamine 100 MG tablet, Take 100 mg by mouth., Disp: , Rfl:     clotrimazole-betamethasone 1-0.05% (LOTRISONE) cream, Apply topically 2 (two) times daily. (Patient not taking: Reported on 6/6/2023), Disp: 45 g, Rfl: 3    insulin glargine, TOUJEO, (TOUJEO SOLOSTAR U-300 INSULIN) 300 unit/mL (1.5 mL) InPn pen, Inject 40 Units into the skin every morning AND 20 " "Units every evening., Disp: 18 mL, Rfl: 5    tirzepatide (MOUNJARO) 2.5 mg/0.5 mL PnIj, Inject 2.5 mg into the skin every 7 days., Disp: 4 pen, Rfl: 0    tirzepatide (MOUNJARO) 5 mg/0.5 mL PnIj, Inject 5 mg into the skin every 7 days., Disp: 4 pen, Rfl: 0    tirzepatide 7.5 mg/0.5 mL PnIj, Inject 7.5 mg into the skin every 7 days., Disp: 4 pen, Rfl: 3        Review of Systems:     Review of Systems   Constitutional:  Negative for chills and fever.   HENT:  Negative for ear pain and sore throat.    Respiratory:  Negative for cough, shortness of breath and wheezing.    Cardiovascular:  Negative for chest pain and palpitations.   Gastrointestinal:  Negative for constipation, diarrhea, nausea and vomiting.   Genitourinary:  Negative for dysuria and hematuria.   Musculoskeletal:  Negative for joint swelling and joint deformity.   Neurological:  Negative for dizziness and weakness.         Physical Exam:     Temp:    98.7 °F (37.1 °C)        Blood Pressure:  124/76        Pulse:   95     Respirations:  18  Weight:   83.3 kg (183 lb 10.3 oz)  Height:   5' 4" (1.626 m)  BMI:     Body mass index is 31.52 kg/m².    Physical Exam  Constitutional:       General: She is not in acute distress.  HENT:      Nose: No congestion or rhinorrhea.      Mouth/Throat:      Pharynx: No oropharyngeal exudate or posterior oropharyngeal erythema.   Cardiovascular:      Rate and Rhythm: Normal rate and regular rhythm.   Pulmonary:      Effort: Pulmonary effort is normal. No respiratory distress.      Breath sounds: No wheezing.   Abdominal:      Palpations: Abdomen is soft.      Tenderness: There is no abdominal tenderness.   Skin:     General: Skin is warm.   Neurological:      General: No focal deficit present.      Mental Status: She is alert and oriented to person, place, and time.         Labs/Imaging/Testing:     Most recent CBC:  Lab Results   Component Value Date    WBC 7.03 08/10/2022    HGB 15.8 08/10/2022     (L) 08/10/2022    "  (H) 08/10/2022       Most recent Lipids:  Lab Results   Component Value Date    CHOL 218 (H) 08/10/2022    HDL 54 08/10/2022    LDLCALC 147.2 08/10/2022    TRIG 84 08/10/2022       Most recent Chemistry:  Lab Results   Component Value Date     08/10/2022    K 4.5 08/30/2022    CL 97 08/10/2022    CO2 28 08/10/2022    BUN 9 08/10/2022    CREATININE 0.9 08/10/2022     (H) 08/10/2022    CALCIUM 10.2 08/10/2022    ALT 27 08/10/2022    AST 24 08/10/2022    ALKPHOS 195 (H) 08/10/2022    PROT 7.1 08/10/2022    ALBUMIN 4.0 08/10/2022       Other tests:  Lab Results   Component Value Date    TSH 5.018 (H) 08/10/2022    FREET4 0.76 08/10/2022    INR 1.1 04/29/2022    HGBA1C 11.7 (H) 08/10/2022    IRON 46 11/26/2019    FERRITIN 310 (H) 11/26/2019    VARYYOHD17QX 54 11/26/2019    MG 1.8 01/13/2022             Assessment and Plan:     1. Type 2 diabetes mellitus with diabetic polyneuropathy, with long-term current use of insulin  Will recheck labs  Pt declined referral to endocrine  She was off of insulin for 5 days because of trouble filling it - we had levemir in med list at last year's visit but she says she had not been on that in a long time - is actually on Toujeo.  She started back on it a few days ago.  Will plan to get labs later this week.  Try adding Mounjaro - start 2.5 mg and titrate to 5 mg the next month and 7.5 the month after.  Advised patient to contact us if she has trouble filling.  She is not on metformin because it is contraindicated with her liver disease (should PA be necessary please provide this info)    - CBC Auto Differential; Future  - Comprehensive Metabolic Panel; Future  - Lipid Panel; Future  - Hemoglobin A1C; Future  - TSH; Future  - Microalbumin/Creatinine Ratio, Urine; Future    - tirzepatide (MOUNJARO) 2.5 mg/0.5 mL PnIj; Inject 2.5 mg into the skin every 7 days.  Dispense: 4 pen; Refill: 0  - tirzepatide (MOUNJARO) 5 mg/0.5 mL PnIj; Inject 5 mg into the skin every 7  days.  Dispense: 4 pen; Refill: 0  - tirzepatide 7.5 mg/0.5 mL PnIj; Inject 7.5 mg into the skin every 7 days.  Dispense: 4 pen; Refill: 3    2. Recurrent major depressive disorder, in partial remission  Continue cymbalta    3. Portal hypertensive gastropathy  Seeing Hepatology    4. Thrombocytopenia, unspecified  Recheck CBC    5. Alcoholic cirrhosis of liver without ascites  Seeing Hepatology, encouraged alcohol cessation    6. Neuropathic pain  - Ambulatory referral/consult to Pain Clinic; Future  Continue gabapentin and refer to pain management for recommendations    RTC 3 mos or sooner santa Sanchez MD  6/6/2023    This note was prepared using voice-recognition software.  Although efforts are made to proofread the note, some errors may persist in the final document.

## 2023-06-06 ENCOUNTER — OFFICE VISIT (OUTPATIENT)
Dept: PRIMARY CARE CLINIC | Facility: CLINIC | Age: 58
End: 2023-06-06
Payer: COMMERCIAL

## 2023-06-06 VITALS
TEMPERATURE: 99 F | BODY MASS INDEX: 31.35 KG/M2 | WEIGHT: 183.63 LBS | HEIGHT: 64 IN | DIASTOLIC BLOOD PRESSURE: 76 MMHG | SYSTOLIC BLOOD PRESSURE: 124 MMHG | RESPIRATION RATE: 18 BRPM | HEART RATE: 95 BPM | OXYGEN SATURATION: 98 %

## 2023-06-06 DIAGNOSIS — F33.41 RECURRENT MAJOR DEPRESSIVE DISORDER, IN PARTIAL REMISSION: Primary | ICD-10-CM

## 2023-06-06 DIAGNOSIS — K76.6 PORTAL HYPERTENSIVE GASTROPATHY: ICD-10-CM

## 2023-06-06 DIAGNOSIS — Z12.11 COLON CANCER SCREENING: ICD-10-CM

## 2023-06-06 DIAGNOSIS — M79.2 NEUROPATHIC PAIN: ICD-10-CM

## 2023-06-06 DIAGNOSIS — K70.30 ALCOHOLIC CIRRHOSIS OF LIVER WITHOUT ASCITES: ICD-10-CM

## 2023-06-06 DIAGNOSIS — E11.42 TYPE 2 DIABETES MELLITUS WITH DIABETIC POLYNEUROPATHY, WITH LONG-TERM CURRENT USE OF INSULIN: ICD-10-CM

## 2023-06-06 DIAGNOSIS — Z79.4 TYPE 2 DIABETES MELLITUS WITH DIABETIC POLYNEUROPATHY, WITH LONG-TERM CURRENT USE OF INSULIN: ICD-10-CM

## 2023-06-06 DIAGNOSIS — D69.6 THROMBOCYTOPENIA, UNSPECIFIED: ICD-10-CM

## 2023-06-06 DIAGNOSIS — K31.89 PORTAL HYPERTENSIVE GASTROPATHY: ICD-10-CM

## 2023-06-06 PROCEDURE — 99999 PR PBB SHADOW E&M-EST. PATIENT-LVL V: CPT | Mod: PBBFAC,,, | Performed by: INTERNAL MEDICINE

## 2023-06-06 PROCEDURE — 99999 PR PBB SHADOW E&M-EST. PATIENT-LVL V: ICD-10-PCS | Mod: PBBFAC,,, | Performed by: INTERNAL MEDICINE

## 2023-06-06 PROCEDURE — 1159F MED LIST DOCD IN RCRD: CPT | Mod: CPTII,S$GLB,, | Performed by: INTERNAL MEDICINE

## 2023-06-06 PROCEDURE — 1159F PR MEDICATION LIST DOCUMENTED IN MEDICAL RECORD: ICD-10-PCS | Mod: CPTII,S$GLB,, | Performed by: INTERNAL MEDICINE

## 2023-06-06 PROCEDURE — 99214 PR OFFICE/OUTPT VISIT, EST, LEVL IV, 30-39 MIN: ICD-10-PCS | Mod: S$GLB,,, | Performed by: INTERNAL MEDICINE

## 2023-06-06 PROCEDURE — 3078F DIAST BP <80 MM HG: CPT | Mod: CPTII,S$GLB,, | Performed by: INTERNAL MEDICINE

## 2023-06-06 PROCEDURE — 99214 OFFICE O/P EST MOD 30 MIN: CPT | Mod: S$GLB,,, | Performed by: INTERNAL MEDICINE

## 2023-06-06 PROCEDURE — 3074F PR MOST RECENT SYSTOLIC BLOOD PRESSURE < 130 MM HG: ICD-10-PCS | Mod: CPTII,S$GLB,, | Performed by: INTERNAL MEDICINE

## 2023-06-06 PROCEDURE — 3078F PR MOST RECENT DIASTOLIC BLOOD PRESSURE < 80 MM HG: ICD-10-PCS | Mod: CPTII,S$GLB,, | Performed by: INTERNAL MEDICINE

## 2023-06-06 PROCEDURE — 3074F SYST BP LT 130 MM HG: CPT | Mod: CPTII,S$GLB,, | Performed by: INTERNAL MEDICINE

## 2023-06-06 PROCEDURE — 3008F PR BODY MASS INDEX (BMI) DOCUMENTED: ICD-10-PCS | Mod: CPTII,S$GLB,, | Performed by: INTERNAL MEDICINE

## 2023-06-06 PROCEDURE — 3008F BODY MASS INDEX DOCD: CPT | Mod: CPTII,S$GLB,, | Performed by: INTERNAL MEDICINE

## 2023-06-06 RX ORDER — TIRZEPATIDE 5 MG/.5ML
5 INJECTION, SOLUTION SUBCUTANEOUS
Qty: 4 PEN | Refills: 0 | Status: SHIPPED | OUTPATIENT
Start: 2023-06-06 | End: 2023-11-09 | Stop reason: SDUPTHER

## 2023-06-06 RX ORDER — TIRZEPATIDE 2.5 MG/.5ML
2.5 INJECTION, SOLUTION SUBCUTANEOUS
Qty: 4 PEN | Refills: 0 | Status: SHIPPED | OUTPATIENT
Start: 2023-06-06 | End: 2023-10-28

## 2023-06-06 RX ORDER — INSULIN GLARGINE 300 U/ML
INJECTION, SOLUTION SUBCUTANEOUS
Qty: 18 ML | Refills: 5 | Status: SHIPPED | OUTPATIENT
Start: 2023-06-06 | End: 2023-11-06 | Stop reason: SDUPTHER

## 2023-06-07 ENCOUNTER — PATIENT OUTREACH (OUTPATIENT)
Dept: ADMINISTRATIVE | Facility: HOSPITAL | Age: 58
End: 2023-06-07
Payer: COMMERCIAL

## 2023-06-07 NOTE — LETTER
AUTHORIZATION FOR RELEASE OF   CONFIDENTIAL INFORMATION        We are seeing Brunilda Delgadillo, date of birth 1965, in the clinic at [facility]. Vishal Sanchez MD is the patient's PCP. Brunilda Delgadillo has an outstanding lab/procedure at the time we reviewed her chart. In order to help keep her health information updated, she has authorized us to request the following medical record(s):        (  )  MAMMOGRAM                                      (  )  COLONOSCOPY      (  )  PAP SMEAR                                          (  )  OUTSIDE LAB RESULTS     (  )  DEXA SCAN                                          ( xx )  EYE EXAM         z   (  )  FOOT EXAM                                          (  )  ENTIRE RECORD     (  )  OUTSIDE IMMUNIZATIONS                 (  )  _______________         Please fax records to [facility], Joshua E Mizell, MD, [phone]     If you have any questions, please contact Thuy at [phone].           Patient Name: Brunilda Delgadillo  : 1965  Patient Phone #: [phone]

## 2023-06-15 ENCOUNTER — LAB VISIT (OUTPATIENT)
Dept: LAB | Facility: HOSPITAL | Age: 58
End: 2023-06-15
Attending: INTERNAL MEDICINE
Payer: COMMERCIAL

## 2023-06-15 DIAGNOSIS — E11.42 TYPE 2 DIABETES MELLITUS WITH DIABETIC POLYNEUROPATHY, WITH LONG-TERM CURRENT USE OF INSULIN: ICD-10-CM

## 2023-06-15 DIAGNOSIS — Z79.4 TYPE 2 DIABETES MELLITUS WITH DIABETIC POLYNEUROPATHY, WITH LONG-TERM CURRENT USE OF INSULIN: ICD-10-CM

## 2023-06-15 LAB
ALBUMIN SERPL BCP-MCNC: 4 G/DL (ref 3.5–5.2)
ALP SERPL-CCNC: 225 U/L (ref 55–135)
ALT SERPL W/O P-5'-P-CCNC: 31 U/L (ref 10–44)
ANION GAP SERPL CALC-SCNC: 12 MMOL/L (ref 8–16)
AST SERPL-CCNC: 31 U/L (ref 10–40)
BASOPHILS # BLD AUTO: 0.06 K/UL (ref 0–0.2)
BASOPHILS NFR BLD: 0.7 % (ref 0–1.9)
BILIRUB SERPL-MCNC: 1.8 MG/DL (ref 0.1–1)
BUN SERPL-MCNC: 16 MG/DL (ref 6–20)
CALCIUM SERPL-MCNC: 10.4 MG/DL (ref 8.7–10.5)
CHLORIDE SERPL-SCNC: 94 MMOL/L (ref 95–110)
CHOLEST SERPL-MCNC: 243 MG/DL (ref 120–199)
CHOLEST/HDLC SERPL: 3.9 {RATIO} (ref 2–5)
CO2 SERPL-SCNC: 27 MMOL/L (ref 23–29)
CREAT SERPL-MCNC: 0.9 MG/DL (ref 0.5–1.4)
DIFFERENTIAL METHOD: ABNORMAL
EOSINOPHIL # BLD AUTO: 0.4 K/UL (ref 0–0.5)
EOSINOPHIL NFR BLD: 4.6 % (ref 0–8)
ERYTHROCYTE [DISTWIDTH] IN BLOOD BY AUTOMATED COUNT: 12.2 % (ref 11.5–14.5)
EST. GFR  (NO RACE VARIABLE): >60 ML/MIN/1.73 M^2
ESTIMATED AVG GLUCOSE: 260 MG/DL (ref 68–131)
GLUCOSE SERPL-MCNC: 332 MG/DL (ref 70–110)
HBA1C MFR BLD: 10.7 % (ref 4–5.6)
HCT VFR BLD AUTO: 47.3 % (ref 37–48.5)
HDLC SERPL-MCNC: 62 MG/DL (ref 40–75)
HDLC SERPL: 25.5 % (ref 20–50)
HGB BLD-MCNC: 15.5 G/DL (ref 12–16)
IMM GRANULOCYTES # BLD AUTO: 0.05 K/UL (ref 0–0.04)
IMM GRANULOCYTES NFR BLD AUTO: 0.6 % (ref 0–0.5)
LDLC SERPL CALC-MCNC: 163.2 MG/DL (ref 63–159)
LYMPHOCYTES # BLD AUTO: 1.3 K/UL (ref 1–4.8)
LYMPHOCYTES NFR BLD: 15.4 % (ref 18–48)
MCH RBC QN AUTO: 34.6 PG (ref 27–31)
MCHC RBC AUTO-ENTMCNC: 32.8 G/DL (ref 32–36)
MCV RBC AUTO: 106 FL (ref 82–98)
MONOCYTES # BLD AUTO: 0.9 K/UL (ref 0.3–1)
MONOCYTES NFR BLD: 10.4 % (ref 4–15)
NEUTROPHILS # BLD AUTO: 5.9 K/UL (ref 1.8–7.7)
NEUTROPHILS NFR BLD: 68.3 % (ref 38–73)
NONHDLC SERPL-MCNC: 181 MG/DL
NRBC BLD-RTO: 0 /100 WBC
PLATELET # BLD AUTO: 171 K/UL (ref 150–450)
PMV BLD AUTO: 10 FL (ref 9.2–12.9)
POTASSIUM SERPL-SCNC: 5.2 MMOL/L (ref 3.5–5.1)
PROT SERPL-MCNC: 7.7 G/DL (ref 6–8.4)
RBC # BLD AUTO: 4.48 M/UL (ref 4–5.4)
SODIUM SERPL-SCNC: 133 MMOL/L (ref 136–145)
TRIGL SERPL-MCNC: 89 MG/DL (ref 30–150)
TSH SERPL DL<=0.005 MIU/L-ACNC: 3.2 UIU/ML (ref 0.4–4)
WBC # BLD AUTO: 8.65 K/UL (ref 3.9–12.7)

## 2023-06-15 PROCEDURE — 80061 LIPID PANEL: CPT | Performed by: INTERNAL MEDICINE

## 2023-06-15 PROCEDURE — 85025 COMPLETE CBC W/AUTO DIFF WBC: CPT | Performed by: INTERNAL MEDICINE

## 2023-06-15 PROCEDURE — 80053 COMPREHEN METABOLIC PANEL: CPT | Performed by: INTERNAL MEDICINE

## 2023-06-15 PROCEDURE — 83036 HEMOGLOBIN GLYCOSYLATED A1C: CPT | Performed by: INTERNAL MEDICINE

## 2023-06-15 PROCEDURE — 36415 COLL VENOUS BLD VENIPUNCTURE: CPT | Mod: PN | Performed by: INTERNAL MEDICINE

## 2023-06-15 PROCEDURE — 84443 ASSAY THYROID STIM HORMONE: CPT | Performed by: INTERNAL MEDICINE

## 2023-06-16 ENCOUNTER — TELEPHONE (OUTPATIENT)
Dept: PRIMARY CARE CLINIC | Facility: CLINIC | Age: 58
End: 2023-06-16
Payer: COMMERCIAL

## 2023-06-16 NOTE — TELEPHONE ENCOUNTER
Spoke with pt re: results. Pt aware an expresses understanding.    Pt states she did receive Mounjaro and started it Monday.

## 2023-06-16 NOTE — TELEPHONE ENCOUNTER
----- Message from Vishal Sanchez MD sent at 6/15/2023  8:45 PM CDT -----  The labs show the diabetes is still poorly controlled- the a1c is 10.7.    We sent mounjaro at the visit which I think will help a lot.    Can you make sure she was able to get the medication from pharmacy - she had troubles with getting the meds we prescribed after our last visit and we never heard back.

## 2023-08-09 DIAGNOSIS — G62.9 NEUROPATHY: ICD-10-CM

## 2023-08-09 RX ORDER — GABAPENTIN 300 MG/1
CAPSULE ORAL
Qty: 150 CAPSULE | Refills: 3 | Status: SHIPPED | OUTPATIENT
Start: 2023-08-09 | End: 2023-12-28 | Stop reason: SDUPTHER

## 2023-08-09 NOTE — TELEPHONE ENCOUNTER
No care due was identified.  [facility] Embedded Care Due Messages. Reference number: [id number].   8/09/2023 4:48:35 PM CDT

## 2023-09-15 ENCOUNTER — LAB VISIT (OUTPATIENT)
Dept: LAB | Facility: HOSPITAL | Age: 58
End: 2023-09-15
Attending: INTERNAL MEDICINE
Payer: COMMERCIAL

## 2023-09-15 DIAGNOSIS — E11.9 TYPE 2 DIABETES MELLITUS WITHOUT COMPLICATION: ICD-10-CM

## 2023-09-15 LAB
ESTIMATED AVG GLUCOSE: 206 MG/DL (ref 68–131)
HBA1C MFR BLD: 8.8 % (ref 4–5.6)

## 2023-09-15 PROCEDURE — 83036 HEMOGLOBIN GLYCOSYLATED A1C: CPT | Performed by: INTERNAL MEDICINE

## 2023-09-15 PROCEDURE — 36415 COLL VENOUS BLD VENIPUNCTURE: CPT | Mod: PN | Performed by: INTERNAL MEDICINE

## 2023-09-18 ENCOUNTER — TELEPHONE (OUTPATIENT)
Dept: PRIMARY CARE CLINIC | Facility: CLINIC | Age: 58
End: 2023-09-18
Payer: COMMERCIAL

## 2023-09-18 NOTE — TELEPHONE ENCOUNTER
----- Message from Vishal Sanchez MD sent at 9/16/2023 12:00 PM CDT -----  The bloodwork shows improvement in the a1c to 8.8 - good job.  Its stillhigher than goal though- we'd like to get you below 7.  Have you been tolerating the 7.5 mg dose of Mounjaro?  If so, we could continue titrating up the dose as high as you can tolerate.  We could increase to 10 mg now if you are ok with that and plan to go to 12.5 and then maximum dose of 15 if you do ok with it.

## 2023-09-19 ENCOUNTER — TELEPHONE (OUTPATIENT)
Dept: PRIMARY CARE CLINIC | Facility: CLINIC | Age: 58
End: 2023-09-19
Payer: COMMERCIAL

## 2023-09-19 NOTE — TELEPHONE ENCOUNTER
----- Message from Daja Alvarez sent at 9/19/2023 12:01 PM CDT -----  Contact: pt [phone]  Patient is returning a phone call.  Who left a message for the patient: Annika Beckford LPN   Does patient know what this is regarding: nresults  Would you like a call back, or a response through your [facility] portal?:   call     Please call and advise.    Thank You

## 2023-09-19 NOTE — TELEPHONE ENCOUNTER
----- Message from Daja Alvarez sent at 9/19/2023 12:01 PM CDT -----  Contact: pt [phone]  Patient is returning a phone call.  Who left a message for the patient: Annika Beckford LPN   Does patient know what this is regarding: nresults  Would you like a call back, or a response through your [facility] portal?:   call     Please call and advise.    Thank You

## 2023-09-19 NOTE — TELEPHONE ENCOUNTER
Spoke with pt  She was able to get medication   Have been on medication for about a week   Will follow up with us

## 2023-09-27 ENCOUNTER — PATIENT OUTREACH (OUTPATIENT)
Dept: ADMINISTRATIVE | Facility: HOSPITAL | Age: 58
End: 2023-09-27
Payer: COMMERCIAL

## 2023-09-27 NOTE — LETTER
AUTHORIZATION FOR RELEASE OF   CONFIDENTIAL INFORMATION    Dear [facility],    We are seeing Brunilda Delgadillo, date of birth 1965, in the clinic at [facility]. Vishal Sanchez MD is the patient's PCP. Brunilda Delgadillo has an outstanding lab/procedure at the time we reviewed her chart. In order to help keep her health information updated, she has authorized us to request the following medical record(s):        (  )  MAMMOGRAM                                      (  )  COLONOSCOPY      (  )  PAP SMEAR                                          (  )  OUTSIDE LAB RESULTS     (  )  DEXA SCAN                                          ( x )  EYE EXAM            (  )  FOOT EXAM                                          (  )  ENTIRE RECORD     (  )  OUTSIDE IMMUNIZATIONS                 (  )  _______________         Please fax records to Vishal Sanchez MD, [phone]          Patient Name: Brunilda Delgadillo  : 1965  Patient Phone #: [phone]

## 2023-10-03 ENCOUNTER — PATIENT OUTREACH (OUTPATIENT)
Dept: ADMINISTRATIVE | Facility: HOSPITAL | Age: 58
End: 2023-10-03
Payer: COMMERCIAL

## 2023-10-19 DIAGNOSIS — K70.31 ALCOHOLIC CIRRHOSIS OF LIVER WITH ASCITES: ICD-10-CM

## 2023-10-19 DIAGNOSIS — Z76.82 ORGAN TRANSPLANT CANDIDATE: ICD-10-CM

## 2023-10-19 DIAGNOSIS — R18.8 OTHER ASCITES: ICD-10-CM

## 2023-10-19 DIAGNOSIS — F33.41 RECURRENT MAJOR DEPRESSIVE DISORDER, IN PARTIAL REMISSION: ICD-10-CM

## 2023-10-19 RX ORDER — SPIRONOLACTONE 100 MG/1
200 TABLET, FILM COATED ORAL DAILY
Qty: 60 TABLET | Refills: 10 | Status: SHIPPED | OUTPATIENT
Start: 2023-10-19

## 2023-10-19 RX ORDER — DULOXETIN HYDROCHLORIDE 60 MG/1
60 CAPSULE, DELAYED RELEASE ORAL DAILY
Qty: 90 CAPSULE | Refills: 1 | Status: SHIPPED | OUTPATIENT
Start: 2023-10-19

## 2023-10-19 NOTE — TELEPHONE ENCOUNTER
No care due was identified.  [facility] Embedded Care Due Messages. Reference number: [id number].   10/19/2023 2:40:57 PM CDT

## 2023-10-19 NOTE — TELEPHONE ENCOUNTER
----- Message from Liliana Reddy sent at 10/19/2023  2:23 PM CDT -----  Contact: Home [phone]  Requesting an RX refill or new RX.  Is this a refill or new RX: Refill  RX name and strength  DULoxetine (CYMBALTA) 60 MG capsule  Is this a 30 day or 90 day RX: 90  Pharmacy name and phone #   [place] [address]  Phone: [phone] Fax: [phone]  The doctors have asked that we provide their patients with the following 2 reminders -- prescription refills can take up to 72 hours, and a friendly reminder that in the future you can use your [facility] account to request refills:       Requesting an RX refill or new RX.  Is this a refill or new RX: Refill  RX name and strength spironolactone (ALDACTONE) 100 MG tablet  Is this a 30 day or 90 day RX: 30  Pharmacy name and phone # [place] [phone]  Phone: [phone] Fax: [phone]  The doctors have asked that we provide their patients with the following 2 reminders -- prescription refills can take up to 72 hours, and a friendly reminder that in the future you can use your [facility] account to request refills:

## 2023-10-28 DIAGNOSIS — E11.42 TYPE 2 DIABETES MELLITUS WITH DIABETIC POLYNEUROPATHY, WITH LONG-TERM CURRENT USE OF INSULIN: ICD-10-CM

## 2023-10-28 DIAGNOSIS — Z79.4 TYPE 2 DIABETES MELLITUS WITH DIABETIC POLYNEUROPATHY, WITH LONG-TERM CURRENT USE OF INSULIN: ICD-10-CM

## 2023-10-28 RX ORDER — TIRZEPATIDE 2.5 MG/.5ML
2.5 INJECTION, SOLUTION SUBCUTANEOUS
Qty: 4 PEN | Refills: 2 | Status: SHIPPED | OUTPATIENT
Start: 2023-10-28 | End: 2023-12-28 | Stop reason: SDUPTHER

## 2023-10-28 NOTE — TELEPHONE ENCOUNTER
No care due was identified.  [facility] Embedded Care Due Messages. Reference number: [id number].   10/28/2023 3:23:30 AM CDT

## 2023-10-29 NOTE — TELEPHONE ENCOUNTER
Refill Routing Note   Medication(s) are not appropriate for processing by [facility] for the following reason(s):      Clarification of medication (Rx) details: 3 strengths active on med list, adherence reveals only 2.5mg dispensed--continue on lowest dose?    [facility] action(s):  Defer Care Due:  None identified   Medication Therapy Plan:         Appointments  past 12m or future 3m with PCP    Date Provider   Last Visit   6/6/2023 Vishal Sanchez MD   Next Visit   Visit date not found Vishal Sanchez MD   ED visits in past 90 days: 0        Note composed:8:18 PM 10/28/2023

## 2023-11-02 ENCOUNTER — TELEPHONE (OUTPATIENT)
Dept: PRIMARY CARE CLINIC | Facility: CLINIC | Age: 58
End: 2023-11-02
Payer: COMMERCIAL

## 2023-11-06 DIAGNOSIS — Z79.4 TYPE 2 DIABETES MELLITUS WITH DIABETIC POLYNEUROPATHY, WITH LONG-TERM CURRENT USE OF INSULIN: Primary | ICD-10-CM

## 2023-11-06 DIAGNOSIS — E11.42 TYPE 2 DIABETES MELLITUS WITH DIABETIC POLYNEUROPATHY, WITH LONG-TERM CURRENT USE OF INSULIN: Primary | ICD-10-CM

## 2023-11-06 RX ORDER — INSULIN GLARGINE 300 U/ML
INJECTION, SOLUTION SUBCUTANEOUS
Qty: 18 ML | Refills: 5 | Status: SHIPPED | OUTPATIENT
Start: 2023-11-06 | End: 2025-04-29

## 2023-11-06 NOTE — TELEPHONE ENCOUNTER
No care due was identified.  [facility] Embedded Care Due Messages. Reference number: [id number].   11/06/2023 4:08:28 PM CST

## 2023-11-06 NOTE — TELEPHONE ENCOUNTER
----- Message from Clarissa Gonzales sent at 11/6/2023  3:59 PM CST -----  Contact: P [phone]  Requesting an RX refill or new RX.  Is this a refill or new RX:   RX name and strength (copy/paste from chart):      insulin glargine, TOUJEO, (TOUJEO SOLOSTAR U-300 INSULIN) 300 unit/mL (1.5 mL) InPn pen  tirzepatide (MOUNJARO) 2.5 mg/0.5 mL PnIj    Is this a 30 day or 90 day RX: 30    [id number] - [place] AT [facility] & [address]  [place] [address]  Phone: [phone] Fax: [phone]

## 2023-11-09 DIAGNOSIS — E11.42 TYPE 2 DIABETES MELLITUS WITH DIABETIC POLYNEUROPATHY, WITH LONG-TERM CURRENT USE OF INSULIN: ICD-10-CM

## 2023-11-09 DIAGNOSIS — Z79.4 TYPE 2 DIABETES MELLITUS WITH DIABETIC POLYNEUROPATHY, WITH LONG-TERM CURRENT USE OF INSULIN: ICD-10-CM

## 2023-11-09 RX ORDER — TIRZEPATIDE 5 MG/.5ML
5 INJECTION, SOLUTION SUBCUTANEOUS
Qty: 4 PEN | Refills: 0 | Status: SHIPPED | OUTPATIENT
Start: 2023-11-09

## 2023-11-09 NOTE — TELEPHONE ENCOUNTER
No care due was identified.  [facility] Embedded Care Due Messages. Reference number: [id number].   11/09/2023 3:21:35 PM CST

## 2023-11-09 NOTE — TELEPHONE ENCOUNTER
----- Message from Kristyn Cheung sent at 11/9/2023  3:09 PM CST -----  Contact: [phone]  Requesting an RX refill or new RX.  Is this a refill or new RX:   RX name and strength (copy/paste from chart):  tirzepatide (MOUNJARO) 5 mg/0.5 mL PnIj  Is this a 30 day or 90 day RX:     [id number] - [place] & [address]  [place] [address]  Phone: [phone] Fax: [phone]

## 2023-12-28 DIAGNOSIS — E11.42 TYPE 2 DIABETES MELLITUS WITH DIABETIC POLYNEUROPATHY, WITH LONG-TERM CURRENT USE OF INSULIN: ICD-10-CM

## 2023-12-28 DIAGNOSIS — G62.9 NEUROPATHY: ICD-10-CM

## 2023-12-28 DIAGNOSIS — Z79.4 TYPE 2 DIABETES MELLITUS WITH DIABETIC POLYNEUROPATHY, WITH LONG-TERM CURRENT USE OF INSULIN: ICD-10-CM

## 2023-12-28 RX ORDER — FOLIC ACID 1 MG/1
1000 TABLET ORAL DAILY
Qty: 30 TABLET | Refills: 8 | Status: SHIPPED | OUTPATIENT
Start: 2023-12-28

## 2023-12-28 RX ORDER — GABAPENTIN 300 MG/1
CAPSULE ORAL
Qty: 150 CAPSULE | Refills: 3 | Status: SHIPPED | OUTPATIENT
Start: 2023-12-28

## 2023-12-28 RX ORDER — TIRZEPATIDE 2.5 MG/.5ML
2.5 INJECTION, SOLUTION SUBCUTANEOUS
Qty: 4 PEN | Refills: 2 | Status: SHIPPED | OUTPATIENT
Start: 2023-12-28

## 2023-12-28 RX ORDER — TIRZEPATIDE 5 MG/.5ML
5 INJECTION, SOLUTION SUBCUTANEOUS
Qty: 4 PEN | Refills: 0 | Status: CANCELLED | OUTPATIENT
Start: 2023-12-28

## 2023-12-28 NOTE — TELEPHONE ENCOUNTER
Care Due:                  Date            Visit Type   Department     Provider  --------------------------------------------------------------------------------                                EP -                              PRIMARY      [facility] Primary  Last Visit: 06-      CARE ([facility])   Jenelle Sanchez  Next Visit: None Scheduled  None         None Found                                                            Last  Test          Frequency    Reason                     Performed    Due Date  --------------------------------------------------------------------------------    HBA1C.......  6 months...  insulin, tirzepatide.....  09-   03-    [facility] Embedded Care Due Messages. Reference number: [id number].   12/28/2023 3:04:53 PM CST

## 2023-12-28 NOTE — TELEPHONE ENCOUNTER
----- Message from Cady Gutierrez sent at 12/28/2023  2:40 PM CST -----  Contact: [phone] (H)  Requesting an RX refill or new RX.  Is this a refill or new RX: Refill 1  RX name and strength (copy/paste from chart):  tirzepatide (MOUNJARO) 2.5 mg/0.5 mL PnIj  Is this a 30 day or 90 day RX:   Pharmacy name and phone # (copy/paste from chart):  [id number] - [place], [place] - [address] [facility] [address] [facility] AT [facility]   Phone: [phone]  Fax: [phone]        The doctors have asked that we provide their patients with the following 2 reminders -- prescription refills can take up to 72 hours, and a friendly reminder that in the future you can use your [facility] account to request refills:

## 2023-12-28 NOTE — TELEPHONE ENCOUNTER
----- Message from Cady Gutierrez sent at 12/28/2023  2:39 PM CST -----  Contact: [phone] (H)  Requesting an RX refill or new RX.  Is this a refill or new RX: Refill 1  RX name and strength (copy/paste from chart):  gabapentin (NEURONTIN) 300 MG capsule  Is this a 30 day or 90 day RX:   Pharmacy name and phone # (copy/paste from chart):  [address]   Phone: [phone]   Fax: [phone]      Requesting an RX refill or new RX.  Is this a refill or new RX: Refill 2  RX name and strength (copy/paste from chart): folic acid (FOLVITE) 1 MG tablet  Is this a 30 day or 90 day RX:   Pharmacy name and phone # (copy/paste from chart):  [address]   Phone: [phone]  Fax: [phone]          The doctors have asked that we provide their patients with the following 2 reminders -- prescription refills can take up to 72 hours, and a friendly reminder that in the future you can use your [facility] account to request refills:

## 2024-01-03 DIAGNOSIS — E11.9 TYPE 2 DIABETES MELLITUS WITHOUT COMPLICATION: ICD-10-CM

## 2024-02-26 LAB
LEFT EYE DM RETINOPATHY: NEGATIVE
RIGHT EYE DM RETINOPATHY: NEGATIVE

## 2024-03-14 ENCOUNTER — PATIENT MESSAGE (OUTPATIENT)
Dept: ADMINISTRATIVE | Facility: HOSPITAL | Age: 59
End: 2024-03-14
Payer: COMMERCIAL

## 2024-04-11 DIAGNOSIS — Z79.4 TYPE 2 DIABETES MELLITUS WITH DIABETIC POLYNEUROPATHY, WITH LONG-TERM CURRENT USE OF INSULIN: ICD-10-CM

## 2024-04-11 DIAGNOSIS — E11.42 TYPE 2 DIABETES MELLITUS WITH DIABETIC POLYNEUROPATHY, WITH LONG-TERM CURRENT USE OF INSULIN: ICD-10-CM

## 2024-04-11 NOTE — TELEPHONE ENCOUNTER
Care Due:                  Date            Visit Type   Department     Provider  --------------------------------------------------------------------------------                                EP -                              PRIMARY      [facility] Primary  Last Visit: 06-      CARE ([facility])   Jenelle Sanchez  Next Visit: None Scheduled  None         None Found                                                            Last  Test          Frequency    Reason                     Performed    Due Date  --------------------------------------------------------------------------------    Office Visit  12 months..  folic....................  06- 05-    CMP.........  12 months..  DULoxetine, insulin,       06-   06-                             spironolactone...........    HBA1C.......  6 months...  insulin, tirzepatide.....  09-   03-    [facility] Embedded Care Due Messages. Reference number: [id number].   4/11/2024 11:23:25 AM CDT

## 2024-04-12 RX ORDER — INSULIN GLARGINE 300 U/ML
INJECTION, SOLUTION SUBCUTANEOUS
Qty: 4.5 ML | Refills: 4 | Status: SHIPPED | OUTPATIENT
Start: 2024-04-12

## 2024-04-12 NOTE — TELEPHONE ENCOUNTER
Refill Routing Note   Medication(s) are not appropriate for processing by [facility] for the following reason(s):        Required labs outdated    [facility] action(s):  Defer   Requires appointment : Yes     Requires labs : Yes      Medication Therapy Plan: A1c outdated      Appointments  past 12m or future 3m with PCP    Date Provider   Last Visit   6/6/2023 Vishal Sanchez MD   Next Visit   Visit date not found Vishal Sanchez MD   ED visits in past 90 days: 0        Note composed:1:06 PM 04/12/2024

## 2024-04-15 ENCOUNTER — PATIENT OUTREACH (OUTPATIENT)
Dept: ADMINISTRATIVE | Facility: HOSPITAL | Age: 59
End: 2024-04-15
Payer: COMMERCIAL

## 2024-04-15 NOTE — LETTER
AUTHORIZATION FOR RELEASE OF   CONFIDENTIAL INFORMATION    Dear [facility],    We are seeing Brunilda Delgadillo, date of birth 1965, in the clinic at [facility]. Vishal Sacnhez MD is the patient's PCP. Brunilda Delgadillo has an outstanding lab/procedure at the time we reviewed her chart. In order to help keep her health information updated, she has authorized us to request the following medical record(s):        (  )  MAMMOGRAM                                      (  )  COLONOSCOPY      (  )  PAP SMEAR                                          (  )  OUTSIDE LAB RESULTS     (  )  DEXA SCAN                                          ( x )  EYE EXAM            (  )  FOOT EXAM                                          (  )  ENTIRE RECORD     (  )  OUTSIDE IMMUNIZATIONS                 (  )  _______________         Please fax records to Vishal Sanchez MD, [phone]      Patient Name: Brunilda Delgadillo  : 1965  Patient Phone #: [phone]

## 2024-04-17 ENCOUNTER — PATIENT OUTREACH (OUTPATIENT)
Dept: ADMINISTRATIVE | Facility: HOSPITAL | Age: 59
End: 2024-04-17
Payer: COMMERCIAL

## 2024-04-26 DIAGNOSIS — G62.9 NEUROPATHY: ICD-10-CM

## 2024-04-26 NOTE — TELEPHONE ENCOUNTER
No care due was identified.  [facility] Embedded Care Due Messages. Reference number: [id number].   4/26/2024 9:44:07 AM CDT

## 2024-04-28 RX ORDER — GABAPENTIN 300 MG/1
CAPSULE ORAL
Qty: 150 CAPSULE | Refills: 3 | Status: SHIPPED | OUTPATIENT
Start: 2024-04-28

## 2024-05-02 ENCOUNTER — PATIENT MESSAGE (OUTPATIENT)
Dept: ADMINISTRATIVE | Facility: HOSPITAL | Age: 59
End: 2024-05-02
Payer: COMMERCIAL

## 2024-05-02 ENCOUNTER — PATIENT OUTREACH (OUTPATIENT)
Dept: ADMINISTRATIVE | Facility: HOSPITAL | Age: 59
End: 2024-05-02
Payer: COMMERCIAL

## 2024-06-26 DIAGNOSIS — E11.9 TYPE 2 DIABETES MELLITUS WITHOUT COMPLICATION: ICD-10-CM

## 2024-06-28 DIAGNOSIS — Z79.4 TYPE 2 DIABETES MELLITUS WITH DIABETIC POLYNEUROPATHY, WITH LONG-TERM CURRENT USE OF INSULIN: ICD-10-CM

## 2024-06-28 DIAGNOSIS — E11.42 TYPE 2 DIABETES MELLITUS WITH DIABETIC POLYNEUROPATHY, WITH LONG-TERM CURRENT USE OF INSULIN: ICD-10-CM

## 2024-06-28 DIAGNOSIS — F33.41 RECURRENT MAJOR DEPRESSIVE DISORDER, IN PARTIAL REMISSION: ICD-10-CM

## 2024-06-28 RX ORDER — DULOXETIN HYDROCHLORIDE 60 MG/1
60 CAPSULE, DELAYED RELEASE ORAL DAILY
Qty: 90 CAPSULE | Refills: 1 | Status: SHIPPED | OUTPATIENT
Start: 2024-06-28

## 2024-06-28 RX ORDER — TIRZEPATIDE 2.5 MG/.5ML
2.5 INJECTION, SOLUTION SUBCUTANEOUS
Qty: 4 PEN | Refills: 2 | Status: SHIPPED | OUTPATIENT
Start: 2024-06-28

## 2024-06-28 NOTE — TELEPHONE ENCOUNTER
Care Due:                  Date            Visit Type   Department     Provider  --------------------------------------------------------------------------------                                EP -                              PRIMARY      [facility] Primary  Last Visit: 06-      CARE ([facility])   Jenelle Sanchez  Next Visit: None Scheduled  None         None Found                                                            Last  Test          Frequency    Reason                     Performed    Due Date  --------------------------------------------------------------------------------    Office Visit  12 months..  DULoxetine, folic,         06- 05-                             insulin, spironolactone,                             tirzepatide..............    CMP.........  12 months..  DULoxetine, insulin,       06-   06-                             spironolactone...........    HBA1C.......  6 months...  insulin, tirzepatide.....  09-   03-    [facility] Embedded Care Due Messages. Reference number: [id number].   6/28/2024 3:17:57 PM CDT

## 2024-06-28 NOTE — TELEPHONE ENCOUNTER
----- Message from Jonathan West sent at 6/28/2024  3:06 PM CDT -----  Contact: [phone]@patient  Requesting an RX refill or new RX.tirzepatide (MOUNJARO) 2.5 mg/0.5 mL PnIj    Is this a refill or new RX: refill     RX name and strength (copy/paste from chart):      Is this a 30 day or 90 day RX:     Pharmacy name and phone #  [facility] #[id number] - [place], [place] - [address] AT [facility]        The doctors have asked that we provide their patients with the following 2 reminders -- prescription refills can take up to 72 hours, and a friendly reminder that in the future you can use your [facility] account to request refills:     Requesting an RX refill or new RX.DULoxetine (CYMBALTA) 60 MG capsule    Is this a refill or new RX: refill    RX name and strength (copy/paste from chart):      Is this a 30 day or 90 day RX:     Pharmacy name and phone #  [place], [place] - [address] [facility]        The doctors have asked that we provide their patients with the following 2 reminders -- prescription refills can take up to 72 hours, and a friendly reminder that in the future you can use your [facility] account to request refills:

## 2024-07-24 DIAGNOSIS — Z12.31 OTHER SCREENING MAMMOGRAM: ICD-10-CM

## 2024-08-18 NOTE — TELEPHONE ENCOUNTER
Liver Transplant Social Work Consult:    SW following up with patient today in regards to addiction psychiatry and liver transplant  recommendations for patient to enroll in IOP and attend 12 step meetings for relapse prevention.   Pt engaged during phone conversation.   Pt reports continued sobriety since last Fall 2019, still adamant that she stopped drinking August 2019, though patient did not present with a negative PETH test in December 2019.    Pt reports she had chosen not to enroll in an IOP or attend AA meetings.  She felt like she is able to maintain sobriety on her own.  She does not plan to start drinking again.   SW counseled patient about not following addiction psychiatry and transplant social work recommendations could affect her candidacy for liver transplantation.   Pt verbalized understanding today.   No further psychosocial needs identified at this time.  [facility] available for all transplant resources, education and psychosocial support.   .

## 2024-09-03 DIAGNOSIS — E11.42 TYPE 2 DIABETES MELLITUS WITH DIABETIC POLYNEUROPATHY, WITH LONG-TERM CURRENT USE OF INSULIN: ICD-10-CM

## 2024-09-03 DIAGNOSIS — Z79.4 TYPE 2 DIABETES MELLITUS WITH DIABETIC POLYNEUROPATHY, WITH LONG-TERM CURRENT USE OF INSULIN: ICD-10-CM

## 2024-09-03 NOTE — TELEPHONE ENCOUNTER
Care Due:                  Date            Visit Type   Department     Provider  --------------------------------------------------------------------------------                                EP -                              PRIMARY      [facility] Primary  Last Visit: 06-      CARE ([facility])   Jenelle Sanchez                              EP -                              PRIMARY      [facility] Primary  Next Visit: 09-      CARE ([facility])   Jenelle Sanchez                                                            Last  Test          Frequency    Reason                     Performed    Due Date  --------------------------------------------------------------------------------    CMP.........  12 months..  DULoxetine, insulin,       06-   06-                             spironolactone...........    HBA1C.......  6 months...  insulin, tirzepatide.....  09-   03-    [facility] Embedded Care Due Messages. Reference number: [id number].   9/03/2024 5:01:32 PM CDT

## 2024-09-03 NOTE — TELEPHONE ENCOUNTER
----- Message from Khushbu Kent sent at 9/3/2024  4:52 PM CDT -----  Contact: [phone]  Requesting an RX refill or new RX.    Is this a refill or new RX: Refill     RX name and strength (copy/paste from chart):  insulin glargine, TOUJEO, (TOUJEO SOLOSTAR U-300 INSULIN) 300 unit/mL (1.5 mL) InPn pen    Is this a 30 day or 90 day RX:     Pharmacy name and phone # (copy/paste from chart):    [address] [address]  Phone: [phone] Fax: [phone]      The doctors have asked that we provide their patients with the following 2 reminders -- prescription refills can take up to 72 hours, and a friendly reminder that in the future you can use your [facility] account to request refills: calll back

## 2024-09-04 RX ORDER — INSULIN GLARGINE 300 [IU]/ML
INJECTION, SOLUTION SUBCUTANEOUS
Qty: 4.5 ML | Refills: 4 | Status: SHIPPED | OUTPATIENT
Start: 2024-09-04

## 2024-10-14 DIAGNOSIS — G62.9 NEUROPATHY: ICD-10-CM

## 2024-10-14 RX ORDER — GABAPENTIN 300 MG/1
CAPSULE ORAL
Qty: 150 CAPSULE | Refills: 3 | Status: SHIPPED | OUTPATIENT
Start: 2024-10-14

## 2024-10-14 NOTE — TELEPHONE ENCOUNTER
Pt would like refill on gabapentin (NEURONTIN) 300 MG capsule 30 day rx; pt has annual scheduled for 10/29.

## 2024-10-14 NOTE — TELEPHONE ENCOUNTER
No care due was identified.  [facility] Embedded Care Due Messages. Reference number: [id number].   10/14/2024 3:21:28 PM CDT

## 2024-10-27 PROBLEM — F10.988 ALCOHOL USE, UNSPECIFIED WITH OTHER ALCOHOL-INDUCED DISORDER: Status: ACTIVE | Noted: 2024-10-27

## 2024-10-29 ENCOUNTER — OFFICE VISIT (OUTPATIENT)
Dept: PRIMARY CARE CLINIC | Facility: CLINIC | Age: 59
End: 2024-10-29
Payer: COMMERCIAL

## 2024-10-29 VITALS
HEIGHT: 64 IN | DIASTOLIC BLOOD PRESSURE: 84 MMHG | BODY MASS INDEX: 30.63 KG/M2 | HEART RATE: 109 BPM | SYSTOLIC BLOOD PRESSURE: 130 MMHG | OXYGEN SATURATION: 95 % | WEIGHT: 179.44 LBS

## 2024-10-29 DIAGNOSIS — F41.9 ANXIETY DISORDER, UNSPECIFIED TYPE: ICD-10-CM

## 2024-10-29 DIAGNOSIS — I85.00 ESOPHAGEAL VARICES WITHOUT BLEEDING, UNSPECIFIED ESOPHAGEAL VARICES TYPE: ICD-10-CM

## 2024-10-29 DIAGNOSIS — K70.31 ASCITES DUE TO ALCOHOLIC CIRRHOSIS: ICD-10-CM

## 2024-10-29 DIAGNOSIS — K70.30 ALCOHOLIC CIRRHOSIS OF LIVER WITHOUT ASCITES: ICD-10-CM

## 2024-10-29 DIAGNOSIS — K76.6 PORTAL HYPERTENSIVE GASTROPATHY: ICD-10-CM

## 2024-10-29 DIAGNOSIS — Z79.4 TYPE 2 DIABETES MELLITUS WITH DIABETIC POLYNEUROPATHY, WITH LONG-TERM CURRENT USE OF INSULIN: Primary | ICD-10-CM

## 2024-10-29 DIAGNOSIS — D69.6 THROMBOCYTOPENIA, UNSPECIFIED: ICD-10-CM

## 2024-10-29 DIAGNOSIS — F10.988 ALCOHOL USE, UNSPECIFIED WITH OTHER ALCOHOL-INDUCED DISORDER: ICD-10-CM

## 2024-10-29 DIAGNOSIS — K31.89 PORTAL HYPERTENSIVE GASTROPATHY: ICD-10-CM

## 2024-10-29 DIAGNOSIS — F33.41 RECURRENT MAJOR DEPRESSIVE DISORDER, IN PARTIAL REMISSION: ICD-10-CM

## 2024-10-29 DIAGNOSIS — E11.42 TYPE 2 DIABETES MELLITUS WITH DIABETIC POLYNEUROPATHY, WITH LONG-TERM CURRENT USE OF INSULIN: Primary | ICD-10-CM

## 2024-10-29 DIAGNOSIS — K76.6 PORTAL HYPERTENSION: ICD-10-CM

## 2024-10-29 PROCEDURE — 1159F MED LIST DOCD IN RCRD: CPT | Mod: CPTII,S$GLB,, | Performed by: INTERNAL MEDICINE

## 2024-10-29 PROCEDURE — 2023F DILAT RTA XM W/O RTNOPTHY: CPT | Mod: CPTII,S$GLB,, | Performed by: INTERNAL MEDICINE

## 2024-10-29 PROCEDURE — 99214 OFFICE O/P EST MOD 30 MIN: CPT | Mod: S$GLB,,, | Performed by: INTERNAL MEDICINE

## 2024-10-29 PROCEDURE — 99999 PR PBB SHADOW E&M-EST. PATIENT-LVL IV: CPT | Mod: PBBFAC,,, | Performed by: INTERNAL MEDICINE

## 2024-10-29 PROCEDURE — 3008F BODY MASS INDEX DOCD: CPT | Mod: CPTII,S$GLB,, | Performed by: INTERNAL MEDICINE

## 2024-10-29 PROCEDURE — 3075F SYST BP GE 130 - 139MM HG: CPT | Mod: CPTII,S$GLB,, | Performed by: INTERNAL MEDICINE

## 2024-10-29 PROCEDURE — 3079F DIAST BP 80-89 MM HG: CPT | Mod: CPTII,S$GLB,, | Performed by: INTERNAL MEDICINE

## 2024-10-29 RX ORDER — TIRZEPATIDE 5 MG/.5ML
5 INJECTION, SOLUTION SUBCUTANEOUS
Qty: 4 PEN | Refills: 0 | Status: SHIPPED | OUTPATIENT
Start: 2024-10-29

## 2024-11-07 ENCOUNTER — LAB VISIT (OUTPATIENT)
Dept: LAB | Facility: HOSPITAL | Age: 59
End: 2024-11-07
Attending: INTERNAL MEDICINE
Payer: COMMERCIAL

## 2024-11-07 ENCOUNTER — TELEPHONE (OUTPATIENT)
Dept: PRIMARY CARE CLINIC | Facility: CLINIC | Age: 59
End: 2024-11-07
Payer: COMMERCIAL

## 2024-11-07 DIAGNOSIS — E11.42 TYPE 2 DIABETES MELLITUS WITH DIABETIC POLYNEUROPATHY, WITH LONG-TERM CURRENT USE OF INSULIN: ICD-10-CM

## 2024-11-07 DIAGNOSIS — Z79.4 TYPE 2 DIABETES MELLITUS WITH DIABETIC POLYNEUROPATHY, WITH LONG-TERM CURRENT USE OF INSULIN: ICD-10-CM

## 2024-11-07 PROCEDURE — 82570 ASSAY OF URINE CREATININE: CPT | Performed by: INTERNAL MEDICINE

## 2024-11-07 NOTE — TELEPHONE ENCOUNTER
Spoke to pt; she is tolerating the mounjaro well. Still on 2.5 mg and then she will start the 5 mg. She will schedule labs tmr.

## 2024-11-08 ENCOUNTER — TELEPHONE (OUTPATIENT)
Dept: PRIMARY CARE CLINIC | Facility: CLINIC | Age: 59
End: 2024-11-08
Payer: COMMERCIAL

## 2024-11-08 LAB
ALBUMIN/CREAT UR: NORMAL UG/MG (ref 0–30)
CREAT UR-MCNC: 47 MG/DL (ref 15–325)
MICROALBUMIN UR DL<=1MG/L-MCNC: <5 UG/ML

## 2024-12-21 ENCOUNTER — OFFICE VISIT (OUTPATIENT)
Dept: URGENT CARE | Facility: CLINIC | Age: 59
End: 2024-12-21
Payer: COMMERCIAL

## 2024-12-21 VITALS
TEMPERATURE: 96 F | RESPIRATION RATE: 16 BRPM | WEIGHT: 179 LBS | HEIGHT: 64 IN | HEART RATE: 99 BPM | DIASTOLIC BLOOD PRESSURE: 68 MMHG | OXYGEN SATURATION: 97 % | SYSTOLIC BLOOD PRESSURE: 100 MMHG | BODY MASS INDEX: 30.56 KG/M2

## 2024-12-21 DIAGNOSIS — M86.8X8 OTHER OSTEOMYELITIS, OTHER SITE: ICD-10-CM

## 2024-12-21 DIAGNOSIS — L08.9 TOE INFECTION: Primary | ICD-10-CM

## 2024-12-21 PROCEDURE — 73660 X-RAY EXAM OF TOE(S): CPT | Mod: RT,S$GLB,, | Performed by: RADIOLOGY

## 2024-12-21 PROCEDURE — 99213 OFFICE O/P EST LOW 20 MIN: CPT | Mod: S$GLB,,, | Performed by: FAMILY MEDICINE

## 2024-12-21 RX ORDER — SULFAMETHOXAZOLE AND TRIMETHOPRIM 800; 160 MG/1; MG/1
1 TABLET ORAL 2 TIMES DAILY
Qty: 14 TABLET | Refills: 0 | Status: SHIPPED | OUTPATIENT
Start: 2024-12-21 | End: 2024-12-22

## 2024-12-21 RX ORDER — MUPIROCIN 20 MG/G
OINTMENT TOPICAL
Status: COMPLETED | OUTPATIENT
Start: 2024-12-21 | End: 2024-12-21

## 2024-12-21 RX ADMIN — MUPIROCIN: 20 OINTMENT TOPICAL at 05:12

## 2024-12-21 NOTE — PATIENT INSTRUCTIONS
Avoid picking the ulcerated toe  Monitor for signs and symptoms of infection  Wound check in 3 days. No later than 7 days.    I have placed a podiatry referral  Call to schedule an appointment: [phone]

## 2024-12-21 NOTE — PROGRESS NOTES
"Subjective:      Patient ID: Brunilda Delgadillo is a 59 y.o. female.    Vitals:  height is 5' 4" (1.626 m) and weight is 81.2 kg (179 lb). Her temporal temperature is 96.4 °F (35.8 °C). Her blood pressure is 100/68 and her pulse is 99. Her respiration is 16 and oxygen saturation is 97%.     Chief Complaint: Injury (Located to the right foot #2 toe)    Patient is a 59 y.o. female with diabetes who reports self injury to right toe by picking the skin of her foot. Now bleeding and with open ulcer. She denies worsening pain or neuropathy. States she could in future stop picking the scaly dry skin off her toe. No trauma otherwise.    Injury  This is a new problem. Pertinent negatives include no abdominal pain, anorexia, arthralgias, change in bowel habit, chest pain, chills, congestion, coughing, diaphoresis, fatigue, fever, headaches, joint swelling, myalgias, nausea, neck pain, numbness, rash, sore throat, swollen glands, urinary symptoms, vertigo, visual change, vomiting or weakness. The symptoms are aggravated by walking. She has tried nothing for the symptoms.       Constitution: Negative for chills, sweating, fatigue and fever.   HENT:  Negative for congestion and sore throat.    Neck: Negative for neck pain.   Cardiovascular:  Negative for chest pain.   Respiratory:  Negative for cough.    Gastrointestinal:  Negative for abdominal pain, nausea and vomiting.   Musculoskeletal:  Negative for joint pain, joint swelling and muscle ache.   Skin:  Negative for rash.   Neurological:  Negative for history of vertigo, headaches and numbness.      Objective:     Vitals:    12/21/24 1650   BP: 100/68   BP Location: Left arm   Patient Position: Sitting   Pulse: 99   Resp: 16   Temp: 96.4 °F (35.8 °C)   TempSrc: Temporal   SpO2: 97%   Weight: 81.2 kg (179 lb)   Height: 5' 4" (1.626 m)      Physical Exam   Musculoskeletal:      Comments: Right toe ulcerated with bleeding. No focal bony tenderness. Peripheral pulses intact. No " gross sensory deficit.   Neurological: She is alert.     X-Ray Toe 2 or More Views Right    Result Date: 12/21/2024  EXAMINATION: XR TOE 2 OR MORE VIEWS RIGHT CLINICAL HISTORY: Non-pressure chronic ulcer of other part of right foot limited to breakdown of skin TECHNIQUE: Three views of the right toes were performed COMPARISON: None FINDINGS: Osteopenia.  Present postsurgical changes of 2nd toe D IP disarticulation.  Radiographic abnormalities at the 2nd toe middle phalanx, proximal phalangeal head and neck and PIP joint compatible with osteomyelitis and septic arthritis.  Regional soft tissue swelling.     As above Electronically signed by: Ruma Walker Date:    12/21/2024 Time:    17:54     Assessment:     1. Toe infection    2. Other osteomyelitis, other site- abnormal X-ray of 2nd right toe        Plan:       Toe infection  X-ray concerning for osteomyelitis  -     X-Ray Toe 2 or More Views Right; Future; Expected date: 12/21/2024  -     mupirocin 2 % ointment  -     sulfamethoxazole-trimethoprim 800-160mg (BACTRIM DS) 800-160 mg Tab; Take 1 tablet by mouth 2 (two) times daily. for 7 days  Dispense: 14 tablet; Refill: 0  -     Ambulatory referral/consult to Podiatry      Patient Instructions   Avoid picking the ulcerated toe  Monitor for signs and symptoms of infection  Wound check in 3 days. No later than 7 days.    I have placed a podiatry referral  Call to schedule an appointment: [phone]

## 2024-12-22 RX ORDER — CIPROFLOXACIN 500 MG/1
500 TABLET ORAL EVERY 12 HOURS
Qty: 28 TABLET | Refills: 0 | Status: SHIPPED | OUTPATIENT
Start: 2024-12-22 | End: 2025-01-05

## 2024-12-23 DIAGNOSIS — R18.8 OTHER ASCITES: ICD-10-CM

## 2024-12-23 DIAGNOSIS — K70.31 ALCOHOLIC CIRRHOSIS OF LIVER WITH ASCITES: ICD-10-CM

## 2024-12-23 DIAGNOSIS — Z76.82 ORGAN TRANSPLANT CANDIDATE: ICD-10-CM

## 2024-12-23 RX ORDER — SPIRONOLACTONE 100 MG/1
200 TABLET, FILM COATED ORAL DAILY
Qty: 60 TABLET | Refills: 3 | Status: SHIPPED | OUTPATIENT
Start: 2024-12-23

## 2024-12-23 NOTE — TELEPHONE ENCOUNTER
----- Message from HistoryFile sent at 12/23/2024  3:25 PM CST -----  Type:  RX Refill Request    Who Called: pt  Refill or New Rx:refill  RX Name and Strength:spironolactone (ALDACTONE) 100 MG tablet  How is the patient currently taking it? (ex. 1XDay):1xDay  Is this a 30 day or 90 day RX:30  Preferred Pharmacy with phone number:[address]   Phone: [phone]  Fax: [phone]  Local or Mail Order:local  Ordering Provider:Daniel  Would the patient rather a call back or a response via [facility]? Call back  Best Call Back Number:[phone]   Additional Information: Pt is completely out of her medcation

## 2024-12-23 NOTE — TELEPHONE ENCOUNTER
No care due was identified.  [facility] Embedded Care Due Messages. Reference number: [id number].   12/23/2024 3:34:20 PM CST

## 2024-12-24 ENCOUNTER — TELEPHONE (OUTPATIENT)
Dept: PODIATRY | Facility: CLINIC | Age: 59
End: 2024-12-24
Payer: COMMERCIAL

## 2024-12-26 ENCOUNTER — TELEPHONE (OUTPATIENT)
Dept: PODIATRY | Facility: CLINIC | Age: 59
End: 2024-12-26
Payer: COMMERCIAL

## 2024-12-26 NOTE — TELEPHONE ENCOUNTER
----- Message from Nadia Devlin DPM sent at 12/24/2024  2:02 PM CST -----  Regarding: appt  Pt has appt on 12/30 with me... pt new to me.    After reviewing imaging and urgent care note I am concerned pt has osteomyleitis/toe infection in dm setting and not seeing podiatry until 12/30.    Please see if there is another provider that has a sooner appt (preferable someone who does surgery as she may need toe amputation) or pt will need to return to the urgent care/ED for a wound check as stated in the urgent care note..needs to f/u wound check 3-7 days

## 2024-12-26 NOTE — TELEPHONE ENCOUNTER
Tried to scheduled patient to be evaluated sooner with another provider, but no luck in scheduling sooner than 12/30 as scheduled with Dr Devlin, per Dr Devlin request. Sent Dr. Swanson staff a pool message to check availability for possible surgical intervention per DR Devlin.

## 2024-12-30 ENCOUNTER — OFFICE VISIT (OUTPATIENT)
Dept: PODIATRY | Facility: CLINIC | Age: 59
End: 2024-12-30
Payer: COMMERCIAL

## 2024-12-30 VITALS
BODY MASS INDEX: 31.62 KG/M2 | WEIGHT: 185.19 LBS | DIASTOLIC BLOOD PRESSURE: 83 MMHG | HEART RATE: 118 BPM | HEIGHT: 64 IN | SYSTOLIC BLOOD PRESSURE: 120 MMHG

## 2024-12-30 DIAGNOSIS — Z79.4 TYPE 2 DIABETES MELLITUS WITH DIABETIC POLYNEUROPATHY, WITH LONG-TERM CURRENT USE OF INSULIN: Primary | ICD-10-CM

## 2024-12-30 DIAGNOSIS — E11.42 TYPE 2 DIABETES MELLITUS WITH DIABETIC POLYNEUROPATHY, WITH LONG-TERM CURRENT USE OF INSULIN: Primary | ICD-10-CM

## 2024-12-30 DIAGNOSIS — M86.9 OSTEOMYELITIS OF SECOND TOE OF RIGHT FOOT: ICD-10-CM

## 2024-12-30 PROCEDURE — 3061F NEG MICROALBUMINURIA REV: CPT | Mod: CPTII,S$GLB,, | Performed by: PODIATRIST

## 2024-12-30 PROCEDURE — 99214 OFFICE O/P EST MOD 30 MIN: CPT | Mod: S$GLB,,, | Performed by: PODIATRIST

## 2024-12-30 PROCEDURE — 3074F SYST BP LT 130 MM HG: CPT | Mod: CPTII,S$GLB,, | Performed by: PODIATRIST

## 2024-12-30 PROCEDURE — 3079F DIAST BP 80-89 MM HG: CPT | Mod: CPTII,S$GLB,, | Performed by: PODIATRIST

## 2024-12-30 PROCEDURE — 3066F NEPHROPATHY DOC TX: CPT | Mod: CPTII,S$GLB,, | Performed by: PODIATRIST

## 2024-12-30 PROCEDURE — 1160F RVW MEDS BY RX/DR IN RCRD: CPT | Mod: CPTII,S$GLB,, | Performed by: PODIATRIST

## 2024-12-30 PROCEDURE — 99999 PR PBB SHADOW E&M-EST. PATIENT-LVL IV: CPT | Mod: PBBFAC,,, | Performed by: PODIATRIST

## 2024-12-30 PROCEDURE — 3052F HG A1C>EQUAL 8.0%<EQUAL 9.0%: CPT | Mod: CPTII,S$GLB,, | Performed by: PODIATRIST

## 2024-12-30 PROCEDURE — 1159F MED LIST DOCD IN RCRD: CPT | Mod: CPTII,S$GLB,, | Performed by: PODIATRIST

## 2024-12-30 PROCEDURE — 3008F BODY MASS INDEX DOCD: CPT | Mod: CPTII,S$GLB,, | Performed by: PODIATRIST

## 2024-12-30 NOTE — PROGRESS NOTES
Subjective:      Patient ID: Brunilda Delgadillo is a 59 y.o. female.    Chief Complaint:   Wound Care (Right 2nd toe) and Diabetes Mellitus (PCP- 10/29/2024/Vishal Sanchez MD)    Brunilda is a 59 y.o. female who presents to the clinic for evaluation and treatment of high risk feet. Brunilda has a past medical history of Anxiety disorder (8/21/2019), Ascites (8/21/2019), Cirrhosis, Depression (8/21/2019), Diabetes mellitus, History of cellulitis (8/21/2019), Hyponatremia (8/21/2019), Portal hypertensive gastropathy (8/21/2019), and Type 2 diabetes mellitus (8/21/2019). The patient's chief complaint is foot ulcer,     Patient new to me  Here is a follow-up from urgent care for a wound to her 2nd toe  Pt relates that she has noticed it enlarged for some time now. Likes to pick at her skin. Applied neosporin  Taking oral abx from urgent care   + neuropathy   No etoh currently        12/21/24:  Chief Complaint: Injury (Located to the right foot #2 toe)  Patient is a 59 y.o. female with diabetes who reports self injury to right toe by picking the skin of her foot. Now bleeding and with open ulcer. She denies worsening pain or neuropathy. States she could in future stop picking the scaly dry skin off her toe. No trauma otherwise.  1. Toe infection    2. Other osteomyelitis, other site- abnormal X-ray of 2nd right toe          Plan:         Toe infection  X-ray concerning for osteomyelitis  -     X-Ray Toe 2 or More Views Right; Future; Expected date: 12/21/2024  -     mupirocin 2 % ointment  -     sulfamethoxazole-trimethoprim 800-160mg (BACTRIM DS) 800-160 mg Tab; Take 1 tablet by mouth 2 (two) times daily. for 7 days  Dispense: 14 tablet; Refill: 0  -     Ambulatory referral/consult to Podiatry     Addendum 12/22/24 9:22 am  Patient did not  Bactrim from pharmacy. Will change antibiotic to ciprofloxacin; discussed side effects of neuropathy, aneurysm, tendon rupture. GI protection advised. Patient to schedule podiatry  appointment re: osteomyelitis.              X-Ray Toe 2 or More Views Right  Narrative: EXAMINATION:  XR TOE 2 OR MORE VIEWS RIGHT    CLINICAL HISTORY:  Non-pressure chronic ulcer of other part of right foot limited to breakdown of skin    TECHNIQUE:  Three views of the right toes were performed    COMPARISON:  None    FINDINGS:  Osteopenia.  Present postsurgical changes of 2nd toe D IP disarticulation.  Radiographic abnormalities at the 2nd toe middle phalanx, proximal phalangeal head and neck and PIP joint compatible with osteomyelitis and septic arthritis.  Regional soft tissue swelling.  Impression: As above    Electronically signed by: Ruma Walker  Date:    12/21/2024  Time:    17:54                Hemoglobin A1C   Date Value Ref Range Status   11/04/2024 8.2 (H) 4.0 - 5.6 % Final     Comment:     ADA Screening Guidelines:  5.7-6.4%  Consistent with prediabetes  >or=6.5%  Consistent with diabetes    High levels of fetal hemoglobin interfere with the HbA1C  assay. Heterozygous hemoglobin variants (HbS, HgC, etc)do  not significantly interfere with this assay.   However, presence of multiple variants may affect accuracy.     09/15/2023 8.8 (H) 4.0 - 5.6 % Final     Comment:     ADA Screening Guidelines:  5.7-6.4%  Consistent with prediabetes  >or=6.5%  Consistent with diabetes    High levels of fetal hemoglobin interfere with the HbA1C  assay. Heterozygous hemoglobin variants (HbS, HgC, etc)do  not significantly interfere with this assay.   However, presence of multiple variants may affect accuracy.     06/15/2023 10.7 (H) 4.0 - 5.6 % Final     Comment:     ADA Screening Guidelines:  5.7-6.4%  Consistent with prediabetes  >or=6.5%  Consistent with diabetes    High levels of fetal hemoglobin interfere with the HbA1C  assay. Heterozygous hemoglobin variants (HbS, HgC, etc)do  not significantly interfere with this assay.   However, presence of multiple variants may affect accuracy.          Past Medical  "History:   Diagnosis Date    Anxiety disorder 2019    Ascites 2019    Cirrhosis     Depression 2019    Diabetes mellitus     History of cellulitis 2019    Hyponatremia 2019    Portal hypertensive gastropathy 2019    On EGD     Type 2 diabetes mellitus 2019     Past Surgical History:   Procedure Laterality Date     SECTION  ,     COLONOSCOPY      ESOPHAGOGASTRODUODENOSCOPY N/A 2020    Procedure: EGD (ESOPHAGOGASTRODUODENOSCOPY);  Surgeon: Ayden Bernal MD;  Location: [facility] ENDO [address]);  Service: Endoscopy;  Laterality: N/A;  labs prior    EYE SURGERY      cosmetic    HERNIA REPAIR      umbilical    PERITONEOCENTESIS N/A 10/21/2019    Procedure: PARACENTESIS, ABDOMINAL;  Surgeon: Israel Hidalgo MD;  Location: [facility] CATH LAB;  Service: Radiology;  Laterality: N/A;    TONSILLECTOMY  1972    TUBAL LIGATION      tummy tuck       Current Outpatient Medications on File Prior to Visit   Medication Sig Dispense Refill    ciprofloxacin HCl (CIPRO) 500 MG tablet Take 1 tablet (500 mg total) by mouth every 12 (twelve) hours. GI protection: probiotic for 14 days 28 tablet 0    cyanocobalamin (VITAMIN B-12) 100 MCG tablet Take 100 mcg by mouth once daily.      DULoxetine (CYMBALTA) 60 MG capsule TAKE 1 CAPSULE (60 MG TOTAL) BY MOUTH ONCE DAILY. 90 capsule 3    folic acid (FOLVITE) 1 MG tablet Take 1 tablet (1,000 mcg total) by mouth once daily. 30 tablet 8    gabapentin (NEURONTIN) 300 MG capsule Take 1 capsule at breakfast, 1 capsule at lunch, and 3 capsules at bedtime. 150 capsule 3    insulin glargine, TOUJEO, (TOUJEO SOLOSTAR U-300 INSULIN) 300 unit/mL (1.5 mL) InPn pen Inject 40 Units into the skin every morning AND 20 Units every evening. 4.5 mL 4    multivit with minerals/lutein (MULTIVITAMIN 50 PLUS ORAL) Take by mouth once daily.      pen needle, diabetic 30 gauge x 1/3" Ndle NovoFine 30 30 gauge x 1/3" needle 50 each 3    spironolactone (ALDACTONE) " "100 MG tablet Take 2 tablets (200 mg total) by mouth once daily. 60 tablet 3    thiamine 100 MG tablet Take 100 mg by mouth once daily.      tirzepatide (MOUNJARO) 5 mg/0.5 mL PnIj Inject 5 mg into the skin every 7 days. 4 Pen 0     No current facility-administered medications on file prior to visit.     Review of patient's allergies indicates:   Allergen Reactions    Penicillins      Rash as a child       Review of Systems   Constitutional: Negative for chills, decreased appetite, fever, malaise/fatigue, night sweats, weight gain and weight loss.   Cardiovascular:  Negative for chest pain, claudication, dyspnea on exertion, leg swelling, palpitations and syncope.   Respiratory:  Negative for cough and shortness of breath.    Endocrine: Negative for cold intolerance and heat intolerance.   Hematologic/Lymphatic: Negative for bleeding problem. Does not bruise/bleed easily.   Skin:  Positive for dry skin. Negative for color change, flushing, itching, nail changes, poor wound healing, rash, skin cancer, suspicious lesions and unusual hair distribution.   Musculoskeletal:  Negative for arthritis, back pain, falls, gout, joint pain, joint swelling, muscle cramps, muscle weakness, myalgias, neck pain and stiffness.   Gastrointestinal:  Negative for diarrhea, nausea and vomiting.   Neurological:  Positive for numbness and paresthesias. Negative for dizziness, focal weakness, light-headedness, tremors, vertigo and weakness.   Psychiatric/Behavioral:  Negative for altered mental status and depression. The patient does not have insomnia.    Allergic/Immunologic: Negative.            Objective:       Vitals:    12/30/24 1259   BP: 120/83   Pulse: (!) 118   Weight: 84 kg (185 lb 3 oz)   Height: 5' 4" (1.626 m)   PainSc: 0-No pain   84 kg (185 lb 3 oz)     Physical Exam  Vitals reviewed.   Constitutional:       General: She is not in acute distress.     Appearance: She is well-developed. She is not ill-appearing, toxic-appearing " or diaphoretic.      Comments:        Cardiovascular:      Pulses:           Dorsalis pedis pulses are 1+ on the right side and 1+ on the left side.        Posterior tibial pulses are 1+ on the right side and 1+ on the left side.   Musculoskeletal:         General: No tenderness.      Right lower le+ Edema (2nd digit) present.      Left lower leg: No edema.      Right ankle: Normal.      Right Achilles Tendon: Normal.      Left ankle: Normal.      Left Achilles Tendon: Normal.      Right foot: Deformity present. No tenderness or bony tenderness.      Left foot: Deformity present. No tenderness or bony tenderness.      Comments: No pop    Feet:      Right foot:      Protective Sensation: 10 sites tested.  0 sites sensed.      Skin integrity: Ulcer, skin breakdown and dry skin present. No blister, erythema, warmth or callus.      Toenail Condition: Right toenails are abnormally thick.      Left foot:      Protective Sensation: 10 sites tested.  0 sites sensed.      Skin integrity: Dry skin present. No ulcer, blister, skin breakdown, erythema, warmth or callus.      Toenail Condition: Left toenails are abnormally thick.      Comments: Fissure sulcus right 2nd digit with distal 2nd digit eschar/ no active drainage    No flucutance   Skin:     General: Skin is warm.      Capillary Refill: Capillary refill takes 2 to 3 seconds.      Coloration: Skin is not pale.      Findings: No erythema or rash.   Neurological:      Mental Status: She is alert and oriented to person, place, and time.      Sensory: No sensory deficit.      Gait: Gait is intact.   Psychiatric:         Attention and Perception: Attention normal.         Mood and Affect: Mood normal.         Speech: Speech normal.         Behavior: Behavior normal.         Thought Content: Thought content normal.         Cognition and Memory: Cognition normal.         Judgment: Judgment normal.                     Assessment:       Encounter Diagnoses   Name Primary?     Type 2 diabetes mellitus with diabetic polyneuropathy, with long-term current use of insulin Yes    Osteomyelitis of second toe of right foot          Plan:       Brunilda was seen today for wound care and diabetes mellitus.    Diagnoses and all orders for this visit:    Type 2 diabetes mellitus with diabetic polyneuropathy, with long-term current use of insulin    Osteomyelitis of second toe of right foot      I counseled the patient on her conditions, their implications and medical management.    Discussed with patient x-rays are extremely concerning for osteomyelitis    Recommend bone biopsy with long-term antibiotics / ID consult  versus amputation  Pt would like to avoid amp    Betadine  Gauze tape    No evidence of vascular compromise    Patient will need better A1c control    Surgical shoe dispensed when not driving    Finish oral antibiotics urgent care    Will set patient up for bone biopsy with Dr. Davalos for Thursday

## 2024-12-31 ENCOUNTER — TELEPHONE (OUTPATIENT)
Dept: PODIATRY | Facility: CLINIC | Age: 59
End: 2024-12-31
Payer: COMMERCIAL

## 2024-12-31 NOTE — TELEPHONE ENCOUNTER
I called pt to clarify she knows about the appt with Dr. Davalos 1/2/24 at 11:45am.   Pt does not check portal.    Pt relate she feels good. No new c/o.

## 2025-01-02 ENCOUNTER — PROCEDURE VISIT (OUTPATIENT)
Dept: PODIATRY | Facility: CLINIC | Age: 60
End: 2025-01-02
Payer: COMMERCIAL

## 2025-01-02 VITALS
DIASTOLIC BLOOD PRESSURE: 96 MMHG | SYSTOLIC BLOOD PRESSURE: 147 MMHG | HEIGHT: 64 IN | BODY MASS INDEX: 31.79 KG/M2 | HEART RATE: 106 BPM

## 2025-01-02 DIAGNOSIS — M86.9 OSTEOMYELITIS OF SECOND TOE OF RIGHT FOOT: Primary | ICD-10-CM

## 2025-01-02 PROCEDURE — 87206 SMEAR FLUORESCENT/ACID STAI: CPT | Performed by: STUDENT IN AN ORGANIZED HEALTH CARE EDUCATION/TRAINING PROGRAM

## 2025-01-02 PROCEDURE — 87075 CULTR BACTERIA EXCEPT BLOOD: CPT | Performed by: STUDENT IN AN ORGANIZED HEALTH CARE EDUCATION/TRAINING PROGRAM

## 2025-01-02 PROCEDURE — 87102 FUNGUS ISOLATION CULTURE: CPT | Performed by: STUDENT IN AN ORGANIZED HEALTH CARE EDUCATION/TRAINING PROGRAM

## 2025-01-02 PROCEDURE — 87116 MYCOBACTERIA CULTURE: CPT | Performed by: STUDENT IN AN ORGANIZED HEALTH CARE EDUCATION/TRAINING PROGRAM

## 2025-01-02 PROCEDURE — 87186 SC STD MICRODIL/AGAR DIL: CPT | Performed by: STUDENT IN AN ORGANIZED HEALTH CARE EDUCATION/TRAINING PROGRAM

## 2025-01-02 PROCEDURE — 99213 OFFICE O/P EST LOW 20 MIN: CPT | Mod: 25,S$GLB,, | Performed by: STUDENT IN AN ORGANIZED HEALTH CARE EDUCATION/TRAINING PROGRAM

## 2025-01-02 PROCEDURE — 87205 SMEAR GRAM STAIN: CPT | Performed by: STUDENT IN AN ORGANIZED HEALTH CARE EDUCATION/TRAINING PROGRAM

## 2025-01-02 PROCEDURE — 20220 BONE BIOPSY TROCAR/NDL SUPFC: CPT | Mod: S$GLB,,, | Performed by: STUDENT IN AN ORGANIZED HEALTH CARE EDUCATION/TRAINING PROGRAM

## 2025-01-02 PROCEDURE — 87070 CULTURE OTHR SPECIMN AEROBIC: CPT | Performed by: STUDENT IN AN ORGANIZED HEALTH CARE EDUCATION/TRAINING PROGRAM

## 2025-01-02 PROCEDURE — 87077 CULTURE AEROBIC IDENTIFY: CPT | Performed by: STUDENT IN AN ORGANIZED HEALTH CARE EDUCATION/TRAINING PROGRAM

## 2025-01-02 NOTE — PROGRESS NOTES
Subjective:     Patient    Brunilda Delgadillo is a 59 y.o. female.    Problem    Referred by Dr Devlin for suspected osteomyelitis right 2nd toe. Patient picks at her skin regularly which she thinks may have contributed to a wound, infection, and enlargement of the right 2nd toe. Has been going on for months, slowly worsening. Started on Bactrim + mupirocin per Dr Devlin; X rays done which were concerning for 2nd toe OM. Minimal pain in the toe, patient has extensive neuropathy.     Primary Care Provider    Primary Care Provider: Vishal Sanchez MD   Last Seen: 10/29/2024     History    History obtained from patient and review of medical records.     Past Medical History:   Diagnosis Date    Anxiety disorder 2019    Ascites 2019    Cirrhosis     Depression 2019    Diabetes mellitus     History of cellulitis 2019    Hyponatremia 2019    Portal hypertensive gastropathy 2019    On EGD     Type 2 diabetes mellitus 2019       Past Surgical History:   Procedure Laterality Date     SECTION  ,     COLONOSCOPY      ESOPHAGOGASTRODUODENOSCOPY N/A 2020    Procedure: EGD (ESOPHAGOGASTRODUODENOSCOPY);  Surgeon: Ayden Bernal MD;  Location: [address]);  Service: Endoscopy;  Laterality: N/A;  labs prior    EYE SURGERY      cosmetic    HERNIA REPAIR      umbilical    PERITONEOCENTESIS N/A 10/21/2019    Procedure: PARACENTESIS, ABDOMINAL;  Surgeon: Israel Hidalgo MD;  Location: [facility] CATH LAB;  Service: Radiology;  Laterality: N/A;    TONSILLECTOMY  1972    TUBAL LIGATION      tummy tuck          Objective:     Vitals  Wt Readings from Last 1 Encounters:   24 84 kg (185 lb 3 oz)     Temp Readings from Last 1 Encounters:   24 96.4 °F (35.8 °C) (Temporal)     BP Readings from Last 1 Encounters:   25 (!) 147/96     Pulse Readings from Last 1 Encounters:   25 106       Dermatological Exam    Skin:  Pedal hair growth  diminished and Pedal skin thin and shiny on right; peeling skin, postulcerative callus tip of 2nd toe  Pedal hair growth diminished and Pedal skin thin and shiny on left; peeling skin     Nails:  10 nail(s) thickened    Vascular Exam    Arteries:  Posterior tibial artery palpable on right  Dorsalis pedis artery palpable on right  Posterior tibial artery palpable on left  Dorsalis pedis artery palpable on left    Veins:  Superficial veins unremarkable on right  Superficial veins unremarkable on left    Swellin+ pitting on right; 3+ at 2nd toe  1+ pitting on left    Neurological Exam    [place] touch test:  0/6 sites sensed, loss of protective sensation     Musculoskeletal Exam    Footwear:  Surgical shoe on right  Casual on left    Gait Exam:   Ambulatory Status: Ambulatory  Gait: Apropulsive  Assistive Devices: None    Foot Progression Angle:  Normal on right  Normal on left     Right Lower Extremity Additional Findings:  2nd toe chronically swollen; mildly tender  Right foot and ankle function, strength, and range of motion unremarkable except as noted above.     Left Lower Extremity Additional Findings:  Left foot and ankle function, strength, and range of motion unremarkable except as noted above.    Imaging and Other Tests    Imaging:  Independently reviewed and interpreted imaging, findings are as follows:     24 right toe X rays: 2nd distal phalanx completely eroded with middle phalanx partially eroded; consistent  with deep infection    Other Tests: The following A1c results were reviewed.   Hemoglobin A1C   Date Value Ref Range Status   2024 8.2 (H) 4.0 - 5.6 % Final     Comment:     ADA Screening Guidelines:  5.7-6.4%  Consistent with prediabetes  >or=6.5%  Consistent with diabetes    High levels of fetal hemoglobin interfere with the HbA1C  assay. Heterozygous hemoglobin variants (HbS, HgC, etc)do  not significantly interfere with this assay.   However, presence of multiple variants may  affect accuracy.     09/15/2023 8.8 (H) 4.0 - 5.6 % Final     Comment:     ADA Screening Guidelines:  5.7-6.4%  Consistent with prediabetes  >or=6.5%  Consistent with diabetes    High levels of fetal hemoglobin interfere with the HbA1C  assay. Heterozygous hemoglobin variants (HbS, HgC, etc)do  not significantly interfere with this assay.   However, presence of multiple variants may affect accuracy.     06/15/2023 10.7 (H) 4.0 - 5.6 % Final     Comment:     ADA Screening Guidelines:  5.7-6.4%  Consistent with prediabetes  >or=6.5%  Consistent with diabetes    High levels of fetal hemoglobin interfere with the HbA1C  assay. Heterozygous hemoglobin variants (HbS, HgC, etc)do  not significantly interfere with this assay.   However, presence of multiple variants may affect accuracy.           Assessment:     Encounter Diagnosis   Name Primary?    Osteomyelitis of second toe of right foot Yes        Plan:     I counseled the patient on her conditions, their implications and medical management.    Diabetic foot with peripheral neuropathy: chronic stable   -Performed shoe inspection and diabetic foot education. Reviewed importance of blood glucose control, proper nutrition, and foot hygiene to minimize risk of complications of diabetes. Recommended daily foot inspections, daily moisturizer to feet, avoiding sharp instruments to feet, appropriate footwear at all times when ambulating, and following up regularly for routine foot care.   -Diabetic foot risk: 2023 IWGDF high ulcer risk.    Right 2nd toe osteomyelitis: chronic stable  -Discussed treatment options including bone biopsy vs amputation; Reviewed potential risks, benefits, alternatives. Patient opted for bone biopsy right 2nd toe, consent obtained. After time out was performed, the procedure site was marked and the patient was prepped aseptically. 5 mL 2% lidocaine plain used to perform digital block. Percutaneous bone biopsy of right 2nd toe middle phalanx  performed using biopsy needle, 2 specimens obtained, 1 for culture and 1 for pathology.  Dressed with xeroform, gauze, coban. Tolerated well.   -Continue bactrim for now.   -Surgical shoe RLE WBAT.       Return to clinic in 2 weeks, call sooner PRN.

## 2025-01-03 LAB
ACID FAST MOD KINY STN SPEC: NORMAL
GRAM STN SPEC: NORMAL
GRAM STN SPEC: NORMAL
MYCOBACTERIUM SPEC QL CULT: NORMAL

## 2025-01-04 LAB
BACTERIA SPEC AEROBE CULT: ABNORMAL
BACTERIA SPEC ANAEROBE CULT: NORMAL

## 2025-01-06 ENCOUNTER — TELEPHONE (OUTPATIENT)
Dept: PODIATRY | Facility: CLINIC | Age: 60
End: 2025-01-06
Payer: COMMERCIAL

## 2025-01-06 DIAGNOSIS — M86.9 OSTEOMYELITIS OF SECOND TOE OF RIGHT FOOT: Primary | ICD-10-CM

## 2025-01-06 RX ORDER — DOXYCYCLINE 100 MG/1
100 CAPSULE ORAL 2 TIMES DAILY
Qty: 42 CAPSULE | Refills: 0 | Status: SHIPPED | OUTPATIENT
Start: 2025-01-06 | End: 2025-01-27

## 2025-01-07 ENCOUNTER — TELEPHONE (OUTPATIENT)
Dept: PODIATRY | Facility: CLINIC | Age: 60
End: 2025-01-07
Payer: COMMERCIAL

## 2025-01-07 NOTE — TELEPHONE ENCOUNTER
----- Message from Hilario Davalos DPM sent at 1/7/2025  1:16 PM CST -----  Sent this message to Lyubov before but didn't see a note that she reached out.     Please let patient know she does have bone infection based on the samples we got. I sent an antibiotic (doxycycline) to [facility] pharmacy. She will need 6 weeks of antibiotics, the ones she was on did not work for this so she needs 6 weeks starting now. I'm sending 3 weeks of the antibiotic, if she tolerates it well over that 3 weeks we will extend it out an additional 3 weeks. Followup as scheduled. -Tino

## 2025-01-08 ENCOUNTER — TELEPHONE (OUTPATIENT)
Dept: PODIATRY | Facility: CLINIC | Age: 60
End: 2025-01-08
Payer: COMMERCIAL

## 2025-01-08 NOTE — TELEPHONE ENCOUNTER
Patient stated she has picked up first round of antibiotics          ----- Message from Hilario Davalos DPM sent at 1/6/2025 12:32 PM CST -----  Please let patient know I sent an antibiotic (doxycycline) to [facility] pharmacy. She will need 6 weeks of antibiotics, the ones she was on did not work for this so she needs 6 weeks starting now. I'm sending 3 weeks of the antibiotic, if she tolerates it well over that 3 weeks we will extend it out an additional 3 weeks. Followup as scheduled. -Tino

## 2025-01-16 ENCOUNTER — OFFICE VISIT (OUTPATIENT)
Dept: PODIATRY | Facility: CLINIC | Age: 60
End: 2025-01-16
Payer: COMMERCIAL

## 2025-01-16 VITALS
DIASTOLIC BLOOD PRESSURE: 82 MMHG | HEART RATE: 100 BPM | WEIGHT: 185.19 LBS | BODY MASS INDEX: 31.62 KG/M2 | HEIGHT: 64 IN | SYSTOLIC BLOOD PRESSURE: 113 MMHG | RESPIRATION RATE: 18 BRPM

## 2025-01-16 DIAGNOSIS — L97.512 ULCER OF RIGHT FOOT WITH FAT LAYER EXPOSED: ICD-10-CM

## 2025-01-16 DIAGNOSIS — M86.9 OSTEOMYELITIS OF SECOND TOE OF RIGHT FOOT: Primary | ICD-10-CM

## 2025-01-16 PROCEDURE — 1159F MED LIST DOCD IN RCRD: CPT | Mod: CPTII,S$GLB,, | Performed by: STUDENT IN AN ORGANIZED HEALTH CARE EDUCATION/TRAINING PROGRAM

## 2025-01-16 PROCEDURE — 3079F DIAST BP 80-89 MM HG: CPT | Mod: CPTII,S$GLB,, | Performed by: STUDENT IN AN ORGANIZED HEALTH CARE EDUCATION/TRAINING PROGRAM

## 2025-01-16 PROCEDURE — 99999 PR PBB SHADOW E&M-EST. PATIENT-LVL III: CPT | Mod: PBBFAC,,, | Performed by: STUDENT IN AN ORGANIZED HEALTH CARE EDUCATION/TRAINING PROGRAM

## 2025-01-16 PROCEDURE — 3074F SYST BP LT 130 MM HG: CPT | Mod: CPTII,S$GLB,, | Performed by: STUDENT IN AN ORGANIZED HEALTH CARE EDUCATION/TRAINING PROGRAM

## 2025-01-16 PROCEDURE — 3008F BODY MASS INDEX DOCD: CPT | Mod: CPTII,S$GLB,, | Performed by: STUDENT IN AN ORGANIZED HEALTH CARE EDUCATION/TRAINING PROGRAM

## 2025-01-16 PROCEDURE — 99213 OFFICE O/P EST LOW 20 MIN: CPT | Mod: S$GLB,,, | Performed by: STUDENT IN AN ORGANIZED HEALTH CARE EDUCATION/TRAINING PROGRAM

## 2025-01-16 RX ORDER — DULOXETIN HYDROCHLORIDE 60 MG/1
60 CAPSULE, DELAYED RELEASE ORAL 2 TIMES DAILY
Qty: 60 CAPSULE | Refills: 11 | Status: SHIPPED | OUTPATIENT
Start: 2025-01-16 | End: 2026-01-16

## 2025-01-16 RX ORDER — DOXYCYCLINE 100 MG/1
100 CAPSULE ORAL 2 TIMES DAILY
Qty: 42 CAPSULE | Refills: 0 | Status: SHIPPED | OUTPATIENT
Start: 2025-01-28 | End: 2025-01-29 | Stop reason: SDUPTHER

## 2025-01-16 NOTE — PROGRESS NOTES
Subjective:     Patient    Brunilda Delgadillo is a 59 y.o. female.    Problem    25: Referred by Dr Devlin for suspected osteomyelitis right 2nd toe. Patient picks at her skin regularly which she thinks may have contributed to a wound, infection, and enlargement of the right 2nd toe. Has been going on for months, slowly worsening. Started on Bactrim + mupirocin per Dr Devlin; X rays done which were concerning for 2nd toe OM. Minimal pain in the toe, patient has extensive neuropathy.     25: Returns for followup of right 2nd toe osteomyelitis, confirmed by biopsy. Started 3 weeks doxycycline , estimated end date . Has numbness, tingling, burning, crampig, electrical feelings, hot, cold, shooting. Has right 2nd toe plantar wound which she is managing herself, admits to picking at the skin.     Primary Care Provider    Primary Care Provider: Vishal Sanchez MD   Last Seen: 10/29/2024     History    History obtained from patient and review of medical records.     Past Medical History:   Diagnosis Date    Anxiety disorder 2019    Ascites 2019    Cirrhosis     Depression 2019    Diabetes mellitus     History of cellulitis 2019    Hyponatremia 2019    Portal hypertensive gastropathy 2019    On EGD     Type 2 diabetes mellitus 2019       Past Surgical History:   Procedure Laterality Date     SECTION  ,     COLONOSCOPY      ESOPHAGOGASTRODUODENOSCOPY N/A 2020    Procedure: EGD (ESOPHAGOGASTRODUODENOSCOPY);  Surgeon: Ayden Bernal MD;  Location: [address]);  Service: Endoscopy;  Laterality: N/A;  labs prior    EYE SURGERY      cosmetic    HERNIA REPAIR  2006    umbilical    PERITONEOCENTESIS N/A 10/21/2019    Procedure: PARACENTESIS, ABDOMINAL;  Surgeon: Israel Hidalgo MD;  Location: [facility], [place], operated by [facility] CATH LAB;  Service: Radiology;  Laterality: N/A;    TONSILLECTOMY  1972    TUBAL LIGATION      tummy tuck          Objective:      Vitals  Wt Readings from Last 1 Encounters:   25 84 kg (185 lb 3 oz)     Temp Readings from Last 1 Encounters:   24 96.4 °F (35.8 °C) (Temporal)     BP Readings from Last 1 Encounters:   25 113/82     Pulse Readings from Last 1 Encounters:   25 100       Dermatological Exam    Skin:  Pedal hair growth diminished and Pedal skin thin and shiny on right; peeling skin, ulcer to fat at 2nd toe plantar sulcus  Pedal hair growth diminished and Pedal skin thin and shiny on left; peeling skin     Nails:  10 nail(s) thickened    Vascular Exam    Arteries:  Posterior tibial artery palpable on right  Dorsalis pedis artery palpable on right  Posterior tibial artery palpable on left  Dorsalis pedis artery palpable on left    Veins:  Superficial veins unremarkable on right  Superficial veins unremarkable on left    Swellin+ pitting on right; 2+ at 2nd toe  1+ pitting on left    Neurological Exam    [place] touch test:  0/6 sites sensed, loss of protective sensation     Musculoskeletal Exam    Footwear:  Surgical shoe on right  Casual on left    Gait Exam:   Ambulatory Status: Ambulatory  Gait: Apropulsive  Assistive Devices: None    Foot Progression Angle:  Normal on right  Normal on left     Right Lower Extremity Additional Findings:  2nd toe chronically swollen; no longer tender  Right foot and ankle function, strength, and range of motion unremarkable except as noted above.     Left Lower Extremity Additional Findings:  Left foot and ankle function, strength, and range of motion unremarkable except as noted above.    Imaging and Other Tests    Imagin24 right toe X rays: 2nd distal phalanx completely eroded with middle phalanx partially eroded; consistent  with deep infection    Independently reviewed and interpreted imaging, findings are as follows: N/A    Other Tests: The following A1c results were reviewed.   Hemoglobin A1C   Date Value Ref Range Status   2024 8.2 (H) 4.0 - 5.6  % Final     Comment:     ADA Screening Guidelines:  5.7-6.4%  Consistent with prediabetes  >or=6.5%  Consistent with diabetes    High levels of fetal hemoglobin interfere with the HbA1C  assay. Heterozygous hemoglobin variants (HbS, HgC, etc)do  not significantly interfere with this assay.   However, presence of multiple variants may affect accuracy.     09/15/2023 8.8 (H) 4.0 - 5.6 % Final     Comment:     ADA Screening Guidelines:  5.7-6.4%  Consistent with prediabetes  >or=6.5%  Consistent with diabetes    High levels of fetal hemoglobin interfere with the HbA1C  assay. Heterozygous hemoglobin variants (HbS, HgC, etc)do  not significantly interfere with this assay.   However, presence of multiple variants may affect accuracy.     06/15/2023 10.7 (H) 4.0 - 5.6 % Final     Comment:     ADA Screening Guidelines:  5.7-6.4%  Consistent with prediabetes  >or=6.5%  Consistent with diabetes    High levels of fetal hemoglobin interfere with the HbA1C  assay. Heterozygous hemoglobin variants (HbS, HgC, etc)do  not significantly interfere with this assay.   However, presence of multiple variants may affect accuracy.           Assessment:     Encounter Diagnoses   Name Primary?    Osteomyelitis of second toe of right foot Yes    Ulcer of right foot with fat layer exposed           Plan:     I counseled the patient on her conditions, their implications and medical management.    Diabetic foot with peripheral neuropathy: chronic stable   -Performed shoe inspection and diabetic foot education. Reviewed importance of blood glucose control, proper nutrition, and foot hygiene to minimize risk of complications of diabetes. Recommended daily foot inspections, daily moisturizer to feet, avoiding sharp instruments to feet, appropriate footwear at all times when ambulating, and following up regularly for routine foot care.   -Diabetic foot risk: 2023 IWGDF high ulcer risk.    Right 2nd toe osteomyelitis: chronic stable  -Complete 6  weeks PO antibiotics.     Right 2nd toe ulcer: subacute  -Toeball dressing applied.        Return to clinic in 2 weeks, call sooner PRN.

## 2025-01-29 ENCOUNTER — OFFICE VISIT (OUTPATIENT)
Dept: PODIATRY | Facility: CLINIC | Age: 60
End: 2025-01-29
Payer: COMMERCIAL

## 2025-01-29 VITALS
HEART RATE: 103 BPM | DIASTOLIC BLOOD PRESSURE: 73 MMHG | SYSTOLIC BLOOD PRESSURE: 115 MMHG | WEIGHT: 186.5 LBS | HEIGHT: 64 IN | BODY MASS INDEX: 31.84 KG/M2

## 2025-01-29 DIAGNOSIS — L97.512 ULCER OF RIGHT FOOT WITH FAT LAYER EXPOSED: ICD-10-CM

## 2025-01-29 DIAGNOSIS — M86.9 OSTEOMYELITIS OF SECOND TOE OF RIGHT FOOT: Primary | ICD-10-CM

## 2025-01-29 PROCEDURE — 1159F MED LIST DOCD IN RCRD: CPT | Mod: CPTII,S$GLB,, | Performed by: STUDENT IN AN ORGANIZED HEALTH CARE EDUCATION/TRAINING PROGRAM

## 2025-01-29 PROCEDURE — 99999 PR PBB SHADOW E&M-EST. PATIENT-LVL III: CPT | Mod: PBBFAC,,, | Performed by: STUDENT IN AN ORGANIZED HEALTH CARE EDUCATION/TRAINING PROGRAM

## 2025-01-29 PROCEDURE — 3078F DIAST BP <80 MM HG: CPT | Mod: CPTII,S$GLB,, | Performed by: STUDENT IN AN ORGANIZED HEALTH CARE EDUCATION/TRAINING PROGRAM

## 2025-01-29 PROCEDURE — 3074F SYST BP LT 130 MM HG: CPT | Mod: CPTII,S$GLB,, | Performed by: STUDENT IN AN ORGANIZED HEALTH CARE EDUCATION/TRAINING PROGRAM

## 2025-01-29 PROCEDURE — 3008F BODY MASS INDEX DOCD: CPT | Mod: CPTII,S$GLB,, | Performed by: STUDENT IN AN ORGANIZED HEALTH CARE EDUCATION/TRAINING PROGRAM

## 2025-01-29 PROCEDURE — 99213 OFFICE O/P EST LOW 20 MIN: CPT | Mod: S$GLB,,, | Performed by: STUDENT IN AN ORGANIZED HEALTH CARE EDUCATION/TRAINING PROGRAM

## 2025-01-29 RX ORDER — DOXYCYCLINE 100 MG/1
100 CAPSULE ORAL 2 TIMES DAILY
Qty: 42 CAPSULE | Refills: 0 | Status: SHIPPED | OUTPATIENT
Start: 2025-01-29

## 2025-01-29 NOTE — PROGRESS NOTES
Subjective:     Patient    Brunilda Delgadillo is a 59 y.o. female.    Problem    25: Referred by Dr Devlin for suspected osteomyelitis right 2nd toe. Patient picks at her skin regularly which she thinks may have contributed to a wound, infection, and enlargement of the right 2nd toe. Has been going on for months, slowly worsening. Started on Bactrim + mupirocin per Dr Devlin; X rays done which were concerning for 2nd toe OM. Minimal pain in the toe, patient has extensive neuropathy.     25: Returns for followup of right 2nd toe osteomyelitis, confirmed by biopsy. Started 3 weeks doxycycline , estimated end date . Has numbness, tingling, burning, crampig, electrical feelings, hot, cold, shooting. Has right 2nd toe plantar wound which she is managing herself, admits to picking at the skin.     25: Returns for followup of right 2nd toe osteomyelitis with distal and plantar 2nd toe wounds. Applied toeball at last visit and increased duloxetine to 120 mg/day for ongoing nerve symptoms. Extended antibiotics an additional 3 weeks at last visit but patient did not pick them up and start them. Had significant improvement in nerve symptoms with duloxetine increase.     Primary Care Provider    Primary Care Provider: Vishal Sanchez MD   Last Seen: 10/29/2024     History    History obtained from patient and review of medical records.     Past Medical History:   Diagnosis Date    Anxiety disorder 2019    Ascites 2019    Cirrhosis     Depression 2019    Diabetes mellitus     History of cellulitis 2019    Hyponatremia 2019    Portal hypertensive gastropathy 2019    On EGD     Type 2 diabetes mellitus 2019       Past Surgical History:   Procedure Laterality Date     SECTION  ,     COLONOSCOPY      ESOPHAGOGASTRODUODENOSCOPY N/A 2020    Procedure: EGD (ESOPHAGOGASTRODUODENOSCOPY);  Surgeon: Ayden Bernal MD;  Location: [address]  [facility]);  Service: Endoscopy;  Laterality: N/A;  labs prior    EYE SURGERY      cosmetic    HERNIA REPAIR  2006    umbilical    PERITONEOCENTESIS N/A 10/21/2019    Procedure: PARACENTESIS, ABDOMINAL;  Surgeon: Israel Hidalgo MD;  Location: [facility] CATH LAB;  Service: Radiology;  Laterality: N/A;    TONSILLECTOMY  1972    TUBAL LIGATION      tummy tuck          Objective:     Vitals  Wt Readings from Last 1 Encounters:   25 84.6 kg (186 lb 8.2 oz)     Temp Readings from Last 1 Encounters:   24 96.4 °F (35.8 °C) (Temporal)     BP Readings from Last 1 Encounters:   25 115/73     Pulse Readings from Last 1 Encounters:   25 103       Dermatological Exam    Skin:  Pedal hair growth diminished and Pedal skin thin and shiny on right; peeling skin, ulcer to fat at 2nd toe plantar sulcus slowly improving  Pedal hair growth diminished and Pedal skin thin and shiny on left; peeling skin     Nails:  10 nail(s) thickened    Vascular Exam    Arteries:  Posterior tibial artery palpable on right  Dorsalis pedis artery palpable on right  Posterior tibial artery palpable on left  Dorsalis pedis artery palpable on left    Veins:  Superficial veins unremarkable on right  Superficial veins unremarkable on left    Swellin+ pitting on right; 2+ at 2nd toe  1+ pitting on left    Neurological Exam    [place] touch test:  0/6 sites sensed, loss of protective sensation     Musculoskeletal Exam    Footwear:  Surgical shoe on right  Casual on left    Gait Exam:   Ambulatory Status: Ambulatory  Gait: Apropulsive  Assistive Devices: None    Foot Progression Angle:  Normal on right  Normal on left     Right Lower Extremity Additional Findings:  2nd toe chronically swollen; no longer tender  Right foot and ankle function, strength, and range of motion unremarkable except as noted above.     Left Lower Extremity Additional Findings:  Left foot and ankle function, strength, and range of motion unremarkable except as  noted above.    Imaging and Other Tests    Imagin24 right toe X rays: 2nd distal phalanx completely eroded with middle phalanx partially eroded; consistent  with deep infection    Independently reviewed and interpreted imaging, findings are as follows: N/A    Other Tests: The following A1c results were reviewed.   Hemoglobin A1C   Date Value Ref Range Status   2024 8.2 (H) 4.0 - 5.6 % Final     Comment:     ADA Screening Guidelines:  5.7-6.4%  Consistent with prediabetes  >or=6.5%  Consistent with diabetes    High levels of fetal hemoglobin interfere with the HbA1C  assay. Heterozygous hemoglobin variants (HbS, HgC, etc)do  not significantly interfere with this assay.   However, presence of multiple variants may affect accuracy.     09/15/2023 8.8 (H) 4.0 - 5.6 % Final     Comment:     ADA Screening Guidelines:  5.7-6.4%  Consistent with prediabetes  >or=6.5%  Consistent with diabetes    High levels of fetal hemoglobin interfere with the HbA1C  assay. Heterozygous hemoglobin variants (HbS, HgC, etc)do  not significantly interfere with this assay.   However, presence of multiple variants may affect accuracy.     06/15/2023 10.7 (H) 4.0 - 5.6 % Final     Comment:     ADA Screening Guidelines:  5.7-6.4%  Consistent with prediabetes  >or=6.5%  Consistent with diabetes    High levels of fetal hemoglobin interfere with the HbA1C  assay. Heterozygous hemoglobin variants (HbS, HgC, etc)do  not significantly interfere with this assay.   However, presence of multiple variants may affect accuracy.           Assessment:     Encounter Diagnoses   Name Primary?    Osteomyelitis of second toe of right foot Yes    Ulcer of right foot with fat layer exposed             Plan:     I counseled the patient on her conditions, their implications and medical management.    Diabetic foot with peripheral neuropathy: chronic stable   -Performed shoe inspection and diabetic foot education. Reviewed importance of blood glucose  control, proper nutrition, and foot hygiene to minimize risk of complications of diabetes. Recommended daily foot inspections, daily moisturizer to feet, avoiding sharp instruments to feet, appropriate footwear at all times when ambulating, and following up regularly for routine foot care.   -Diabetic foot risk: 2023 IWGDF high ulcer risk.  -Continue duloxetine 120 mg/day.     Right 2nd toe osteomyelitis: chronic stable  -Complete 6 weeks PO antibiotics.     Right 2nd toe ulcer: subacute  -Toeball dressing applied.        Return to clinic in 2 weeks, call sooner PRN.

## 2025-02-07 NOTE — TELEPHONE ENCOUNTER
Care Due:                  Date            Visit Type   Department     Provider  --------------------------------------------------------------------------------                                EP -                              PRIMARY      [facility]  Last Visit: 10-      CARE ([facility])   Jenelle Sanchez                              EP -                              PRIMARY      [facility] Primary  Next Visit: 02-      CARE ([facility])   Jenelle Sanchez                                                            Last  Test          Frequency    Reason                     Performed    Due Date  --------------------------------------------------------------------------------    HBA1C.......  6 months...  TOUJEO, tirzepatide......  11- 05-    Health [facility] Embedded Care Due Messages. Reference number: [id number].   2/07/2025 3:56:09 PM CST

## 2025-02-07 NOTE — TELEPHONE ENCOUNTER
"----- Message from Jonathan sent at 2/7/2025  3:37 PM CST -----  Contact: [phone]@[email or web address]  Requesting an RX refill or new RX.pen needle, diabetic 30 gauge x 1/3" Ndle    Is this a refill or new RX: refill     RX name and strength (copy/paste from chart):      Is this a 30 day or 90 day RX:     Pharmacy name and phone #[facility] - [address]    The doctors have asked that we provide their patients with the following 2 reminders -- prescription refills can take up to 72 hours, and a friendly reminder that in the future you can use your [facility] account to request refills:  "

## 2025-02-12 DIAGNOSIS — E11.9 TYPE 2 DIABETES MELLITUS WITHOUT COMPLICATION: ICD-10-CM

## 2025-02-16 PROBLEM — C80.1 MALIGNANT NEOPLASTIC DISEASE: Status: ACTIVE | Noted: 2025-02-16

## 2025-02-17 NOTE — PROGRESS NOTES
[facility] Progress Note  2/20/2025          Reason for Visit:      had concerns including Follow-up (3 m), Medication Refill (gabapentin (NEURONTIN) 300 MG capsules/mounjaro 5 mg/), and Foot Pain.       History of Present Illness:       Patient presents today for three-month follow-up and medication refills    Type II Diabetes  Toujeo 20 in AM and 40 in PM  Mounjaro 2.5 weekly  Her A1C has been trending down from 10.7 to 8.8 to most recently 8.2. She recently obtained Mounjaro 2.5mg due to pharmacy availability issues, down from her usual 5mg dose. While on Mounjaro 5mg, she experienced mild, transient nausea without constipation. She takes Tresiba insulin 20mg in morning and 40mg at night, and denies any low blood sugars.  She sees Eye doctor on [facility] [address]- says she is up to date on eye exam.    - with neuropathy -   Gabapentin 300 (1 in am, 1 in afternoon, 3 at night).  Cymbalta 60 mg bid.  She reports improvement in neuropathy symptoms with recent medication adjustments. Cymbalta has been increased to 60mg twice daily, which she believes has made a positive difference in her symptoms. She also continues Gabapentin for neuropathy management.       RIGHT FOOT OSTEOMYELITIS:  She presents with infection in the second toe of right foot. X-ray showed bone involvement and bone biopsy confirmed active osteomyelitis. Cultures grew aerobic staph with sensitivity to tetracyclines. She is currently on a 6-week course of doxycycline.  She has follow up with podiatry this afternoon and there has been discussion of doing IV abx.      Alcoholic cirrhosis  She has a history of alcoholic cirrhosis and is currently taking spironolactone. She previously took multiple diuretics but discontinued two due to a significant drop in blood pressure.    - with portal Hypertensive gastropathyHas portal hypertensive gastropathy, but did not have varicies on most recent EGD per her note.   - with  "Thrombocytopenia- Pt has thrombocytopenia that is likely also related.  Pt says she is still occasionally drinking alcohol.   - with Ascites- spironolactone 200 daily   She does have history of ascites, and takes spironolactone.  Has had paracentesis in the past several times, but not in a long while.        MDD  Cymbalta 60 - recnetly increased by podiatry to bid  She reports improved mood with Cymbalta 60mg, indicating the medication has been effective in managing her symptoms.        PREVENTIVE CARE:  She declines colon cancer screening, mammogram, and vaccines including flu and pneumonia.           Problem List:     Problem List[1]      Medications:     Current Medications[2]        Review of Systems:     Review of Systems   Constitutional:  Negative for chills and fever.   HENT:  Negative for ear pain and sore throat.    Respiratory:  Negative for cough, shortness of breath and wheezing.    Cardiovascular:  Negative for chest pain and palpitations.   Gastrointestinal:  Negative for constipation, diarrhea, nausea and vomiting.   Genitourinary:  Negative for dysuria and hematuria.   Musculoskeletal:  Negative for joint swelling and joint deformity.   Neurological:  Negative for dizziness and weakness.           Physical Exam:     Temp:             Blood Pressure:  114/76        Pulse:   104     Respirations:     Weight:   84.4 kg (186 lb 1.1 oz)  Height:   5' 4" (1.626 m)  BMI:     Body mass index is 31.94 kg/m².    Physical Exam  Constitutional:       General: She is not in acute distress.  HENT:      Right Ear: Tympanic membrane normal.      Left Ear: Tympanic membrane normal.      Nose: No congestion or rhinorrhea.      Mouth/Throat:      Pharynx: No oropharyngeal exudate or posterior oropharyngeal erythema.   Cardiovascular:      Rate and Rhythm: Normal rate and regular rhythm.   Pulmonary:      Effort: Pulmonary effort is normal. No respiratory distress.      Breath sounds: No wheezing.   Abdominal:      " Palpations: Abdomen is soft.      Tenderness: There is no abdominal tenderness.   Skin:     General: Skin is warm.   Neurological:      General: No focal deficit present.      Mental Status: She is alert and oriented to person, place, and time.       Labs/Imaging/Testing:     Most recent CBC:  Lab Results   Component Value Date    WBC 10.11 11/04/2024    HGB 16.0 11/04/2024     11/04/2024     (H) 11/04/2024       Most recent Lipids:  Lab Results   Component Value Date    CHOL 194 11/04/2024    CHOL 194 11/04/2024    HDL 56 11/04/2024    HDL 56 11/04/2024    LDLCALC 113.6 11/04/2024    LDLCALC 113.6 11/04/2024    TRIG 122 11/04/2024    TRIG 122 11/04/2024       Most recent Chemistry:  Lab Results   Component Value Date     (L) 11/04/2024    K 4.6 11/04/2024    CL 93 (L) 11/04/2024    CO2 29 11/04/2024    BUN 6 11/04/2024    CREATININE 0.8 11/04/2024     (H) 11/04/2024    CALCIUM 9.5 11/04/2024    ALT 35 11/04/2024    AST 38 11/04/2024    ALKPHOS 233 (H) 11/04/2024    PROT 7.6 11/04/2024    ALBUMIN 3.4 (L) 11/04/2024       Other tests:  Lab Results   Component Value Date    TSH 4.702 (H) 11/04/2024    FREET4 0.81 11/04/2024    INR 1.1 04/29/2022    HGBA1C 8.2 (H) 11/04/2024    IRON 46 11/26/2019    FERRITIN 310 (H) 11/26/2019    [id number] 54 11/26/2019    MG 1.8 01/13/2022       Assessment and Plan:     1. Type 2 diabetes mellitus with diabetic polyneuropathy, with long-term current use of insulin  - Monitored the patient's A1C level, noting a decrease from 10.7 to 8.2, which is still above the target goal   - Increased Mounjaro dose from 5mg to 7.5mg to improve glycemic control, given the patient's tolerance of the current dose.  - Discussed potential side effects, particularly nausea, with the patient.  - Instructed the patient to fill the prescription at [facility] on [address] and [address]  - Continued Toujeo 40 units in the morning and 20 units in the evening.  - Deferred repeat A1C  testing until next visit in 3 months  - Advised the patient to contact the office if experiencing issues with increased Mounjaro dose.  - Monitored A1C level, noting improvement from 10.7 to 8.8 to 8.2.  - Sent Mounjaro prescription to [facility] on [address] and [address].    2. Alcoholic cirrhosis, unspecified whether ascites present  3. Portal hypertension  4. Alcohol use, unspecified with other alcohol-induced disorder  Encouraged cessation of alcohol.  Pt follows with Dr. Serna.    5. Neuropathy  - Evaluated neuropathy treatment: the patient reports improvement with increased Cymbalta dose.  - Continued Cymbalta 60mg twice daily for neuropathy management.  - Continued Gabapentin, to [facility] at [facility].  - gabapentin (NEURONTIN) 300 MG capsule; Take 1 capsule at breakfast, 1 capsule at lunch, and 3 capsules at bedtime.  Dispense: 150 capsule; Refill: 3    6. Recurrent major depressive disorder, in partial remission  Continue cymbalta      OSTEOMYELITIS:  - Noted active osteomyelitis in the patient's toe based on bone biopsy, currently being treated with oral doxycycline.  - Prescribed a 6-week course of doxycycline for osteomyelitis treatment.  - Considered potential need for IV antibiotics if oral treatment is ineffective for osteomyelitis.  - Explained IV antibiotic treatment process, including PICC line placement and home health involvement.  - Scheduled follow-up visit with Dr. Davalos for osteomyelitis treatment.  - Monitored infection in the toe next to the great toe on the right foot.  - Evaluated x-ray results, which revealed bone involvement.  - Observed no major swelling during physical exam.  - Initially prescribed antibiotics for treatment.  - Referred the patient to specialist (Dr. Davalos) for further management.    FOLLOW UP:  - Scheduled follow-up visit in 3 months.  - Message the office for any medication refills needed before next appointment.         No follow-ups on file.    "    Vishal Sanchez MD  2/20/2025    This note was prepared using voice-recognition software.  Although efforts are made to proofread the note, some errors may persist in the final document.         [1]   Patient Active Problem List  Diagnosis    Alcoholic cirrhosis of liver    Ascites    Type 2 diabetes mellitus with diabetic polyneuropathy, with long-term current use of insulin    History of cellulitis    Hyponatremia    Portal hypertensive gastropathy    Anxiety disorder    Depression    Esophageal varices    Portal hypertension    Liver mass    Hypokalemia    Thrombocytopenia, unspecified    Recurrent major depressive disorder, in partial remission    Alcohol use, unspecified with other alcohol-induced disorder   [2]   Current Outpatient Medications:     cyanocobalamin (VITAMIN B-12) 100 MCG tablet, Take 100 mcg by mouth once daily., Disp: , Rfl:     doxycycline (VIBRAMYCIN) 100 MG Cap, Take 1 capsule (100 mg total) by mouth 2 (two) times daily., Disp: 42 capsule, Rfl: 0    DULoxetine (CYMBALTA) 60 MG capsule, Take 1 capsule (60 mg total) by mouth 2 (two) times daily., Disp: 60 capsule, Rfl: 11    folic acid (FOLVITE) 1 MG tablet, Take 1 tablet (1,000 mcg total) by mouth once daily., Disp: 30 tablet, Rfl: 8    multivit with minerals/lutein (MULTIVITAMIN 50 PLUS ORAL), Take by mouth once daily., Disp: , Rfl:     pen needle, diabetic 30 gauge x 1/3" Ndle, NovoFine 30 30 gauge x 1/3" needle, Disp: 50 each, Rfl: 3    pen needle, diabetic, safety (NOVOFINE AUTOCOVER) 30 gauge x 1/3" Ndle, 1 each by Misc.(Non-Drug; Combo Route) route once daily., Disp: 50 each, Rfl: 3    spironolactone (ALDACTONE) 100 MG tablet, Take 2 tablets (200 mg total) by mouth once daily., Disp: 60 tablet, Rfl: 3    thiamine 100 MG tablet, Take 100 mg by mouth once daily., Disp: , Rfl:     tirzepatide (MOUNJARO) 5 mg/0.5 mL PnIj, Inject 5 mg into the skin every 7 days., Disp: 4 Pen, Rfl: 0    TOUJEO SOLOSTAR U-300 INSULIN 300 unit/mL (1.5 mL) " InPn pen, INJECT 40 UNITS UNDER THE SKIN EVERY MORNING AND 20 UNITS UNDER THE SKIN EVERY EVENING, Disp: 18 mL, Rfl: 1    gabapentin (NEURONTIN) 300 MG capsule, Take 1 capsule at breakfast, 1 capsule at lunch, and 3 capsules at bedtime., Disp: 150 capsule, Rfl: 3    tirzepatide 7.5 mg/0.5 mL PnIj, Inject 7.5 mg into the skin every 7 days., Disp: 2 mL, Rfl: 3

## 2025-02-20 ENCOUNTER — OFFICE VISIT (OUTPATIENT)
Dept: PRIMARY CARE CLINIC | Facility: CLINIC | Age: 60
End: 2025-02-20
Payer: COMMERCIAL

## 2025-02-20 ENCOUNTER — OFFICE VISIT (OUTPATIENT)
Dept: PODIATRY | Facility: CLINIC | Age: 60
End: 2025-02-20
Payer: COMMERCIAL

## 2025-02-20 VITALS
RESPIRATION RATE: 18 BRPM | HEART RATE: 105 BPM | BODY MASS INDEX: 31.62 KG/M2 | WEIGHT: 185.19 LBS | SYSTOLIC BLOOD PRESSURE: 117 MMHG | HEIGHT: 64 IN | DIASTOLIC BLOOD PRESSURE: 77 MMHG

## 2025-02-20 VITALS
DIASTOLIC BLOOD PRESSURE: 76 MMHG | HEIGHT: 64 IN | HEART RATE: 104 BPM | WEIGHT: 186.06 LBS | SYSTOLIC BLOOD PRESSURE: 114 MMHG | BODY MASS INDEX: 31.77 KG/M2 | OXYGEN SATURATION: 97 %

## 2025-02-20 DIAGNOSIS — K70.30 ALCOHOLIC CIRRHOSIS, UNSPECIFIED WHETHER ASCITES PRESENT: ICD-10-CM

## 2025-02-20 DIAGNOSIS — F33.41 RECURRENT MAJOR DEPRESSIVE DISORDER, IN PARTIAL REMISSION: ICD-10-CM

## 2025-02-20 DIAGNOSIS — Z79.4 TYPE 2 DIABETES MELLITUS WITH DIABETIC POLYNEUROPATHY, WITH LONG-TERM CURRENT USE OF INSULIN: Primary | ICD-10-CM

## 2025-02-20 DIAGNOSIS — M86.9 OSTEOMYELITIS OF SECOND TOE OF RIGHT FOOT: ICD-10-CM

## 2025-02-20 DIAGNOSIS — M86.9 TOE OSTEOMYELITIS, RIGHT: ICD-10-CM

## 2025-02-20 DIAGNOSIS — F10.988 ALCOHOL USE, UNSPECIFIED WITH OTHER ALCOHOL-INDUCED DISORDER: ICD-10-CM

## 2025-02-20 DIAGNOSIS — L97.512 ULCER OF RIGHT FOOT WITH FAT LAYER EXPOSED: Primary | ICD-10-CM

## 2025-02-20 DIAGNOSIS — I99.8 ISCHEMIC TOE: ICD-10-CM

## 2025-02-20 DIAGNOSIS — G62.9 NEUROPATHY: ICD-10-CM

## 2025-02-20 DIAGNOSIS — E11.42 TYPE 2 DIABETES MELLITUS WITH DIABETIC POLYNEUROPATHY, WITH LONG-TERM CURRENT USE OF INSULIN: Primary | ICD-10-CM

## 2025-02-20 DIAGNOSIS — K76.6 PORTAL HYPERTENSION: ICD-10-CM

## 2025-02-20 PROBLEM — C80.1 MALIGNANT NEOPLASTIC DISEASE: Status: RESOLVED | Noted: 2025-02-16 | Resolved: 2025-02-20

## 2025-02-20 RX ORDER — GABAPENTIN 300 MG/1
CAPSULE ORAL
Qty: 150 CAPSULE | Refills: 3 | Status: SHIPPED | OUTPATIENT
Start: 2025-02-20

## 2025-02-20 NOTE — PROGRESS NOTES
Subjective:     Patient    Brunilda Delgadillo is a 59 y.o. female.    Problem    01/02/25: Referred by Dr Devlin for suspected osteomyelitis right 2nd toe. Patient picks at her skin regularly which she thinks may have contributed to a wound, infection, and enlargement of the right 2nd toe. Has been going on for months, slowly worsening. Started on Bactrim + mupirocin per Dr Devlin; X rays done which were concerning for 2nd toe OM. Minimal pain in the toe, patient has extensive neuropathy.     01/16/25: Returns for followup of right 2nd toe osteomyelitis, confirmed by biopsy. Started 3 weeks doxycycline 01/06, estimated end date 02/17. Has numbness, tingling, burning, crampig, electrical feelings, hot, cold, shooting. Has right 2nd toe plantar wound which she is managing herself, admits to picking at the skin.     01/29/25: Returns for followup of right 2nd toe osteomyelitis with distal and plantar 2nd toe wounds. Applied toeball at last visit and increased duloxetine to 120 mg/day for ongoing nerve symptoms. Extended antibiotics an additional 3 weeks at last visit but patient did not pick them up and start them. Had significant improvement in nerve symptoms with duloxetine increase.     02/20/25: Returns for followup of right 2nd toe osteomyelitis, almost done with antibiotics. The toeball I had applied had fallen off and she replaced it with a bandage on her own which she believes was too tight, once removed she noticed the toe was dark and has been scrubbing it hard to try to get blood off. Not painful.      Primary Care Provider    Primary Care Provider: Vishal Sanchez MD   Last Seen: 2/20/2025     History    History obtained from patient and review of medical records.     Past Medical History:   Diagnosis Date    Anxiety disorder 8/21/2019    Ascites 8/21/2019    Cirrhosis     Depression 8/21/2019    Diabetes mellitus     History of cellulitis 8/21/2019    Hyponatremia 8/21/2019    Portal hypertensive  gastropathy 2019    On EGD     Type 2 diabetes mellitus 2019       Past Surgical History:   Procedure Laterality Date     SECTION  ,     COLONOSCOPY      ESOPHAGOGASTRODUODENOSCOPY N/A 2020    Procedure: EGD (ESOPHAGOGASTRODUODENOSCOPY);  Surgeon: Ayden Bernal MD;  [facility]  Service: Endoscopy;  Laterality: N/A;  labs prior    EYE SURGERY      cosmetic    HERNIA REPAIR      umbilical    PERITONEOCENTESIS N/A 10/21/2019    Procedure: PARACENTESIS, ABDOMINAL;  Surgeon: Israel Hidalgo MD;  Location: [facility], [place], operated by [facility] CATH LAB;  Service: Radiology;  Laterality: N/A;    TONSILLECTOMY  1972    TUBAL LIGATION      tummy tuck          Objective:     Vitals  Wt Readings from Last 1 Encounters:   25 84 kg (185 lb 3 oz)     Temp Readings from Last 1 Encounters:   24 96.4 °F (35.8 °C) (Temporal)     BP Readings from Last 1 Encounters:   25 117/77     Pulse Readings from Last 1 Encounters:   25 105       Dermatological Exam    Skin:  Pedal hair growth diminished and Pedal skin thin and shiny on right; peeling skin, large ulceration to fat plantar 2nd toe, ischemic changes to skin dorsal 2nd toe  Pedal hair growth diminished and Pedal skin thin and shiny on left; peeling skin             Nails:  9 nails thickened, right 2nd toenail absent    Vascular Exam    Arteries:  Posterior tibial artery palpable on right  Dorsalis pedis artery palpable on right  Posterior tibial artery palpable on left  Dorsalis pedis artery palpable on left    Veins:  Superficial veins unremarkable on right  Superficial veins unremarkable on left    Swellin+ pitting on right; 2+ at 2nd toe  1+ pitting on left    Neurological Exam    [place] touch test:  0/6 sites sensed, loss of protective sensation     Musculoskeletal Exam    Footwear:  Surgical shoe on right  Casual on left    Gait Exam:   Ambulatory Status: Ambulatory  Gait: Apropulsive  Assistive Devices: None    Foot  Progression Angle:  Normal on right  Normal on left     Right Lower Extremity Additional Findings:  2nd toe chronically swollen; no longer tender  Right foot and ankle function, strength, and range of motion unremarkable except as noted above.     Left Lower Extremity Additional Findings:  Left foot and ankle function, strength, and range of motion unremarkable except as noted above.    Imaging and Other Tests    Imagin24 right toe X rays: 2nd distal phalanx completely eroded with middle phalanx partially eroded; consistent  with deep infection    Independently reviewed and interpreted imaging, findings are as follows: N/A    Other Tests: The following A1c results were reviewed.   Hemoglobin A1C   Date Value Ref Range Status   2024 8.2 (H) 4.0 - 5.6 % Final     Comment:     ADA Screening Guidelines:  5.7-6.4%  Consistent with prediabetes  >or=6.5%  Consistent with diabetes    High levels of fetal hemoglobin interfere with the HbA1C  assay. Heterozygous hemoglobin variants (HbS, HgC, etc)do  not significantly interfere with this assay.   However, presence of multiple variants may affect accuracy.     09/15/2023 8.8 (H) 4.0 - 5.6 % Final     Comment:     ADA Screening Guidelines:  5.7-6.4%  Consistent with prediabetes  >or=6.5%  Consistent with diabetes    High levels of fetal hemoglobin interfere with the HbA1C  assay. Heterozygous hemoglobin variants (HbS, HgC, etc)do  not significantly interfere with this assay.   However, presence of multiple variants may affect accuracy.     06/15/2023 10.7 (H) 4.0 - 5.6 % Final     Comment:     ADA Screening Guidelines:  5.7-6.4%  Consistent with prediabetes  >or=6.5%  Consistent with diabetes    High levels of fetal hemoglobin interfere with the HbA1C  assay. Heterozygous hemoglobin variants (HbS, HgC, etc)do  not significantly interfere with this assay.   However, presence of multiple variants may affect accuracy.           Assessment:     Encounter Diagnoses    Name Primary?    Ulcer of right foot with fat layer exposed Yes    Osteomyelitis of second toe of right foot     Ischemic toe           Plan:     I counseled the patient on her conditions, their implications and medical management.    Diabetic foot with peripheral neuropathy: chronic stable   -Performed shoe inspection and diabetic foot education. Reviewed importance of blood glucose control, proper nutrition, and foot hygiene to minimize risk of complications of diabetes. Recommended daily foot inspections, daily moisturizer to feet, avoiding sharp instruments to feet, appropriate footwear at all times when ambulating, and following up regularly for routine foot care.   -Diabetic foot risk: 2023 IWGDF high ulcer risk.  -Continue duloxetine 120 mg/day.     Right 2nd toe osteomyelitis: chronic stable  -Complete 6 weeks PO antibiotics.     Right 2nd toe ulcer, ischemic changes: subacute  -Risk of amputation has increased, must monitor much more closely, discussed.   -Football dressing applied. Patient not to remove dressing.   -RLE surgical shoe.       Return to clinic in 1-2 weeks, call sooner PRN.

## 2025-03-06 ENCOUNTER — OFFICE VISIT (OUTPATIENT)
Dept: PODIATRY | Facility: CLINIC | Age: 60
End: 2025-03-06
Payer: COMMERCIAL

## 2025-03-06 VITALS
HEART RATE: 109 BPM | BODY MASS INDEX: 25.61 KG/M2 | WEIGHT: 150 LBS | HEIGHT: 64 IN | SYSTOLIC BLOOD PRESSURE: 149 MMHG | DIASTOLIC BLOOD PRESSURE: 101 MMHG

## 2025-03-06 DIAGNOSIS — E11.628 DIABETIC FOOT INFECTION: Primary | ICD-10-CM

## 2025-03-06 DIAGNOSIS — I99.8 ISCHEMIC TOE: ICD-10-CM

## 2025-03-06 DIAGNOSIS — L97.512 ULCER OF RIGHT FOOT WITH FAT LAYER EXPOSED: ICD-10-CM

## 2025-03-06 DIAGNOSIS — L08.9 DIABETIC FOOT INFECTION: Primary | ICD-10-CM

## 2025-03-06 LAB
GRAM STN SPEC: NORMAL
GRAM STN SPEC: NORMAL

## 2025-03-06 PROCEDURE — 3080F DIAST BP >= 90 MM HG: CPT | Mod: CPTII,S$GLB,, | Performed by: STUDENT IN AN ORGANIZED HEALTH CARE EDUCATION/TRAINING PROGRAM

## 2025-03-06 PROCEDURE — 99999 PR PBB SHADOW E&M-EST. PATIENT-LVL IV: CPT | Mod: PBBFAC,,, | Performed by: STUDENT IN AN ORGANIZED HEALTH CARE EDUCATION/TRAINING PROGRAM

## 2025-03-06 PROCEDURE — 3008F BODY MASS INDEX DOCD: CPT | Mod: CPTII,S$GLB,, | Performed by: STUDENT IN AN ORGANIZED HEALTH CARE EDUCATION/TRAINING PROGRAM

## 2025-03-06 PROCEDURE — 87075 CULTR BACTERIA EXCEPT BLOOD: CPT | Performed by: STUDENT IN AN ORGANIZED HEALTH CARE EDUCATION/TRAINING PROGRAM

## 2025-03-06 PROCEDURE — 3077F SYST BP >= 140 MM HG: CPT | Mod: CPTII,S$GLB,, | Performed by: STUDENT IN AN ORGANIZED HEALTH CARE EDUCATION/TRAINING PROGRAM

## 2025-03-06 PROCEDURE — 87147 CULTURE TYPE IMMUNOLOGIC: CPT | Performed by: STUDENT IN AN ORGANIZED HEALTH CARE EDUCATION/TRAINING PROGRAM

## 2025-03-06 PROCEDURE — 1159F MED LIST DOCD IN RCRD: CPT | Mod: CPTII,S$GLB,, | Performed by: STUDENT IN AN ORGANIZED HEALTH CARE EDUCATION/TRAINING PROGRAM

## 2025-03-06 PROCEDURE — 87186 SC STD MICRODIL/AGAR DIL: CPT | Performed by: STUDENT IN AN ORGANIZED HEALTH CARE EDUCATION/TRAINING PROGRAM

## 2025-03-06 PROCEDURE — 99213 OFFICE O/P EST LOW 20 MIN: CPT | Mod: S$GLB,,, | Performed by: STUDENT IN AN ORGANIZED HEALTH CARE EDUCATION/TRAINING PROGRAM

## 2025-03-06 PROCEDURE — 87205 SMEAR GRAM STAIN: CPT | Performed by: STUDENT IN AN ORGANIZED HEALTH CARE EDUCATION/TRAINING PROGRAM

## 2025-03-06 PROCEDURE — 87070 CULTURE OTHR SPECIMN AEROBIC: CPT | Performed by: STUDENT IN AN ORGANIZED HEALTH CARE EDUCATION/TRAINING PROGRAM

## 2025-03-06 NOTE — PROGRESS NOTES
Subjective:     Patient    Brunilda Delgadillo is a 59 y.o. female.    Problem    01/02/25: Referred by Dr Devlin for suspected osteomyelitis right 2nd toe. Patient picks at her skin regularly which she thinks may have contributed to a wound, infection, and enlargement of the right 2nd toe. Has been going on for months, slowly worsening. Started on Bactrim + mupirocin per Dr Devlin; X rays done which were concerning for 2nd toe OM. Minimal pain in the toe, patient has extensive neuropathy.     01/16/25: Returns for followup of right 2nd toe osteomyelitis, confirmed by biopsy. Started 3 weeks doxycycline 01/06, estimated end date 02/17. Has numbness, tingling, burning, crampig, electrical feelings, hot, cold, shooting. Has right 2nd toe plantar wound which she is managing herself, admits to picking at the skin.     01/29/25: Returns for followup of right 2nd toe osteomyelitis with distal and plantar 2nd toe wounds. Applied toeball at last visit and increased duloxetine to 120 mg/day for ongoing nerve symptoms. Extended antibiotics an additional 3 weeks at last visit but patient did not pick them up and start them. Had significant improvement in nerve symptoms with duloxetine increase.     02/20/25: Returns for followup of right 2nd toe osteomyelitis, almost done with antibiotics. The toeball I had applied had fallen off and she replaced it with a bandage on her own which she believes was too tight, once removed she noticed the toe was dark and has been scrubbing it hard to try to get blood off. Not painful.      03/06/25: Returns for followup of right 2nd toe. Not currently on antibiotics. Dressing came off and she scrubbed the skin a lot and rewrapped the foot and more skin broke down at the 3rd and 4th toes. Not on antibiotics currently.     Primary Care Provider    Primary Care Provider: Vishal Sanchez MD   Last Seen: 2/20/2025     History    History obtained from patient and review of medical records.      Past Medical History:   Diagnosis Date    Anxiety disorder 2019    Ascites 2019    Cirrhosis     Depression 2019    Diabetes mellitus     History of cellulitis 2019    Hyponatremia 2019    Portal hypertensive gastropathy 2019    On EGD     Type 2 diabetes mellitus 2019       Past Surgical History:   Procedure Laterality Date     SECTION  ,     COLONOSCOPY      ESOPHAGOGASTRODUODENOSCOPY N/A 2020    Procedure: EGD (ESOPHAGOGASTRODUODENOSCOPY);  Surgeon: Ayden Bernal MD;  Location: [facility] ENDO [address]);  Service: Endoscopy;  Laterality: N/A;  labs prior    EYE SURGERY      cosmetic    HERNIA REPAIR      umbilical    PERITONEOCENTESIS N/A 10/21/2019    Procedure: PARACENTESIS, ABDOMINAL;  Surgeon: Israel Hidalgo MD;  Location: [facility] CATH LAB;  Service: Radiology;  Laterality: N/A;    TONSILLECTOMY  1972    TUBAL LIGATION      tummy tuck          Objective:     Vitals  Wt Readings from Last 1 Encounters:   25 68 kg (150 lb)     Temp Readings from Last 1 Encounters:   24 96.4 °F (35.8 °C) (Temporal)     BP Readings from Last 1 Encounters:   25 (!) 149/101     Pulse Readings from Last 1 Encounters:   25 109       Dermatological Exam    Skin:  Pedal hair growth diminished and Pedal skin thin and shiny on right; peeling skin, large ulceration to fat plantar 2nd toe, ischemic changes to skin dorsal 2nd toe; newer ulcerations medial and lateral 3rd toe and medial 4th toe  Pedal hair growth diminished and Pedal skin thin and shiny on left; peeling skin                 Nails:  9 nails thickened, right 2nd toenail absent    Vascular Exam    Arteries:  Posterior tibial artery palpable on right  Dorsalis pedis artery palpable on right  Posterior tibial artery palpable on left  Dorsalis pedis artery palpable on left    Veins:  Superficial veins unremarkable on right  Superficial veins unremarkable on left    Swellin+ pitting on  right   1+ pitting on left    Neurological Exam    [place] touch test:  0/6 sites sensed, loss of protective sensation     Musculoskeletal Exam    Footwear:  Surgical shoe on right  Casual on left    Gait Exam:   Ambulatory Status: Ambulatory  Gait: Apropulsive  Assistive Devices: None    Foot Progression Angle:  Normal on right  Normal on left     Right Lower Extremity Additional Findings:  2nd toe chronically swollen; no longer tender  Right foot and ankle function, strength, and range of motion unremarkable except as noted above.     Left Lower Extremity Additional Findings:  Left foot and ankle function, strength, and range of motion unremarkable except as noted above.    Imaging and Other Tests    Imagin24 right toe X rays: 2nd distal phalanx completely eroded with middle phalanx partially eroded; consistent  with deep infection    Independently reviewed and interpreted imaging, findings are as follows: N/A    Other Tests: The following A1c results were reviewed.   Hemoglobin A1C   Date Value Ref Range Status   2024 8.2 (H) 4.0 - 5.6 % Final     Comment:     ADA Screening Guidelines:  5.7-6.4%  Consistent with prediabetes  >or=6.5%  Consistent with diabetes    High levels of fetal hemoglobin interfere with the HbA1C  assay. Heterozygous hemoglobin variants (HbS, HgC, etc)do  not significantly interfere with this assay.   However, presence of multiple variants may affect accuracy.     09/15/2023 8.8 (H) 4.0 - 5.6 % Final     Comment:     ADA Screening Guidelines:  5.7-6.4%  Consistent with prediabetes  >or=6.5%  Consistent with diabetes    High levels of fetal hemoglobin interfere with the HbA1C  assay. Heterozygous hemoglobin variants (HbS, HgC, etc)do  not significantly interfere with this assay.   However, presence of multiple variants may affect accuracy.     06/15/2023 10.7 (H) 4.0 - 5.6 % Final     Comment:     ADA Screening Guidelines:  5.7-6.4%  Consistent with prediabetes  >or=6.5%   Consistent with diabetes    High levels of fetal hemoglobin interfere with the HbA1C  assay. Heterozygous hemoglobin variants (HbS, HgC, etc)do  not significantly interfere with this assay.   However, presence of multiple variants may affect accuracy.           Assessment:     Encounter Diagnoses   Name Primary?    Diabetic foot infection Yes    Ischemic toe     Ulcer of right foot with fat layer exposed           Plan:     I counseled the patient on her conditions, their implications and medical management.    Diabetic foot with peripheral neuropathy: chronic stable   -Performed shoe inspection and diabetic foot education. Reviewed importance of blood glucose control, proper nutrition, and foot hygiene to minimize risk of complications of diabetes. Recommended daily foot inspections, daily moisturizer to feet, avoiding sharp instruments to feet, appropriate footwear at all times when ambulating, and following up regularly for routine foot care.   -Diabetic foot risk: 2023 IWGDF high ulcer risk.  -Continue duloxetine 120 mg/day.     Right 2nd toe osteomyelitis: chronic stable  -Completed 6 weeks PO antibiotics.     Right 2-4th toe ulcers, ischemic changes, cellulitis: subacute  -Risk of amputation has increased, must monitor much more closely, discussed.   -Culture collected, will start antibiotics if positive.   -Football dressing applied. Patient not to remove dressing.   -RLE surgical shoe.       Return to clinic in 1-2 weeks, call sooner PRN.

## 2025-03-07 LAB — BACTERIA SPEC ANAEROBE CULT: NORMAL

## 2025-03-08 LAB
BACTERIA SPEC AEROBE CULT: ABNORMAL
BACTERIA SPEC AEROBE CULT: ABNORMAL

## 2025-03-10 ENCOUNTER — TELEPHONE (OUTPATIENT)
Dept: PODIATRY | Facility: CLINIC | Age: 60
End: 2025-03-10
Payer: COMMERCIAL

## 2025-03-10 ENCOUNTER — RESULTS FOLLOW-UP (OUTPATIENT)
Dept: PODIATRY | Facility: CLINIC | Age: 60
End: 2025-03-10

## 2025-03-10 DIAGNOSIS — L08.9 DIABETIC FOOT INFECTION: Primary | ICD-10-CM

## 2025-03-10 DIAGNOSIS — E11.628 DIABETIC FOOT INFECTION: Primary | ICD-10-CM

## 2025-03-10 RX ORDER — LEVOFLOXACIN 750 MG/1
750 TABLET ORAL DAILY
Qty: 10 TABLET | Refills: 0 | Status: SHIPPED | OUTPATIENT
Start: 2025-03-10

## 2025-03-10 RX ORDER — CLINDAMYCIN HYDROCHLORIDE 300 MG/1
300 CAPSULE ORAL 2 TIMES DAILY
Qty: 20 CAPSULE | Refills: 0 | Status: SHIPPED | OUTPATIENT
Start: 2025-03-10

## 2025-03-10 NOTE — TELEPHONE ENCOUNTER
Spoke with patient in regards to her lab results per: Dr. Davalos and he has sent over 2 antibiotics to her pharmacy, patient verbally understood telephone encounter and has confirmed appt. on 03/17/2025              Message Details  Received: Today  Hilario Davalos DPM [place], [place], [place]  03/06/2025 - Results: [facility], [facility] and others  (Newest Message First)  Hilario Davalos DPM to Me  (Selected Message)      3/10/25  1:32 PM  Result Note  Please alert pt, sent 2 antibiotics to [facility] pharmacy for the 2 bacteria -Tino

## 2025-03-14 ENCOUNTER — TELEPHONE (OUTPATIENT)
Dept: PODIATRY | Facility: CLINIC | Age: 60
End: 2025-03-14
Payer: COMMERCIAL

## 2025-03-14 NOTE — TELEPHONE ENCOUNTER
Left vm message for patient on both contact #'s to confirm her appointment on 03/17/2025 w/Dr. Davalos(Podiatry)

## 2025-03-17 ENCOUNTER — OFFICE VISIT (OUTPATIENT)
Dept: PODIATRY | Facility: CLINIC | Age: 60
End: 2025-03-17
Payer: COMMERCIAL

## 2025-03-17 VITALS
DIASTOLIC BLOOD PRESSURE: 71 MMHG | HEART RATE: 118 BPM | HEIGHT: 64 IN | SYSTOLIC BLOOD PRESSURE: 111 MMHG | BODY MASS INDEX: 30.6 KG/M2 | WEIGHT: 179.25 LBS

## 2025-03-17 DIAGNOSIS — I99.8 ISCHEMIC TOE: ICD-10-CM

## 2025-03-17 DIAGNOSIS — L08.9 DIABETIC FOOT INFECTION: Primary | ICD-10-CM

## 2025-03-17 DIAGNOSIS — L97.512 ULCER OF RIGHT FOOT WITH FAT LAYER EXPOSED: ICD-10-CM

## 2025-03-17 DIAGNOSIS — E11.628 DIABETIC FOOT INFECTION: Primary | ICD-10-CM

## 2025-03-17 PROCEDURE — 1159F MED LIST DOCD IN RCRD: CPT | Mod: CPTII,S$GLB,, | Performed by: STUDENT IN AN ORGANIZED HEALTH CARE EDUCATION/TRAINING PROGRAM

## 2025-03-17 PROCEDURE — 3074F SYST BP LT 130 MM HG: CPT | Mod: CPTII,S$GLB,, | Performed by: STUDENT IN AN ORGANIZED HEALTH CARE EDUCATION/TRAINING PROGRAM

## 2025-03-17 PROCEDURE — 3008F BODY MASS INDEX DOCD: CPT | Mod: CPTII,S$GLB,, | Performed by: STUDENT IN AN ORGANIZED HEALTH CARE EDUCATION/TRAINING PROGRAM

## 2025-03-17 PROCEDURE — 99213 OFFICE O/P EST LOW 20 MIN: CPT | Mod: S$GLB,,, | Performed by: STUDENT IN AN ORGANIZED HEALTH CARE EDUCATION/TRAINING PROGRAM

## 2025-03-17 PROCEDURE — 99999 PR PBB SHADOW E&M-EST. PATIENT-LVL IV: CPT | Mod: PBBFAC,,, | Performed by: STUDENT IN AN ORGANIZED HEALTH CARE EDUCATION/TRAINING PROGRAM

## 2025-03-17 PROCEDURE — 3078F DIAST BP <80 MM HG: CPT | Mod: CPTII,S$GLB,, | Performed by: STUDENT IN AN ORGANIZED HEALTH CARE EDUCATION/TRAINING PROGRAM

## 2025-03-17 NOTE — PROGRESS NOTES
Subjective:     Patient    Brunilda Delgadillo is a 59 y.o. female.    Problem    01/02/25: Referred by Dr Devlin for suspected osteomyelitis right 2nd toe. Patient picks at her skin regularly which she thinks may have contributed to a wound, infection, and enlargement of the right 2nd toe. Has been going on for months, slowly worsening. Started on Bactrim + mupirocin per Dr Devlin; X rays done which were concerning for 2nd toe OM. Minimal pain in the toe, patient has extensive neuropathy.     01/16/25: Returns for followup of right 2nd toe osteomyelitis, confirmed by biopsy. Started 3 weeks doxycycline 01/06, estimated end date 02/17. Has numbness, tingling, burning, crampig, electrical feelings, hot, cold, shooting. Has right 2nd toe plantar wound which she is managing herself, admits to picking at the skin.     01/29/25: Returns for followup of right 2nd toe osteomyelitis with distal and plantar 2nd toe wounds. Applied toeball at last visit and increased duloxetine to 120 mg/day for ongoing nerve symptoms. Extended antibiotics an additional 3 weeks at last visit but patient did not pick them up and start them. Had significant improvement in nerve symptoms with duloxetine increase.     02/20/25: Returns for followup of right 2nd toe osteomyelitis, almost done with antibiotics. The toeball I had applied had fallen off and she replaced it with a bandage on her own which she believes was too tight, once removed she noticed the toe was dark and has been scrubbing it hard to try to get blood off. Not painful.      03/06/25: Returns for followup of right 2nd toe. Not currently on antibiotics. Dressing came off and she scrubbed the skin a lot and rewrapped the foot and more skin broke down at the 3rd and 4th toes. Not on antibiotics currently.     03/17/25: Returns for followup of right 2nd toe. On clindamycin and levofloxacin for current infection. Dressing only lasted about a week, no new concerns.     Primary  Care Provider    Primary Care Provider: Vishal Sanchez MD   Last Seen: 2025     History    History obtained from patient and review of medical records.     Past Medical History:   Diagnosis Date    Anxiety disorder 2019    Ascites 2019    Cirrhosis     Depression 2019    Diabetes mellitus     History of cellulitis 2019    Hyponatremia 2019    Portal hypertensive gastropathy 2019    On EGD     Type 2 diabetes mellitus 2019       Past Surgical History:   Procedure Laterality Date     SECTION  ,     COLONOSCOPY      ESOPHAGOGASTRODUODENOSCOPY N/A 2020    Procedure: EGD (ESOPHAGOGASTRODUODENOSCOPY);  Surgeon: Ayden Bernal MD;  Location: [address]);  Service: Endoscopy;  Laterality: N/A;  labs prior    EYE SURGERY      cosmetic    HERNIA REPAIR      umbilical    PERITONEOCENTESIS N/A 10/21/2019    Procedure: PARACENTESIS, ABDOMINAL;  Surgeon: Israel Hidalgo MD;  Location: [facility] CATH LAB;  Service: Radiology;  Laterality: N/A;    TONSILLECTOMY  1972    TUBAL LIGATION      tummy tuck          Objective:     Vitals  Wt Readings from Last 1 Encounters:   25 81.3 kg (179 lb 3.7 oz)     Temp Readings from Last 1 Encounters:   24 96.4 °F (35.8 °C) (Temporal)     BP Readings from Last 1 Encounters:   25 111/71     Pulse Readings from Last 1 Encounters:   25 (!) 118       Dermatological Exam    Skin:  Pedal hair growth diminished and Pedal skin thin and shiny on right; peeling skin, large ulceration to fat plantar 2nd toe, ischemic changes to skin dorsal 2nd toe; newer ulcerations medial and lateral 3rd toe and medial 4th toe  Pedal hair growth diminished and Pedal skin thin and shiny on left; peeling skin                 Nails:  9 nails thickened, right 2nd toenail absent    Vascular Exam    Arteries:  Posterior tibial artery palpable on right  Dorsalis pedis artery palpable on right  Posterior tibial artery palpable  on left  Dorsalis pedis artery palpable on left    Veins:  Superficial veins unremarkable on right  Superficial veins unremarkable on left    Swellin+ pitting on right   1+ pitting on left    Neurological Exam    [place] touch test:  0/6 sites sensed, loss of protective sensation     Musculoskeletal Exam    Footwear:  Surgical shoe on right  Casual on left    Gait Exam:   Ambulatory Status: Ambulatory  Gait: Apropulsive  Assistive Devices: None    Foot Progression Angle:  Normal on right  Normal on left     Right Lower Extremity Additional Findings:  2nd toe chronically swollen; no longer tender  Right foot and ankle function, strength, and range of motion unremarkable except as noted above.     Left Lower Extremity Additional Findings:  Left foot and ankle function, strength, and range of motion unremarkable except as noted above.    Imaging and Other Tests    Imagin24 right toe X rays: 2nd distal phalanx completely eroded with middle phalanx partially eroded; consistent  with deep infection    Independently reviewed and interpreted imaging, findings are as follows: N/A    Other Tests: The following A1c results were reviewed.   Hemoglobin A1C   Date Value Ref Range Status   2024 8.2 (H) 4.0 - 5.6 % Final     Comment:     ADA Screening Guidelines:  5.7-6.4%  Consistent with prediabetes  >or=6.5%  Consistent with diabetes    High levels of fetal hemoglobin interfere with the HbA1C  assay. Heterozygous hemoglobin variants (HbS, HgC, etc)do  not significantly interfere with this assay.   However, presence of multiple variants may affect accuracy.     09/15/2023 8.8 (H) 4.0 - 5.6 % Final     Comment:     ADA Screening Guidelines:  5.7-6.4%  Consistent with prediabetes  >or=6.5%  Consistent with diabetes    High levels of fetal hemoglobin interfere with the HbA1C  assay. Heterozygous hemoglobin variants (HbS, HgC, etc)do  not significantly interfere with this assay.   However, presence of multiple  variants may affect accuracy.     06/15/2023 10.7 (H) 4.0 - 5.6 % Final     Comment:     ADA Screening Guidelines:  5.7-6.4%  Consistent with prediabetes  >or=6.5%  Consistent with diabetes    High levels of fetal hemoglobin interfere with the HbA1C  assay. Heterozygous hemoglobin variants (HbS, HgC, etc)do  not significantly interfere with this assay.   However, presence of multiple variants may affect accuracy.           Assessment:     Encounter Diagnoses   Name Primary?    Diabetic foot infection Yes    Ischemic toe     Ulcer of right foot with fat layer exposed             Plan:     I counseled the patient on her conditions, their implications and medical management.    Diabetic foot with peripheral neuropathy: chronic stable   -Performed shoe inspection and diabetic foot education. Reviewed importance of blood glucose control, proper nutrition, and foot hygiene to minimize risk of complications of diabetes. Recommended daily foot inspections, daily moisturizer to feet, avoiding sharp instruments to feet, appropriate footwear at all times when ambulating, and following up regularly for routine foot care.   -Diabetic foot risk: 2023 IWF high ulcer risk.  -Continue duloxetine 120 mg/day.     Right 2nd toe osteomyelitis: chronic stable  -Completed 6 weeks PO antibiotics. Monitoring.     Right 2-4th toe ulcers, ischemic changes, cellulitis: subacute   -Complete clindamycin + levofloxacin.   -Football dressing applied. Patient not to remove dressing.   -RLE surgical shoe.       Return to clinic in 1-2 weeks, call sooner PRN.

## 2025-04-01 DIAGNOSIS — G62.9 NEUROPATHY: ICD-10-CM

## 2025-04-01 RX ORDER — FOLIC ACID 1 MG/1
1000 TABLET ORAL DAILY
Qty: 30 TABLET | Refills: 8 | Status: SHIPPED | OUTPATIENT
Start: 2025-04-01

## 2025-04-01 NOTE — TELEPHONE ENCOUNTER
----- Message from Med Assistant Mandujano sent at 4/1/2025  1:27 PM CDT -----  Contact: [phone]  Prescription refill request.RX name and strength (copy/paste from chart):   folic acid (FOLVITE) 1 MG tabletDirections (copy/paste from chart): Take 1 tablet (1,000 mcg total) by mouth once daily Is this a 30 day or 90 day RX:  30 dayLocal pharmacy or mail order pharmacy:  localPharmacy name and phone # (copy/paste from chart):   [address] [address]Cyrqn: [phone] Fax: [phone]: Not open 24 hoursAdditional information:  n/a

## 2025-04-01 NOTE — TELEPHONE ENCOUNTER
No care due was identified.  [facility] Embedded Care Due Messages. Reference number: [id number].   4/01/2025 1:33:31 PM CDT

## 2025-04-02 ENCOUNTER — OFFICE VISIT (OUTPATIENT)
Dept: PODIATRY | Facility: CLINIC | Age: 60
End: 2025-04-02
Payer: COMMERCIAL

## 2025-04-02 VITALS
DIASTOLIC BLOOD PRESSURE: 90 MMHG | BODY MASS INDEX: 30.77 KG/M2 | HEIGHT: 64 IN | HEART RATE: 118 BPM | SYSTOLIC BLOOD PRESSURE: 131 MMHG

## 2025-04-02 DIAGNOSIS — E11.628 DIABETIC FOOT INFECTION: ICD-10-CM

## 2025-04-02 DIAGNOSIS — I99.8 ISCHEMIC TOE: ICD-10-CM

## 2025-04-02 DIAGNOSIS — L97.512 ULCER OF RIGHT FOOT WITH FAT LAYER EXPOSED: Primary | ICD-10-CM

## 2025-04-02 DIAGNOSIS — L08.9 DIABETIC FOOT INFECTION: ICD-10-CM

## 2025-04-02 PROCEDURE — 3008F BODY MASS INDEX DOCD: CPT | Mod: CPTII,S$GLB,, | Performed by: STUDENT IN AN ORGANIZED HEALTH CARE EDUCATION/TRAINING PROGRAM

## 2025-04-02 PROCEDURE — 3080F DIAST BP >= 90 MM HG: CPT | Mod: CPTII,S$GLB,, | Performed by: STUDENT IN AN ORGANIZED HEALTH CARE EDUCATION/TRAINING PROGRAM

## 2025-04-02 PROCEDURE — 3075F SYST BP GE 130 - 139MM HG: CPT | Mod: CPTII,S$GLB,, | Performed by: STUDENT IN AN ORGANIZED HEALTH CARE EDUCATION/TRAINING PROGRAM

## 2025-04-02 PROCEDURE — 99213 OFFICE O/P EST LOW 20 MIN: CPT | Mod: S$GLB,,, | Performed by: STUDENT IN AN ORGANIZED HEALTH CARE EDUCATION/TRAINING PROGRAM

## 2025-04-02 PROCEDURE — 99999 PR PBB SHADOW E&M-EST. PATIENT-LVL III: CPT | Mod: PBBFAC,,, | Performed by: STUDENT IN AN ORGANIZED HEALTH CARE EDUCATION/TRAINING PROGRAM

## 2025-04-02 PROCEDURE — 1159F MED LIST DOCD IN RCRD: CPT | Mod: CPTII,S$GLB,, | Performed by: STUDENT IN AN ORGANIZED HEALTH CARE EDUCATION/TRAINING PROGRAM

## 2025-04-02 NOTE — PROGRESS NOTES
Subjective:     Patient    Brunilda Delgadillo is a 59 y.o. female.    Problem    01/02/25: Referred by Dr Devlin for suspected osteomyelitis right 2nd toe. Patient picks at her skin regularly which she thinks may have contributed to a wound, infection, and enlargement of the right 2nd toe. Has been going on for months, slowly worsening. Started on Bactrim + mupirocin per Dr Devlin; X rays done which were concerning for 2nd toe OM. Minimal pain in the toe, patient has extensive neuropathy.     01/16/25: Returns for followup of right 2nd toe osteomyelitis, confirmed by biopsy. Started 3 weeks doxycycline 01/06, estimated end date 02/17. Has numbness, tingling, burning, crampig, electrical feelings, hot, cold, shooting. Has right 2nd toe plantar wound which she is managing herself, admits to picking at the skin.     01/29/25: Returns for followup of right 2nd toe osteomyelitis with distal and plantar 2nd toe wounds. Applied toeball at last visit and increased duloxetine to 120 mg/day for ongoing nerve symptoms. Extended antibiotics an additional 3 weeks at last visit but patient did not pick them up and start them. Had significant improvement in nerve symptoms with duloxetine increase.     02/20/25: Returns for followup of right 2nd toe osteomyelitis, almost done with antibiotics. The toeball I had applied had fallen off and she replaced it with a bandage on her own which she believes was too tight, once removed she noticed the toe was dark and has been scrubbing it hard to try to get blood off. Not painful.      03/06/25: Returns for followup of right 2nd toe. Not currently on antibiotics. Dressing came off and she scrubbed the skin a lot and rewrapped the foot and more skin broke down at the 3rd and 4th toes. Not on antibiotics currently.     03/17/25: Returns for followup of right 2nd toe. On clindamycin and levofloxacin for current infection. Dressing only lasted about a week, no new concerns.     04/02/25:  Returns for followup of right forefoot wounds, continue to improve. No new concerns. Dressings last about a week before sliding down too much     Primary Care Provider    Primary Care Provider: Vishal Sanchez MD   Last Seen: 2025     History    History obtained from patient and review of medical records.     Past Medical History:   Diagnosis Date    Anxiety disorder 2019    Ascites 2019    Cirrhosis     Depression 2019    Diabetes mellitus     History of cellulitis 2019    Hyponatremia 2019    Portal hypertensive gastropathy 2019    On EGD     Type 2 diabetes mellitus 2019       Past Surgical History:   Procedure Laterality Date     SECTION  ,     COLONOSCOPY      ESOPHAGOGASTRODUODENOSCOPY N/A 2020    Procedure: EGD (ESOPHAGOGASTRODUODENOSCOPY);  Surgeon: Ayden Bernal MD;  Location: [address]);  Service: Endoscopy;  Laterality: N/A;  labs prior    EYE SURGERY      cosmetic    HERNIA REPAIR      umbilical    PERITONEOCENTESIS N/A 10/21/2019    Procedure: PARACENTESIS, ABDOMINAL;  Surgeon: Israel Hidalgo MD;  Location: [facility] CATH LAB;  Service: Radiology;  Laterality: N/A;    TONSILLECTOMY  1972    TUBAL LIGATION      tummy [facility]          Objective:     Vitals  Wt Readings from Last 1 Encounters:   25 81.3 kg (179 lb 3.7 oz)     Temp Readings from Last 1 Encounters:   24 96.4 °F (35.8 °C) (Temporal)     BP Readings from Last 1 Encounters:   25 (!) 131/90     Pulse Readings from Last 1 Encounters:   25 (!) 118       Dermatological Exam    Skin:  Pedal hair growth diminished and Pedal skin thin and shiny on right; peeling skin, large ulceration to fat plantar 2nd toe improving, ischemic changes to skin dorsal 2nd toe now resolivng; newer ulcerations medial and lateral 3rd toe and medial 4th toe  Pedal hair growth diminished and Pedal skin thin and shiny on left; peeling skin         Nails:  9 nails thickened,  right 2nd toenail absent    Vascular Exam    Arteries:  Posterior tibial artery palpable on right  Dorsalis pedis artery palpable on right  Posterior tibial artery palpable on left  Dorsalis pedis artery palpable on left    Veins:  Superficial veins unremarkable on right  Superficial veins unremarkable on left    [place]well[place]+ pitting on right   1+ pitting on left    Neurological Exam    [place] touch test:  0/6 sites sensed, loss of protective sensation     Musculoskeletal Exam    Footwear:  Surgical shoe on right  Casual on left    Gait Exam:   Ambulatory Status: Ambulatory  Gait: Apropulsive  Assistive Devices: None    Foot Progression Angle:  Normal on right  Normal on left     Right Lower Extremity Additional Findings:  2nd toe chronically swollen; no longer tender  Right foot and ankle function, strength, and range of motion unremarkable except as noted above.     Left Lower Extremity Additional Findings:  Left foot and ankle function, strength, and range of motion unremarkable except as noted above.    Imaging and Other Tests    Imagin24 right toe X rays: 2nd distal phalanx completely eroded with middle phalanx partially eroded; consistent  with deep infection    Independently reviewed and interpreted imaging, findings are as follows: N/A    Other Tests: The following A1c results were reviewed.   Hemoglobin A1C   Date Value Ref Range Status   2024 8.2 (H) 4.0 - 5.6 % Final     Comment:     ADA Screening Guidelines:  5.7-6.4%  Consistent with prediabetes  >or=6.5%  Consistent with diabetes    High levels of fetal hemoglobin interfere with the HbA1C  assay. Heterozygous hemoglobin variants (HbS, HgC, etc)do  not significantly interfere with this assay.   However, presence of multiple variants may affect accuracy.     09/15/2023 8.8 (H) 4.0 - 5.6 % Final     Comment:     ADA Screening Guidelines:  5.7-6.4%  Consistent with prediabetes  >or=6.5%  Consistent with diabetes    High levels of fetal  hemoglobin interfere with the HbA1C  assay. Heterozygous hemoglobin variants (HbS, HgC, etc)do  not significantly interfere with this assay.   However, presence of multiple variants may affect accuracy.     06/15/2023 10.7 (H) 4.0 - 5.6 % Final     Comment:     ADA Screening Guidelines:  5.7-6.4%  Consistent with prediabetes  >or=6.5%  Consistent with diabetes    High levels of fetal hemoglobin interfere with the HbA1C  assay. Heterozygous hemoglobin variants (HbS, HgC, etc)do  not significantly interfere with this assay.   However, presence of multiple variants may affect accuracy.           Assessment:     Encounter Diagnoses   Name Primary?    Ulcer of right foot with fat layer exposed Yes    Ischemic toe     Diabetic foot infection               Plan:     I counseled the patient on her conditions, their implications and medical management.    Diabetic foot with peripheral neuropathy: chronic stable   -Performed shoe inspection and diabetic foot education. Reviewed importance of blood glucose control, proper nutrition, and foot hygiene to minimize risk of complications of diabetes. Recommended daily foot inspections, daily moisturizer to feet, avoiding sharp instruments to feet, appropriate footwear at all times when ambulating, and following up regularly for routine foot care.   -Diabetic foot risk: 2023 IWGDF high ulcer risk.  -Continue duloxetine 120 mg/day.     Right 2nd toe osteomyelitis: chronic stable  -Completed 6 weeks PO antibiotics. Monitoring.     Right 2-4th toe ulcers, ischemic changes, cellulitis: subacute   -Complete clindamycin + levofloxacin.   -Football dressing applied. Patient not to remove dressing.   -RLE surgical shoe.       Return to clinic in 1-2 weeks, call sooner PRN.

## 2025-04-15 ENCOUNTER — OFFICE VISIT (OUTPATIENT)
Dept: PODIATRY | Facility: CLINIC | Age: 60
End: 2025-04-15
Payer: COMMERCIAL

## 2025-04-15 VITALS
DIASTOLIC BLOOD PRESSURE: 84 MMHG | BODY MASS INDEX: 30.77 KG/M2 | HEART RATE: 108 BPM | HEIGHT: 64 IN | SYSTOLIC BLOOD PRESSURE: 118 MMHG

## 2025-04-15 DIAGNOSIS — L97.512 ULCER OF RIGHT FOOT WITH FAT LAYER EXPOSED: Primary | ICD-10-CM

## 2025-04-15 PROCEDURE — 3074F SYST BP LT 130 MM HG: CPT | Mod: CPTII,S$GLB,, | Performed by: STUDENT IN AN ORGANIZED HEALTH CARE EDUCATION/TRAINING PROGRAM

## 2025-04-15 PROCEDURE — 3079F DIAST BP 80-89 MM HG: CPT | Mod: CPTII,S$GLB,, | Performed by: STUDENT IN AN ORGANIZED HEALTH CARE EDUCATION/TRAINING PROGRAM

## 2025-04-15 PROCEDURE — 1159F MED LIST DOCD IN RCRD: CPT | Mod: CPTII,S$GLB,, | Performed by: STUDENT IN AN ORGANIZED HEALTH CARE EDUCATION/TRAINING PROGRAM

## 2025-04-15 PROCEDURE — 99213 OFFICE O/P EST LOW 20 MIN: CPT | Mod: S$GLB,,, | Performed by: STUDENT IN AN ORGANIZED HEALTH CARE EDUCATION/TRAINING PROGRAM

## 2025-04-15 PROCEDURE — 3008F BODY MASS INDEX DOCD: CPT | Mod: CPTII,S$GLB,, | Performed by: STUDENT IN AN ORGANIZED HEALTH CARE EDUCATION/TRAINING PROGRAM

## 2025-04-15 PROCEDURE — 99999 PR PBB SHADOW E&M-EST. PATIENT-LVL III: CPT | Mod: PBBFAC,,, | Performed by: STUDENT IN AN ORGANIZED HEALTH CARE EDUCATION/TRAINING PROGRAM

## 2025-04-15 NOTE — PROGRESS NOTES
Subjective:     Patient    Brunilda Delgadillo is a 59 y.o. female.    Problem    01/02/25: Referred by Dr Devlin for suspected osteomyelitis right 2nd toe. Patient picks at her skin regularly which she thinks may have contributed to a wound, infection, and enlargement of the right 2nd toe. Has been going on for months, slowly worsening. Started on Bactrim + mupirocin per Dr Devlin; X rays done which were concerning for 2nd toe OM. Minimal pain in the toe, patient has extensive neuropathy.     01/16/25: Returns for followup of right 2nd toe osteomyelitis, confirmed by biopsy. Started 3 weeks doxycycline 01/06, estimated end date 02/17. Has numbness, tingling, burning, crampig, electrical feelings, hot, cold, shooting. Has right 2nd toe plantar wound which she is managing herself, admits to picking at the skin.     01/29/25: Returns for followup of right 2nd toe osteomyelitis with distal and plantar 2nd toe wounds. Applied toeball at last visit and increased duloxetine to 120 mg/day for ongoing nerve symptoms. Extended antibiotics an additional 3 weeks at last visit but patient did not pick them up and start them. Had significant improvement in nerve symptoms with duloxetine increase.     02/20/25: Returns for followup of right 2nd toe osteomyelitis, almost done with antibiotics. The toeball I had applied had fallen off and she replaced it with a bandage on her own which she believes was too tight, once removed she noticed the toe was dark and has been scrubbing it hard to try to get blood off. Not painful.      03/06/25: Returns for followup of right 2nd toe. Not currently on antibiotics. Dressing came off and she scrubbed the skin a lot and rewrapped the foot and more skin broke down at the 3rd and 4th toes. Not on antibiotics currently.     03/17/25: Returns for followup of right 2nd toe. On clindamycin and levofloxacin for current infection. Dressing only lasted about a week, no new concerns.     04/02/25:  Returns for followup of right forefoot wounds, continue to improve. No new concerns. Dressings last about a week before sliding down too much     04/15/25: Returns for chronic right 2nd toe wound. All other forefoot wounds now healed. No new concerns.     Primary Care Provider    Primary Care Provider: Vishal Sanchez MD   Last Seen: 2025     History    History obtained from patient and review of medical records.     Past Medical History:   Diagnosis Date    Anxiety disorder 2019    Ascites 2019    Cirrhosis     Depression 2019    Diabetes mellitus     History of cellulitis 2019    Hyponatremia 2019    Portal hypertensive gastropathy 2019    On EGD     Type 2 diabetes mellitus 2019       Past Surgical History:   Procedure Laterality Date     SECTION  ,     COLONOSCOPY      ESOPHAGOGASTRODUODENOSCOPY N/A 2020    Procedure: EGD (ESOPHAGOGASTRODUODENOSCOPY);  Surgeon: Ayden Bernal MD;  Location: [address]);  Service: Endoscopy;  Laterality: N/A;  labs prior    EYE SURGERY      cosmetic    HERNIA REPAIR  2006    umbilical    PERITONEOCENTESIS N/A 10/21/2019    Procedure: PARACENTESIS, ABDOMINAL;  Surgeon: Israel Hidalgo MD;  Location: [facility] CATH LAB;  Service: Radiology;  Laterality: N/A;    TONSILLECTOMY  1972    TUBAL LIGATION      tummy tuck          Objective:     Vitals  Wt Readings from Last 1 Encounters:   25 81.3 kg (179 lb 3.7 oz)     Temp Readings from Last 1 Encounters:   24 96.4 °F (35.8 °C) (Temporal)     BP Readings from Last 1 Encounters:   04/15/25 118/84     Pulse Readings from Last 1 Encounters:   04/15/25 108       Dermatological Exam    Skin:  Pedal hair growth diminished and Pedal skin thin and shiny on right; 2nd toe small healing ulcer to fat, no infection; other wounds heeled; scattered callusing and peeling skin  Pedal hair growth diminished and Pedal skin thin and shiny on left; peeling skin          Nails:  9 nails thickened, right 2nd toenail absent    Vascular Exam    Arteries:  Posterior tibial artery palpable on right  Dorsalis pedis artery palpable on right  Posterior tibial artery palpable on left  Dorsalis pedis artery palpable on left    Veins:  Superficial veins unremarkable on right  Superficial veins unremarkable on left    [place]well[place]+ pitting on right   1+ pitting on left    Neurological Exam    [place] touch test:  0/6 sites sensed, loss of protective sensation     Musculoskeletal Exam    Footwear:  Surgical shoe on right  Casual on left    Gait Exam:   Ambulatory Status: Ambulatory  Gait: Apropulsive  Assistive Devices: None    Foot Progression Angle:  Normal on right  Normal on left     Right Lower Extremity Additional Findings:  2nd toe chronically swollen; no longer tender  Right foot and ankle function, strength, and range of motion unremarkable except as noted above.     Left Lower Extremity Additional Findings:  Left foot and ankle function, strength, and range of motion unremarkable except as noted above.    Imaging and Other Tests    Imagin24 right toe X rays: 2nd distal phalanx completely eroded with middle phalanx partially eroded; consistent  with deep infection    Independently reviewed and interpreted imaging, findings are as follows: N/A    Other Tests: The following A1c results were reviewed.   Hemoglobin A1C   Date Value Ref Range Status   2024 8.2 (H) 4.0 - 5.6 % Final     Comment:     ADA Screening Guidelines:  5.7-6.4%  Consistent with prediabetes  >or=6.5%  Consistent with diabetes    High levels of fetal hemoglobin interfere with the HbA1C  assay. Heterozygous hemoglobin variants (HbS, HgC, etc)do  not significantly interfere with this assay.   However, presence of multiple variants may affect accuracy.     09/15/2023 8.8 (H) 4.0 - 5.6 % Final     Comment:     ADA Screening Guidelines:  5.7-6.4%  Consistent with prediabetes  >or=6.5%  Consistent with  diabetes    High levels of fetal hemoglobin interfere with the HbA1C  assay. Heterozygous hemoglobin variants (HbS, HgC, etc)do  not significantly interfere with this assay.   However, presence of multiple variants may affect accuracy.     06/15/2023 10.7 (H) 4.0 - 5.6 % Final     Comment:     ADA Screening Guidelines:  5.7-6.4%  Consistent with prediabetes  >or=6.5%  Consistent with diabetes    High levels of fetal hemoglobin interfere with the HbA1C  assay. Heterozygous hemoglobin variants (HbS, HgC, etc)do  not significantly interfere with this assay.   However, presence of multiple variants may affect accuracy.           Assessment:     Encounter Diagnosis   Name Primary?    Ulcer of right foot with fat layer exposed Yes           Plan:     I counseled the patient on her conditions, their implications and medical management.    Diabetic foot with peripheral neuropathy: chronic stable   -Performed shoe inspection and diabetic foot education. Reviewed importance of blood glucose control, proper nutrition, and foot hygiene to minimize risk of complications of diabetes. Recommended daily foot inspections, daily moisturizer to feet, avoiding sharp instruments to feet, appropriate footwear at all times when ambulating, and following up regularly for routine foot care.   -Diabetic foot risk: 2023 IWGDF high ulcer risk.  -Continue duloxetine 120 mg/day.     Right 2nd toe osteomyelitis: resolved  -Completed 6 weeks PO antibiotics. Monitoring.     Right 2nd toe ulcer: chronic stable  -Football dressing applied. Patient not to remove dressing.   -RLE surgical shoe.       Return to clinic in 1-2 weeks, call sooner PRN.

## 2025-04-19 DIAGNOSIS — R18.8 OTHER ASCITES: ICD-10-CM

## 2025-04-19 DIAGNOSIS — Z76.82 ORGAN TRANSPLANT CANDIDATE: ICD-10-CM

## 2025-04-19 DIAGNOSIS — K70.31 ALCOHOLIC CIRRHOSIS OF LIVER WITH ASCITES: ICD-10-CM

## 2025-04-19 NOTE — TELEPHONE ENCOUNTER
Care Due:                  Date            Visit Type   Department     Provider  --------------------------------------------------------------------------------                                EP -                              PRIMARY      [facility]  Last Visit: 02-      CARE ([facility])   LAUREN Sanchez                              EP -                              PRIMARY      [facility]  Next Visit: 05-      CARE ([facility])   LAUREN Sanchez                                                            Last  Test          Frequency    Reason                     Performed    Due Date  --------------------------------------------------------------------------------    HBA1C.......  6 months...  TOUJEO, tirzepatide......  11- 05-    [facility] Embedded Care Due Messages. Reference number: [id number].   4/19/2025 2:12:44 PM CDT

## 2025-04-21 RX ORDER — SPIRONOLACTONE 100 MG/1
200 TABLET, FILM COATED ORAL DAILY
Qty: 180 TABLET | Refills: 1 | Status: SHIPPED | OUTPATIENT
Start: 2025-04-21

## 2025-04-21 NOTE — TELEPHONE ENCOUNTER
Refill Routing Note   Medication(s) are not appropriate for processing by [facility] for the following reason(s):        Drug-disease interaction    [facility] action(s):  Defer     Requires labs : Yes      Medication Therapy Plan: Drug-Disease: spironolactone and Hyponatremia; DEFER    Pharmacist review requested: Yes     Appointments  past 12m or future 3m with PCP    Date Provider   Last Visit   2/20/2025 Vishal Sanchez MD   Next Visit   5/20/2025 Vishal Sanchez MD   ED visits in past 90 days: 0        Note composed:9:47 AM 04/21/2025

## 2025-04-25 ENCOUNTER — TELEPHONE (OUTPATIENT)
Dept: PODIATRY | Facility: CLINIC | Age: 60
End: 2025-04-25
Payer: COMMERCIAL

## 2025-04-25 NOTE — TELEPHONE ENCOUNTER
Call patient in regards to missed appointment with Dr. Davalos on 04/25/2025. Patient did not answer so I left voicemail for patient to call us at [phone] to help with getting rescheduled.

## 2025-05-13 NOTE — PROGRESS NOTES
[facility] Progress Note  5/20/2025          Reason for Visit:      had concerns including Follow-up (3m) and Medication Problem (mounjaro).       History of Present Illness:     Patient presents today for follow-up      Type II Diabetes  Toujeo 20 in AM and 40 in PM  (pt taking all 60 at night instead)  Mounjaro 7.5 weekly  Her A1C has improved from 11.7 to 8.2. She administers 60 units of insulin nightly. She experienced significant diarrhea after Mounjaro dose increase to 7.5mg, which coincided with antibiotic use.  She has not taken the mounjaro for several weeks because of this.  She sees Eye doctor on [facility]-last exam 2/2024 - due for repeat- encouraged to follow up  - with neuropathy -   Gabapentin 300 (1 in am, 1 in afternoon, 3 at night).  Cymbalta 60 mg bid.  She reports improvement in neuropathy symptoms with recent medication adjustments.   She developed a sore on the bottom of her foot after attempting to remove a callus, expressing concern due to her diabetic neuropathy. She has a history of infection in the second toe of right foot, previously treated with two oral antibiotics, resulting in partial loss of toe tissue.        Alcoholic cirrhosis  She has a history of alcoholic cirrhosis and is currently taking spironolactone. She previously took multiple diuretics but discontinued two due to a significant drop in blood pressure.    - with portal Hypertensive gastropathyHas portal hypertensive gastropathy, but did not have varicies on most recent EGD per her note.   - with Thrombocytopenia- Pt has thrombocytopenia that is likely also related.  Pt says she is still occasionally drinking alcohol.   - with Ascites- spironolactone 200 daily   She does have history of ascites, and takes spironolactone.  Has had paracentesis in the past several times, but not in a long while.        MDD  Cymbalta 60 - recnetly increased by podiatry to bid  She reports improved  "mood with Cymbalta 60mg, indicating the medication has been effective in managing her symptoms.        PREVENTIVE CARE:  She declines colon cancer screening, mammogram, and vaccines including flu and pneumonia.           Problem List:     Problem List[1]      Medications:     Current Medications[2]      Review of Systems:     Review of Systems   Constitutional:  Negative for chills and fever.   HENT:  Negative for ear pain and sore throat.    Respiratory:  Negative for cough, shortness of breath and wheezing.    Cardiovascular:  Negative for chest pain and palpitations.   Gastrointestinal:  Negative for constipation, diarrhea, nausea and vomiting.   Genitourinary:  Negative for dysuria and hematuria.   Musculoskeletal:  Negative for joint swelling and joint deformity.   Neurological:  Negative for dizziness and weakness.         Physical Exam:     Temp:             Blood Pressure:  110/70        Pulse:   105     Respirations:     Weight:   84.1 kg (185 lb 6.5 oz)  Height:   5' 4" (1.626 m)  BMI:     Body mass index is 31.83 kg/m².    Physical Exam  Constitutional:       General: She is not in acute distress.  HENT:      Right Ear: Tympanic membrane normal.      Left Ear: Tympanic membrane normal.      Nose: No congestion or rhinorrhea.      Mouth/Throat:      Pharynx: No oropharyngeal exudate or posterior oropharyngeal erythema.   Cardiovascular:      Rate and Rhythm: Normal rate and regular rhythm.   Pulmonary:      Effort: Pulmonary effort is normal. No respiratory distress.      Breath sounds: No wheezing.   Abdominal:      Palpations: Abdomen is soft.      Tenderness: There is no abdominal tenderness.   Skin:     General: Skin is warm.   Neurological:      General: No focal deficit present.      Mental Status: She is alert and oriented to person, place, and time.       Labs/Imaging/Testing:     Most recent CBC:  Lab Results   Component Value Date    WBC 10.11 11/04/2024    HGB 16.0 11/04/2024     " 11/04/2024     (H) 11/04/2024       Most recent Lipids:  Lab Results   Component Value Date    CHOL 194 11/04/2024    CHOL 194 11/04/2024    HDL 56 11/04/2024    HDL 56 11/04/2024    LDLCALC 113.6 11/04/2024    LDLCALC 113.6 11/04/2024    TRIG 122 11/04/2024    TRIG 122 11/04/2024       Most recent Chemistry:  Lab Results   Component Value Date     (L) 11/04/2024    K 4.6 11/04/2024    CL 93 (L) 11/04/2024    CO2 29 11/04/2024    BUN 6 11/04/2024    CREATININE 0.8 11/04/2024     (H) 11/04/2024    CALCIUM 9.5 11/04/2024    ALT 35 11/04/2024    AST 38 11/04/2024    ALKPHOS 233 (H) 11/04/2024    PROT 7.6 11/04/2024    ALBUMIN 3.4 (L) 11/04/2024       Other tests:  Lab Results   Component Value Date    TSH 4.702 (H) 11/04/2024    FREET4 0.81 11/04/2024    INR 1.1 04/29/2022    HGBA1C 8.2 (H) 11/04/2024    IRON 46 11/26/2019    FERRITIN 310 (H) 11/26/2019    BNGAREXY18XC 54 11/26/2019    MG 1.8 01/13/2022       Assessment and Plan:     Visit Summary:  Assessed history of toe infection and osteomyelitis, noting completion of antibiotic course.  Evaluated current foot condition, observing swelling in both ankles and an open sore.  Initiated prophylactic antibiotics and started doxycycline for foot sore due to diabetic status and history of foot complications.  Considered referral to wound care or podiatry if condition worsens.  Decreased Mounjaro from 7.5 mg to 5 mg weekly due to significant diarrhea side effects.  Approved once-daily dosing of 60 units insulin at night.  Noted overdue status for eye exam.    - Order comprehensive lab work including A1C and microalbumin test  - Prescribe doxycycline as prophylactic antibiotic for foot sore  - Decrease Mounjaro from 7.5 mg to 5 mg weekly  - Continue 60 units of Toujeo insulin at night  - Recommend eye exam  - Consider referral to wound care or podiatry if foot condition worsens  - Follow up for lab work in the morning, fasting except for water  - Contact  office if foot condition deteriorates       1. Type 2 diabetes mellitus with diabetic polyneuropathy, with long-term current use of insulin  - Monitored the patient's diabetic neuropathy, noting that numbness leads to unnoticed injuries without sensation.  - Educated the patient on the importance of regular foot care and monitoring.  - Evaluated improvement in A1C from 11.7 to 8.2, indicating better glucose control.  - Assessed and approved the current regimen of 60 units of Toujeo insulin at night for continued management, considering the 24-hour half-life and patient convenience.  - Ordered comprehensive lab work including A1C to monitor diabetes management progress and microalbumin test.  - Recommend follow up for an eye exam.    - Monitored the patient's diarrhea while on antibiotics and Mounjaro 7.5 mg.  - Evaluated the correlation between increased Mounjaro dosage and diarrhea, noting resolution after discontinuation.  - Decreased Mounjaro from 7.5 mg to 5 mg weekly to manage this significant side effect.  - Discussed the unpredictability of side effects with GLP-1 agonists, noting that constipation is more common but diarrhea can occur, and explained the long half-life of Mounjaro    - CBC Auto Differential; Future  - Comprehensive Metabolic Panel; Future  - Lipid Panel; Future  - Hemoglobin A1C; Future  - TSH; Future  - tirzepatide (MOUNJARO) 5 mg/0.5 mL PnIj; Inject 5 mg into the skin every 7 days.  Dispense: 4 Pen; Refill: 5  - Microalbumin/Creatinine Ratio, Urine; Future    2. Alcoholic cirrhosis of liver without ascites  Encouraged cessation of alcohol.  Conitnue current meds    3. Diabetic foot infection  - Monitored the patient's foot sore, which is wrapped and has slight exudate, along with asymmetric swelling in the right ankle.  - Evaluated the patient's history of foot ulcers, osteomyelitis in the second toe of the right foot, and partial loss of the second toe due to erosion from infection.  -  Prescribed doxycycline as prophylactic antibiotic for the open skin on the foot to prevent infection, noting past successful treatment with the same medication for osteomyelitis.  - Urged referral to wound care or podiatry if condition worsens- pt declines referrals for now.  - Instructed the patient to contact the office if foot condition deteriorates.      - Explained fasting requirements for upcoming lab work, emphasizing water consumption is allowed.  - Follow up for lab work for a morning when patient has not eaten or drunk anything except water.        Vishal Sanchez MD  5/20/2025    This note was prepared using voice-recognition software.  Although efforts are made to proofread the note, some errors may persist in the final document.         [1]   Patient Active Problem List  Diagnosis    Alcoholic cirrhosis of liver    Ascites    Type 2 diabetes mellitus with diabetic polyneuropathy, with long-term current use of insulin    History of cellulitis    Hyponatremia    Portal hypertensive gastropathy    Anxiety disorder    Depression    Esophageal varices    Portal hypertension    Liver mass    Hypokalemia    Thrombocytopenia, unspecified    Recurrent major depressive disorder, in partial remission    Alcohol use, unspecified with other alcohol-induced disorder   [2]   Current Outpatient Medications:     cyanocobalamin (VITAMIN B-12) 100 MCG tablet, Take 100 mcg by mouth once daily., Disp: , Rfl:     DULoxetine (CYMBALTA) 60 MG capsule, Take 1 capsule (60 mg total) by mouth 2 (two) times daily., Disp: 60 capsule, Rfl: 11    folic acid (FOLVITE) 1 MG tablet, Take 1 tablet (1,000 mcg total) by mouth once daily., Disp: 30 tablet, Rfl: 8    gabapentin (NEURONTIN) 300 MG capsule, Take 1 capsule at breakfast, 1 capsule at lunch, and 3 capsules at bedtime., Disp: 150 capsule, Rfl: 3    multivit with minerals/lutein (MULTIVITAMIN 50 PLUS ORAL), Take by mouth once daily., Disp: , Rfl:     pen needle, diabetic 30 gauge x  "1/3" Ndle, NovoFine 30 30 gauge x 1/3" needle, Disp: 50 each, Rfl: 3    pen needle, diabetic, safety (NOVOFINE AUTOCOVER) 30 gauge x 1/3" Ndle, 1 each by Misc.(Non-Drug; Combo Route) route once daily., Disp: 50 each, Rfl: 3    spironolactone (ALDACTONE) 100 MG tablet, TAKE 2 TABLETS (200 MG TOTAL) BY MOUTH ONCE DAILY., Disp: 180 tablet, Rfl: 1    thiamine 100 MG tablet, Take 100 mg by mouth once daily., Disp: , Rfl:     TOUJEO SOLOSTAR U-300 INSULIN 300 unit/mL (1.5 mL) InPn pen, INJECT 40 UNITS UNDER THE SKIN EVERY MORNING AND 20 UNITS UNDER THE SKIN EVERY EVENING, Disp: 18 mL, Rfl: 1    doxycycline (VIBRA-TABS) 100 MG tablet, Take 1 tablet (100 mg total) by mouth 2 (two) times daily., Disp: 20 tablet, Rfl: 0    tirzepatide (MOUNJARO) 5 mg/0.5 mL PnIj, Inject 5 mg into the skin every 7 days., Disp: 4 Pen, Rfl: 5    "

## 2025-05-20 ENCOUNTER — OFFICE VISIT (OUTPATIENT)
Dept: PRIMARY CARE CLINIC | Facility: CLINIC | Age: 60
End: 2025-05-20
Payer: COMMERCIAL

## 2025-05-20 VITALS
SYSTOLIC BLOOD PRESSURE: 110 MMHG | OXYGEN SATURATION: 96 % | DIASTOLIC BLOOD PRESSURE: 70 MMHG | BODY MASS INDEX: 31.66 KG/M2 | HEIGHT: 64 IN | HEART RATE: 105 BPM | WEIGHT: 185.44 LBS

## 2025-05-20 DIAGNOSIS — K70.30 ALCOHOLIC CIRRHOSIS OF LIVER WITHOUT ASCITES: Primary | ICD-10-CM

## 2025-05-20 DIAGNOSIS — L08.9 DIABETIC FOOT INFECTION: ICD-10-CM

## 2025-05-20 DIAGNOSIS — E11.628 DIABETIC FOOT INFECTION: ICD-10-CM

## 2025-05-20 DIAGNOSIS — E11.42 TYPE 2 DIABETES MELLITUS WITH DIABETIC POLYNEUROPATHY, WITH LONG-TERM CURRENT USE OF INSULIN: ICD-10-CM

## 2025-05-20 DIAGNOSIS — Z79.4 TYPE 2 DIABETES MELLITUS WITH DIABETIC POLYNEUROPATHY, WITH LONG-TERM CURRENT USE OF INSULIN: ICD-10-CM

## 2025-05-20 PROCEDURE — 1159F MED LIST DOCD IN RCRD: CPT | Mod: CPTII,S$GLB,, | Performed by: INTERNAL MEDICINE

## 2025-05-20 PROCEDURE — 3078F DIAST BP <80 MM HG: CPT | Mod: CPTII,S$GLB,, | Performed by: INTERNAL MEDICINE

## 2025-05-20 PROCEDURE — 99214 OFFICE O/P EST MOD 30 MIN: CPT | Mod: S$GLB,,, | Performed by: INTERNAL MEDICINE

## 2025-05-20 PROCEDURE — 3008F BODY MASS INDEX DOCD: CPT | Mod: CPTII,S$GLB,, | Performed by: INTERNAL MEDICINE

## 2025-05-20 PROCEDURE — 3074F SYST BP LT 130 MM HG: CPT | Mod: CPTII,S$GLB,, | Performed by: INTERNAL MEDICINE

## 2025-05-20 PROCEDURE — 99999 PR PBB SHADOW E&M-EST. PATIENT-LVL IV: CPT | Mod: PBBFAC,,, | Performed by: INTERNAL MEDICINE

## 2025-05-20 RX ORDER — TIRZEPATIDE 5 MG/.5ML
5 INJECTION, SOLUTION SUBCUTANEOUS
Qty: 4 PEN | Refills: 5 | Status: SHIPPED | OUTPATIENT
Start: 2025-05-20

## 2025-05-20 RX ORDER — DOXYCYCLINE HYCLATE 100 MG
100 TABLET ORAL 2 TIMES DAILY
Qty: 20 TABLET | Refills: 0 | Status: SHIPPED | OUTPATIENT
Start: 2025-05-20

## 2025-05-20 RX ORDER — DOXYCYCLINE HYCLATE 100 MG
100 TABLET ORAL 2 TIMES DAILY
Qty: 20 TABLET | Refills: 0 | Status: SHIPPED | OUTPATIENT
Start: 2025-05-20 | End: 2025-05-20 | Stop reason: SDUPTHER

## 2025-05-23 ENCOUNTER — LAB VISIT (OUTPATIENT)
Dept: LAB | Facility: HOSPITAL | Age: 60
End: 2025-05-23
Attending: INTERNAL MEDICINE
Payer: COMMERCIAL

## 2025-05-23 DIAGNOSIS — Z79.4 TYPE 2 DIABETES MELLITUS WITH DIABETIC POLYNEUROPATHY, WITH LONG-TERM CURRENT USE OF INSULIN: ICD-10-CM

## 2025-05-23 DIAGNOSIS — E11.42 TYPE 2 DIABETES MELLITUS WITH DIABETIC POLYNEUROPATHY, WITH LONG-TERM CURRENT USE OF INSULIN: ICD-10-CM

## 2025-05-23 LAB
ABSOLUTE EOSINOPHIL (OHS): 0.59 K/UL
ABSOLUTE MONOCYTE (OHS): 0.66 K/UL (ref 0.3–1)
ABSOLUTE NEUTROPHIL COUNT (OHS): 4.81 K/UL (ref 1.8–7.7)
ALBUMIN SERPL BCP-MCNC: 3.1 G/DL (ref 3.5–5.2)
ALP SERPL-CCNC: 232 UNIT/L (ref 40–150)
ALT SERPL W/O P-5'-P-CCNC: 20 UNIT/L (ref 10–44)
ANION GAP (OHS): 11 MMOL/L (ref 8–16)
AST SERPL-CCNC: 35 UNIT/L (ref 11–45)
BASOPHILS # BLD AUTO: 0.05 K/UL
BASOPHILS NFR BLD AUTO: 0.6 %
BILIRUB SERPL-MCNC: 0.8 MG/DL (ref 0.1–1)
BUN SERPL-MCNC: 7 MG/DL (ref 6–20)
CALCIUM SERPL-MCNC: 9.2 MG/DL (ref 8.7–10.5)
CHLORIDE SERPL-SCNC: 102 MMOL/L (ref 95–110)
CHOLEST SERPL-MCNC: 194 MG/DL (ref 120–199)
CHOLEST/HDLC SERPL: 3.7 {RATIO} (ref 2–5)
CO2 SERPL-SCNC: 23 MMOL/L (ref 23–29)
CREAT SERPL-MCNC: 0.7 MG/DL (ref 0.5–1.4)
ERYTHROCYTE [DISTWIDTH] IN BLOOD BY AUTOMATED COUNT: 13.5 % (ref 11.5–14.5)
GFR SERPLBLD CREATININE-BSD FMLA CKD-EPI: >60 ML/MIN/1.73/M2
GLUCOSE SERPL-MCNC: 143 MG/DL (ref 70–110)
HCT VFR BLD AUTO: 43.5 % (ref 37–48.5)
HDLC SERPL-MCNC: 52 MG/DL (ref 40–75)
HDLC SERPL: 26.8 % (ref 20–50)
HGB BLD-MCNC: 13.7 GM/DL (ref 12–16)
IMM GRANULOCYTES # BLD AUTO: 0.04 K/UL (ref 0–0.04)
IMM GRANULOCYTES NFR BLD AUTO: 0.5 % (ref 0–0.5)
LDLC SERPL CALC-MCNC: 120.6 MG/DL (ref 63–159)
LYMPHOCYTES # BLD AUTO: 1.63 K/UL (ref 1–4.8)
MCH RBC QN AUTO: 33.4 PG (ref 27–31)
MCHC RBC AUTO-ENTMCNC: 31.5 G/DL (ref 32–36)
MCV RBC AUTO: 106 FL (ref 82–98)
NONHDLC SERPL-MCNC: 142 MG/DL
NUCLEATED RBC (/100WBC) (OHS): 0 /100 WBC
PLATELET # BLD AUTO: 177 K/UL (ref 150–450)
PMV BLD AUTO: 9.2 FL (ref 9.2–12.9)
POTASSIUM SERPL-SCNC: 5.2 MMOL/L (ref 3.5–5.1)
PROT SERPL-MCNC: 7.3 GM/DL (ref 6–8.4)
RBC # BLD AUTO: 4.1 M/UL (ref 4–5.4)
RELATIVE EOSINOPHIL (OHS): 7.6 %
RELATIVE LYMPHOCYTE (OHS): 21 % (ref 18–48)
RELATIVE MONOCYTE (OHS): 8.5 % (ref 4–15)
RELATIVE NEUTROPHIL (OHS): 61.8 % (ref 38–73)
SODIUM SERPL-SCNC: 136 MMOL/L (ref 136–145)
TRIGL SERPL-MCNC: 107 MG/DL (ref 30–150)
TSH SERPL-ACNC: 4.33 UIU/ML (ref 0.4–4)
WBC # BLD AUTO: 7.78 K/UL (ref 3.9–12.7)

## 2025-05-23 PROCEDURE — 84443 ASSAY THYROID STIM HORMONE: CPT

## 2025-05-23 PROCEDURE — 80053 COMPREHEN METABOLIC PANEL: CPT

## 2025-05-23 PROCEDURE — 85025 COMPLETE CBC W/AUTO DIFF WBC: CPT

## 2025-05-23 PROCEDURE — 36415 COLL VENOUS BLD VENIPUNCTURE: CPT | Mod: PN

## 2025-05-23 PROCEDURE — 83036 HEMOGLOBIN GLYCOSYLATED A1C: CPT

## 2025-05-23 PROCEDURE — 84439 ASSAY OF FREE THYROXINE: CPT

## 2025-05-23 PROCEDURE — 80061 LIPID PANEL: CPT

## 2025-05-24 LAB
EAG (OHS): 131 MG/DL (ref 68–131)
HBA1C MFR BLD: 6.2 % (ref 4–5.6)
T4 FREE SERPL-MCNC: 0.81 NG/DL (ref 0.71–1.51)

## 2025-05-26 ENCOUNTER — RESULTS FOLLOW-UP (OUTPATIENT)
Dept: PRIMARY CARE CLINIC | Facility: CLINIC | Age: 60
End: 2025-05-26

## 2025-05-26 ENCOUNTER — TELEPHONE (OUTPATIENT)
Dept: PRIMARY CARE CLINIC | Facility: CLINIC | Age: 60
End: 2025-05-26
Payer: COMMERCIAL

## 2025-05-26 NOTE — TELEPHONE ENCOUNTER
----- Message from Vishal Sanchez MD sent at 5/26/2025  5:17 AM CDT -----  Please advise (no [facility]gu[facility] account):  The A1c is down to 6.2, much improved and now at goal.  The potassium was slightly high and I think we can watch this for now.    ----- Message -----  From: Lab, Background User  Sent: 5/23/2025   8:03 PM CDT  To: Vishal Sanchez MD

## 2025-06-02 ENCOUNTER — TELEPHONE (OUTPATIENT)
Dept: PRIMARY CARE CLINIC | Facility: CLINIC | Age: 60
End: 2025-06-02
Payer: COMMERCIAL

## 2025-07-02 DIAGNOSIS — G62.9 NEUROPATHY: ICD-10-CM

## 2025-07-02 RX ORDER — GABAPENTIN 300 MG/1
CAPSULE ORAL
Qty: 150 CAPSULE | Refills: 3 | Status: SHIPPED | OUTPATIENT
Start: 2025-07-02

## 2025-07-11 DIAGNOSIS — Z79.4 TYPE 2 DIABETES MELLITUS WITH DIABETIC POLYNEUROPATHY, WITH LONG-TERM CURRENT USE OF INSULIN: ICD-10-CM

## 2025-07-11 DIAGNOSIS — E11.42 TYPE 2 DIABETES MELLITUS WITH DIABETIC POLYNEUROPATHY, WITH LONG-TERM CURRENT USE OF INSULIN: ICD-10-CM

## 2025-07-11 RX ORDER — INSULIN GLARGINE 300 [IU]/ML
40 INJECTION, SOLUTION SUBCUTANEOUS DAILY
Qty: 18 ML | Refills: 1 | Status: SHIPPED | OUTPATIENT
Start: 2025-07-11

## 2025-07-11 NOTE — TELEPHONE ENCOUNTER
Copied from CRM #[id number]. Topic: Medications - Medication Refill  >> Jul 11, 2025  3:55 PM Clark wrote:  Requesting an RX refill or new RX.    Is this a refill or new RX: refill    RX name and strength (copy/paste from chart):  TOUJEO SOLOSTAR U-300 INSULIN 300 unit/mL (1.5 mL) InPn pen     Is this a 30 day or 90 day RX: 18ml    Pharmacy name and phone # (copy/paste from chart):    [id number] - [address] [facility] [address] [place] [address]  Phone: [phone] Fax: [phone]       Who called and call back number:Brunilda Delgadillo [phone]      The doctors have asked that we provide their patients with the following 2 reminders -- prescription refills can take up to 72 hours, and a friendly reminder that in the future you can use your [facility] account to request refills:

## 2025-07-11 NOTE — TELEPHONE ENCOUNTER
No care due was identified.  [facility] Embedded Care Due Messages. Reference number: [id number].   7/11/2025 4:23:12 PM CDT

## 2025-07-16 DIAGNOSIS — Z79.4 TYPE 2 DIABETES MELLITUS WITH DIABETIC POLYNEUROPATHY, WITH LONG-TERM CURRENT USE OF INSULIN: ICD-10-CM

## 2025-07-16 DIAGNOSIS — E11.42 TYPE 2 DIABETES MELLITUS WITH DIABETIC POLYNEUROPATHY, WITH LONG-TERM CURRENT USE OF INSULIN: ICD-10-CM

## 2025-07-16 RX ORDER — INSULIN GLARGINE 300 [IU]/ML
40 INJECTION, SOLUTION SUBCUTANEOUS DAILY
Qty: 18 ML | Refills: 1 | Status: SHIPPED | OUTPATIENT
Start: 2025-07-16

## 2025-07-16 NOTE — TELEPHONE ENCOUNTER
No care due was identified.  [facility] Embedded Care Due Messages. Reference number: [id number].   7/16/2025 3:45:51 PM CDT

## 2025-07-17 ENCOUNTER — TELEPHONE (OUTPATIENT)
Dept: PRIMARY CARE CLINIC | Facility: CLINIC | Age: 60
End: 2025-07-17
Payer: COMMERCIAL

## 2025-07-17 DIAGNOSIS — E11.42 TYPE 2 DIABETES MELLITUS WITH DIABETIC POLYNEUROPATHY, WITH LONG-TERM CURRENT USE OF INSULIN: Primary | ICD-10-CM

## 2025-07-17 DIAGNOSIS — Z79.4 TYPE 2 DIABETES MELLITUS WITH DIABETIC POLYNEUROPATHY, WITH LONG-TERM CURRENT USE OF INSULIN: Primary | ICD-10-CM

## 2025-07-17 RX ORDER — INSULIN DEGLUDEC 100 U/ML
40 INJECTION, SOLUTION SUBCUTANEOUS DAILY
Qty: 12 ML | Refills: 11 | Status: SHIPPED | OUTPATIENT
Start: 2025-07-17 | End: 2026-07-17

## 2025-07-17 NOTE — TELEPHONE ENCOUNTER
Can you please let her know that I sent Tresiba to replace the Toujeo.  If this is also not covered, we could try basaglar, Lantus or Semglee as other options  I'm sure the insurance must cover one of them at least

## 2025-07-17 NOTE — TELEPHONE ENCOUNTER
insulin glargine, TOUJEO, (TOUJEO SOLOSTAR U-300 INSULIN) 300 unit/mL (1.5 mL) InPn pen       PA denied    The requested medication and/or diagnosis are not a covered benefit and excluded from coverage in  accordance with the terms and conditions of your plan benefit. Therefore, the request has been  administratively denied.

## 2025-07-18 ENCOUNTER — TELEPHONE (OUTPATIENT)
Dept: PRIMARY CARE CLINIC | Facility: CLINIC | Age: 60
End: 2025-07-18
Payer: COMMERCIAL

## 2025-07-18 NOTE — TELEPHONE ENCOUNTER
insulin degludec (TRESIBA FLEXTOUCH U-100) 100 unit/mL (3 mL) insulin pen     DENIED    The requested medication and/or diagnosis are not a covered benefit and excluded from coverage in accordance with the terms and conditions of your plan benefit. Therefore, the request has been administratively denied.

## 2025-07-20 RX ORDER — INSULIN GLARGINE 100 [IU]/ML
40 INJECTION, SOLUTION SUBCUTANEOUS DAILY
Qty: 12 ML | Refills: 5 | Status: SHIPPED | OUTPATIENT
Start: 2025-07-20 | End: 2026-07-20

## 2025-07-20 NOTE — TELEPHONE ENCOUNTER
I sent rx for lantus to try, also with instructions that the pharmacy could substitute any other basal insulin of insurance choice (Semglee, Basaglar)

## 2025-08-10 DIAGNOSIS — E11.42 TYPE 2 DIABETES MELLITUS WITH DIABETIC POLYNEUROPATHY, WITH LONG-TERM CURRENT USE OF INSULIN: ICD-10-CM

## 2025-08-10 DIAGNOSIS — Z79.4 TYPE 2 DIABETES MELLITUS WITH DIABETIC POLYNEUROPATHY, WITH LONG-TERM CURRENT USE OF INSULIN: ICD-10-CM

## 2025-08-10 RX ORDER — INSULIN GLARGINE 300 U/ML
INJECTION, SOLUTION SUBCUTANEOUS
Qty: 18 ML | Refills: 1 | OUTPATIENT
Start: 2025-08-10

## (undated) DEVICE — KIT PROBE COVER WITH GEL

## (undated) DEVICE — GLOVE BIOGEL SKINSENSE PI 7.5

## (undated) DEVICE — CATH DRN CENTESIS 5FR 10CM L

## (undated) DEVICE — TRAY PARACENTESIS 8FR

## (undated) DEVICE — BOTTLE  EVOLUTION EVAC SUC